# Patient Record
Sex: FEMALE | Race: WHITE | NOT HISPANIC OR LATINO | Employment: OTHER | URBAN - NONMETROPOLITAN AREA
[De-identification: names, ages, dates, MRNs, and addresses within clinical notes are randomized per-mention and may not be internally consistent; named-entity substitution may affect disease eponyms.]

---

## 2017-01-01 ENCOUNTER — LAB (OUTPATIENT)
Dept: LAB | Facility: HOSPITAL | Age: 82
End: 2017-01-01

## 2017-01-01 ENCOUNTER — OFFICE VISIT (OUTPATIENT)
Dept: FAMILY MEDICINE CLINIC | Facility: CLINIC | Age: 82
End: 2017-01-01

## 2017-01-01 ENCOUNTER — TELEPHONE (OUTPATIENT)
Dept: FAMILY MEDICINE CLINIC | Facility: CLINIC | Age: 82
End: 2017-01-01

## 2017-01-01 VITALS
WEIGHT: 106 LBS | DIASTOLIC BLOOD PRESSURE: 65 MMHG | HEIGHT: 60 IN | OXYGEN SATURATION: 97 % | SYSTOLIC BLOOD PRESSURE: 128 MMHG | BODY MASS INDEX: 20.81 KG/M2 | HEART RATE: 70 BPM

## 2017-01-01 VITALS
HEART RATE: 65 BPM | BODY MASS INDEX: 24.23 KG/M2 | WEIGHT: 123.4 LBS | OXYGEN SATURATION: 98 % | SYSTOLIC BLOOD PRESSURE: 125 MMHG | DIASTOLIC BLOOD PRESSURE: 60 MMHG | HEIGHT: 60 IN

## 2017-01-01 DIAGNOSIS — E03.9 ACQUIRED HYPOTHYROIDISM: Primary | ICD-10-CM

## 2017-01-01 DIAGNOSIS — Z23 NEED FOR VACCINATION: Primary | ICD-10-CM

## 2017-01-01 DIAGNOSIS — I10 ESSENTIAL HYPERTENSION: Primary | ICD-10-CM

## 2017-01-01 DIAGNOSIS — E03.9 ACQUIRED HYPOTHYROIDISM: ICD-10-CM

## 2017-01-01 DIAGNOSIS — I63.30 CEREBROVASCULAR ACCIDENT (CVA) DUE TO THROMBOSIS OF CEREBRAL ARTERY (HCC): Chronic | ICD-10-CM

## 2017-01-01 DIAGNOSIS — I10 ESSENTIAL HYPERTENSION: ICD-10-CM

## 2017-01-01 DIAGNOSIS — I63.30 CEREBROVASCULAR ACCIDENT (CVA) DUE TO THROMBOSIS OF CEREBRAL ARTERY (HCC): ICD-10-CM

## 2017-01-01 DIAGNOSIS — I25.10 CORONARY ARTERIOSCLEROSIS: Primary | ICD-10-CM

## 2017-01-01 DIAGNOSIS — I25.10 CORONARY ARTERIOSCLEROSIS: ICD-10-CM

## 2017-01-01 DIAGNOSIS — G47.09 OTHER INSOMNIA: ICD-10-CM

## 2017-01-01 DIAGNOSIS — N39.0 URINARY TRACT INFECTION, SITE UNSPECIFIED: Primary | ICD-10-CM

## 2017-01-01 DIAGNOSIS — E78.2 MIXED HYPERLIPIDEMIA: ICD-10-CM

## 2017-01-01 DIAGNOSIS — D64.9 ANEMIA, UNSPECIFIED TYPE: ICD-10-CM

## 2017-01-01 DIAGNOSIS — I10 ESSENTIAL HYPERTENSION: Chronic | ICD-10-CM

## 2017-01-01 DIAGNOSIS — E03.9 ACQUIRED HYPOTHYROIDISM: Chronic | ICD-10-CM

## 2017-01-01 DIAGNOSIS — F41.1 ANXIETY STATE: ICD-10-CM

## 2017-01-01 LAB
ALBUMIN SERPL-MCNC: 4.2 G/DL (ref 3.4–4.8)
ALBUMIN/GLOB SERPL: 1.9 G/DL (ref 1.1–1.8)
ALP SERPL-CCNC: 93 U/L (ref 38–126)
ALT SERPL W P-5'-P-CCNC: 23 U/L (ref 9–52)
ANION GAP SERPL CALCULATED.3IONS-SCNC: 12 MMOL/L (ref 5–15)
AST SERPL-CCNC: 22 U/L (ref 14–36)
BASOPHILS # BLD AUTO: 0.03 10*3/MM3 (ref 0–0.2)
BASOPHILS # BLD AUTO: 0.07 10*3/MM3 (ref 0–0.2)
BASOPHILS NFR BLD AUTO: 0.4 % (ref 0–2)
BASOPHILS NFR BLD AUTO: 1 % (ref 0–2)
BILIRUB SERPL-MCNC: 0.2 MG/DL (ref 0.2–1.3)
BUN BLD-MCNC: 18 MG/DL (ref 7–21)
BUN/CREAT SERPL: 16.8 (ref 7–25)
CALCIUM SPEC-SCNC: 9.5 MG/DL (ref 8.4–10.2)
CHLORIDE SERPL-SCNC: 101 MMOL/L (ref 95–110)
CO2 SERPL-SCNC: 23 MMOL/L (ref 22–31)
CREAT BLD-MCNC: 1.07 MG/DL (ref 0.5–1)
DEPRECATED RDW RBC AUTO: 45.2 FL (ref 36.4–46.3)
DEPRECATED RDW RBC AUTO: 52 FL (ref 36.4–46.3)
EOSINOPHIL # BLD AUTO: 0.15 10*3/MM3 (ref 0–0.7)
EOSINOPHIL # BLD AUTO: 0.23 10*3/MM3 (ref 0–0.7)
EOSINOPHIL NFR BLD AUTO: 1.9 % (ref 0–7)
EOSINOPHIL NFR BLD AUTO: 3.4 % (ref 0–7)
ERYTHROCYTE [DISTWIDTH] IN BLOOD BY AUTOMATED COUNT: 13.1 % (ref 11.5–14.5)
ERYTHROCYTE [DISTWIDTH] IN BLOOD BY AUTOMATED COUNT: 14.9 % (ref 11.5–14.5)
GFR SERPL CREATININE-BSD FRML MDRD: 48 ML/MIN/1.73 (ref 39–90)
GLOBULIN UR ELPH-MCNC: 2.2 GM/DL (ref 2.3–3.5)
GLUCOSE BLD-MCNC: 90 MG/DL (ref 60–100)
HCT VFR BLD AUTO: 33.7 % (ref 35–45)
HCT VFR BLD AUTO: 36.6 % (ref 35–45)
HGB BLD-MCNC: 11.4 G/DL (ref 12–15.5)
HGB BLD-MCNC: 11.8 G/DL (ref 12–15.5)
IMM GRANULOCYTES # BLD: 0.01 10*3/MM3 (ref 0–0.02)
IMM GRANULOCYTES # BLD: 0.01 10*3/MM3 (ref 0–0.02)
IMM GRANULOCYTES NFR BLD: 0.1 % (ref 0–0.5)
IMM GRANULOCYTES NFR BLD: 0.1 % (ref 0–0.5)
LYMPHOCYTES # BLD AUTO: 2.75 10*3/MM3 (ref 0.6–4.2)
LYMPHOCYTES # BLD AUTO: 3.08 10*3/MM3 (ref 0.6–4.2)
LYMPHOCYTES NFR BLD AUTO: 39.4 % (ref 10–50)
LYMPHOCYTES NFR BLD AUTO: 40.5 % (ref 10–50)
MCH RBC QN AUTO: 30.7 PG (ref 26.5–34)
MCH RBC QN AUTO: 31.7 PG (ref 26.5–34)
MCHC RBC AUTO-ENTMCNC: 32.2 G/DL (ref 31.4–36)
MCHC RBC AUTO-ENTMCNC: 33.8 G/DL (ref 31.4–36)
MCV RBC AUTO: 93.6 FL (ref 80–98)
MCV RBC AUTO: 95.3 FL (ref 80–98)
MONOCYTES # BLD AUTO: 0.46 10*3/MM3 (ref 0–0.9)
MONOCYTES # BLD AUTO: 0.47 10*3/MM3 (ref 0–0.9)
MONOCYTES NFR BLD AUTO: 5.9 % (ref 0–12)
MONOCYTES NFR BLD AUTO: 6.9 % (ref 0–12)
NEUTROPHILS # BLD AUTO: 3.26 10*3/MM3 (ref 2–8.6)
NEUTROPHILS # BLD AUTO: 4.09 10*3/MM3 (ref 2–8.6)
NEUTROPHILS NFR BLD AUTO: 48.1 % (ref 37–80)
NEUTROPHILS NFR BLD AUTO: 52.3 % (ref 37–80)
NRBC BLD MANUAL-RTO: 0 /100 WBC (ref 0–0)
PLATELET # BLD AUTO: 276 10*3/MM3 (ref 150–450)
PLATELET # BLD AUTO: 287 10*3/MM3 (ref 150–450)
PMV BLD AUTO: 10.6 FL (ref 8–12)
PMV BLD AUTO: 9.8 FL (ref 8–12)
POTASSIUM BLD-SCNC: 5.1 MMOL/L (ref 3.5–5.1)
PROT SERPL-MCNC: 6.4 G/DL (ref 6.3–8.6)
RBC # BLD AUTO: 3.6 10*6/MM3 (ref 3.77–5.16)
RBC # BLD AUTO: 3.84 10*6/MM3 (ref 3.77–5.16)
SODIUM BLD-SCNC: 136 MMOL/L (ref 137–145)
T4 FREE SERPL-MCNC: 1.26 NG/DL (ref 0.78–2.19)
T4 FREE SERPL-MCNC: 1.54 NG/DL (ref 0.78–2.19)
TSH SERPL DL<=0.05 MIU/L-ACNC: 0.66 MIU/ML (ref 0.46–4.68)
TSH SERPL DL<=0.05 MIU/L-ACNC: 3.08 MIU/ML (ref 0.46–4.68)
WBC NRBC COR # BLD: 6.79 10*3/MM3 (ref 3.2–9.8)
WBC NRBC COR # BLD: 7.82 10*3/MM3 (ref 3.2–9.8)

## 2017-01-01 PROCEDURE — 84439 ASSAY OF FREE THYROXINE: CPT | Performed by: GENERAL PRACTICE

## 2017-01-01 PROCEDURE — G0008 ADMIN INFLUENZA VIRUS VAC: HCPCS | Performed by: GENERAL PRACTICE

## 2017-01-01 PROCEDURE — 99214 OFFICE O/P EST MOD 30 MIN: CPT | Performed by: GENERAL PRACTICE

## 2017-01-01 PROCEDURE — 90662 IIV NO PRSV INCREASED AG IM: CPT | Performed by: GENERAL PRACTICE

## 2017-01-01 PROCEDURE — 84443 ASSAY THYROID STIM HORMONE: CPT | Performed by: GENERAL PRACTICE

## 2017-01-01 PROCEDURE — 85025 COMPLETE CBC W/AUTO DIFF WBC: CPT | Performed by: GENERAL PRACTICE

## 2017-01-01 PROCEDURE — 36415 COLL VENOUS BLD VENIPUNCTURE: CPT

## 2017-01-01 PROCEDURE — 80053 COMPREHEN METABOLIC PANEL: CPT | Performed by: GENERAL PRACTICE

## 2017-01-01 RX ORDER — FLUTICASONE PROPIONATE 50 MCG
SPRAY, SUSPENSION (ML) NASAL
Qty: 16 G | Refills: 3 | Status: SHIPPED | OUTPATIENT
Start: 2017-01-01 | End: 2017-01-01 | Stop reason: SDUPTHER

## 2017-01-01 RX ORDER — ERGOCALCIFEROL (VITAMIN D2) 10 MCG
1000 TABLET ORAL DAILY
COMMUNITY

## 2017-01-01 RX ORDER — LOSARTAN POTASSIUM 100 MG/1
TABLET ORAL
Qty: 30 TABLET | Refills: 6 | Status: SHIPPED | OUTPATIENT
Start: 2017-01-01

## 2017-01-01 RX ORDER — LOSARTAN POTASSIUM 100 MG/1
TABLET ORAL
Qty: 30 TABLET | Refills: 6 | Status: SHIPPED | OUTPATIENT
Start: 2017-01-01 | End: 2017-01-01 | Stop reason: SDUPTHER

## 2017-01-01 RX ORDER — PANTOPRAZOLE SODIUM 40 MG/1
TABLET, DELAYED RELEASE ORAL
Qty: 30 TABLET | Refills: 6 | Status: SHIPPED | OUTPATIENT
Start: 2017-01-01

## 2017-01-01 RX ORDER — LEVOTHYROXINE SODIUM 0.07 MG/1
TABLET ORAL
Qty: 30 TABLET | Refills: 5 | Status: SHIPPED | OUTPATIENT
Start: 2017-01-01

## 2017-01-01 RX ORDER — CLOPIDOGREL BISULFATE 75 MG/1
75 TABLET ORAL DAILY
Qty: 90 TABLET | Refills: 3 | Status: SHIPPED | OUTPATIENT
Start: 2017-01-01

## 2017-01-01 RX ORDER — ALPRAZOLAM 0.25 MG/1
0.25 TABLET ORAL 3 TIMES DAILY PRN
Qty: 90 TABLET | Refills: 3 | Status: SHIPPED | OUTPATIENT
Start: 2017-01-01 | End: 2017-01-01 | Stop reason: SDUPTHER

## 2017-01-01 RX ORDER — ALPRAZOLAM 0.25 MG/1
0.25 TABLET ORAL 3 TIMES DAILY PRN
Qty: 90 TABLET | Refills: 3 | Status: SHIPPED | OUTPATIENT
Start: 2017-01-01

## 2017-01-01 RX ORDER — HYDRALAZINE HYDROCHLORIDE 25 MG/1
TABLET, FILM COATED ORAL
Qty: 90 TABLET | Refills: 3 | Status: SHIPPED | OUTPATIENT
Start: 2017-01-01 | End: 2017-01-01 | Stop reason: SDUPTHER

## 2017-01-01 RX ORDER — LEVOTHYROXINE SODIUM 0.07 MG/1
TABLET ORAL
Qty: 30 TABLET | Refills: 5 | Status: SHIPPED | OUTPATIENT
Start: 2017-01-01 | End: 2017-01-01 | Stop reason: SDUPTHER

## 2017-01-01 RX ORDER — HYDRALAZINE HYDROCHLORIDE 25 MG/1
TABLET, FILM COATED ORAL
Qty: 90 TABLET | Refills: 3 | Status: SHIPPED | OUTPATIENT
Start: 2017-01-01

## 2017-01-01 RX ORDER — SIMETHICONE 80 MG
80 TABLET,CHEWABLE ORAL NIGHTLY
COMMUNITY

## 2017-01-01 RX ORDER — FLUTICASONE PROPIONATE 50 MCG
SPRAY, SUSPENSION (ML) NASAL
Qty: 16 G | Refills: 3 | Status: SHIPPED | OUTPATIENT
Start: 2017-01-01

## 2017-01-13 RX ORDER — HYDRALAZINE HYDROCHLORIDE 25 MG/1
TABLET, FILM COATED ORAL
Qty: 90 TABLET | Refills: 3 | Status: SHIPPED | OUTPATIENT
Start: 2017-01-13 | End: 2017-01-01 | Stop reason: SDUPTHER

## 2017-01-31 ENCOUNTER — LAB (OUTPATIENT)
Dept: LAB | Facility: HOSPITAL | Age: 82
End: 2017-01-31

## 2017-01-31 DIAGNOSIS — N39.0 UTI (URINARY TRACT INFECTION), UNCOMPLICATED: Primary | ICD-10-CM

## 2017-01-31 DIAGNOSIS — N39.0 UTI (URINARY TRACT INFECTION), UNCOMPLICATED: ICD-10-CM

## 2017-01-31 LAB
BACTERIA UR QL AUTO: ABNORMAL /HPF
BILIRUB UR QL STRIP: NEGATIVE
CLARITY UR: ABNORMAL
COLOR UR: YELLOW
GLUCOSE UR STRIP-MCNC: NEGATIVE MG/DL
HGB UR QL STRIP.AUTO: NEGATIVE
HYALINE CASTS UR QL AUTO: ABNORMAL /LPF
KETONES UR QL STRIP: NEGATIVE
LEUKOCYTE ESTERASE UR QL STRIP.AUTO: ABNORMAL
NITRITE UR QL STRIP: NEGATIVE
PH UR STRIP.AUTO: 5.5 [PH] (ref 5–9)
PROT UR QL STRIP: NEGATIVE
RBC # UR: ABNORMAL /HPF
REF LAB TEST METHOD: ABNORMAL
SP GR UR STRIP: 1.02 (ref 1–1.03)
SQUAMOUS #/AREA URNS HPF: ABNORMAL /HPF
UROBILINOGEN UR QL STRIP: ABNORMAL
WBC UR QL AUTO: ABNORMAL /HPF

## 2017-01-31 PROCEDURE — 87186 SC STD MICRODIL/AGAR DIL: CPT | Performed by: GENERAL PRACTICE

## 2017-01-31 PROCEDURE — 87077 CULTURE AEROBIC IDENTIFY: CPT | Performed by: GENERAL PRACTICE

## 2017-01-31 PROCEDURE — 87086 URINE CULTURE/COLONY COUNT: CPT | Performed by: GENERAL PRACTICE

## 2017-01-31 PROCEDURE — 81001 URINALYSIS AUTO W/SCOPE: CPT | Performed by: GENERAL PRACTICE

## 2017-02-02 LAB
BACTERIA SPEC AEROBE CULT: ABNORMAL
BETA LACTAMASE: ABNORMAL

## 2017-02-02 RX ORDER — SULFAMETHOXAZOLE AND TRIMETHOPRIM 800; 160 MG/1; MG/1
1 TABLET ORAL 2 TIMES DAILY
Qty: 14 TABLET | Refills: 0 | Status: SHIPPED | OUTPATIENT
Start: 2017-02-02 | End: 2017-01-01

## 2017-03-21 NOTE — PROGRESS NOTES
Subjective   Isabel Barry is a 89 y.o. female.     Chief Complaint   Patient presents with   • Follow-up   • Hypertension     For review and evaluation of management of chronic medical problems. No complaints. Tolerating medications. Well looked after at home by family. Anxiety is stable. Labs pending. Not sleeping well, have tried otc meds.   Hypertension   This is a chronic problem. The current episode started more than 1 year ago. The problem is unchanged. The problem is controlled. Pertinent negatives include no chest pain, headaches, neck pain, palpitations or shortness of breath. There are no associated agents to hypertension. Past treatments include direct vasodilators. The current treatment provides significant improvement. There are no compliance problems.    Hypothyroidism   This is a chronic problem. The current episode started more than 1 year ago. The problem occurs constantly. The problem has been unchanged. Associated symptoms include weakness (right side). Pertinent negatives include no abdominal pain, arthralgias, chest pain, chills, congestion, coughing, fatigue, fever, headaches, joint swelling, myalgias, nausea, neck pain, numbness, rash, sore throat or vomiting. Nothing aggravates the symptoms. Treatments tried: levothyroxine. The treatment provided significant relief.      The following portions of the patient's history were reviewed and updated as appropriate: allergies, current medications, past social history and problem list.    Current Outpatient Prescriptions:   •  acetaminophen (TYLENOL) 500 MG tablet, Take 500 mg by mouth Every Night., Disp: , Rfl:   •  ALPRAZolam (XANAX) 0.25 MG tablet, Take 1 tablet by mouth 3 (Three) Times a Day As Needed for Anxiety., Disp: 90 tablet, Rfl: 3  •  clopidogrel (PLAVIX) 75 MG tablet, Take 1 tablet by mouth Daily., Disp: 90 tablet, Rfl: 3  •  fluticasone (FLONASE) 50 MCG/ACT nasal spray, USE 2 SPRAYS INTO EACH NOSTRIL EVERY DAY, Disp: 16 g, Rfl: 3  •   hydrALAZINE (APRESOLINE) 25 MG tablet, TAKE 1 TABLET(S) BY MOUTH 3 TIMES PER DAY, Disp: 90 tablet, Rfl: 3  •  levothyroxine (SYNTHROID, LEVOTHROID) 75 MCG tablet, TAKE 1 TABLET BY MOUTH DAILY. (DOSE DECREASED 10/27/16), Disp: 30 tablet, Rfl: 5  •  losartan (COZAAR) 100 MG tablet, TAKE 1 TABLET(S) BY MOUTH DAILY,FOR BLOOD PRESSURE, Disp: 30 tablet, Rfl: 6  •  mupirocin (BACTROBAN) 2 % ointment, Apply  topically 3 (Three) Times a Day., Disp: 30 g, Rfl: 1  •  pantoprazole (PROTONIX) 40 MG EC tablet, TAKE 1 TABLET BY MOUTH ONCE DAILY AS NEEDED, Disp: 30 tablet, Rfl: 6  •  simethicone (MYLICON) 80 MG chewable tablet, Chew 80 mg Every 6 (Six) Hours As Needed for flatulence., Disp: , Rfl:   •  vitamin B-12 (CYANOCOBALAMIN) 1000 MCG tablet, Take 1,000 mcg by mouth Daily., Disp: , Rfl:   •  Vitamin D, Cholecalciferol, (CHOLECALCIFEROL) 400 UNITS tablet, Take 400 Units by mouth Daily., Disp: , Rfl:   •  potassium chloride ER (K-TAB) 20 MEQ tablet controlled-release ER tablet, Take 20 mEq by mouth Daily., Disp: , Rfl:     Review of Systems   Constitutional: Negative for activity change, appetite change, chills, fatigue, fever and unexpected weight change.   HENT: Negative.  Negative for congestion, ear pain, hearing loss, nosebleeds, rhinorrhea, sinus pressure, sneezing, sore throat, tinnitus and trouble swallowing.    Eyes: Negative.  Negative for pain, discharge, redness, itching and visual disturbance.   Respiratory: Negative.  Negative for apnea, cough, chest tightness, shortness of breath and wheezing.    Cardiovascular: Negative.  Negative for chest pain, palpitations and leg swelling.   Gastrointestinal: Negative.  Negative for abdominal distention, abdominal pain, constipation, diarrhea, nausea and vomiting.   Endocrine: Negative.    Genitourinary: Negative.  Negative for dysuria, frequency and urgency.   Musculoskeletal: Negative.  Negative for arthralgias, back pain, gait problem, joint swelling, myalgias, neck pain  "and neck stiffness.   Skin: Negative.  Negative for color change and rash.   Allergic/Immunologic: Negative.    Neurological: Positive for weakness (right side). Negative for dizziness, light-headedness, numbness and headaches.   Hematological: Negative.  Negative for adenopathy.   Psychiatric/Behavioral: Negative.  Negative for dysphoric mood and sleep disturbance. The patient is not nervous/anxious.      Objective     Visit Vitals   • /60 (BP Location: Left arm, Patient Position: Sitting, Cuff Size: Adult)   • Pulse 65   • Ht 60\" (152.4 cm)   • Wt 123 lb 6.4 oz (56 kg)   • SpO2 98%   • BMI 24.1 kg/m2       Physical Exam   Constitutional: She is oriented to person, place, and time. She appears well-developed and well-nourished. No distress.   HENT:   Head: Normocephalic and atraumatic.   Nose: Nose normal.   Mouth/Throat: Oropharynx is clear and moist.   Eyes: Conjunctivae and EOM are normal. Pupils are equal, round, and reactive to light. Right eye exhibits no discharge. Left eye exhibits no discharge.   Neck: No thyromegaly present.   Cardiovascular: Normal rate, regular rhythm, normal heart sounds and intact distal pulses.    Pulmonary/Chest: Effort normal and breath sounds normal.   Lymphadenopathy:     She has no cervical adenopathy.   Neurological: She is alert and oriented to person, place, and time. A cranial nerve deficit (slurred speech) is present. She exhibits abnormal muscle tone.   RUE 2/5    RLE 4/5   Skin: Skin is warm and dry.   Psychiatric: She has a normal mood and affect.   Nursing note and vitals reviewed.    Assessment/Plan     Problem List Items Addressed This Visit        Cardiovascular and Mediastinum    Cerebrovascular accident    Essential hypertension - Primary       Endocrine    Acquired hypothyroidism      Other Visit Diagnoses     Other insomnia            Will notify regarding results. Ned reviewed and appropriate. Try melatonin for sleep.    New Medications Ordered This " Visit   Medications   • Vitamin D, Cholecalciferol, (CHOLECALCIFEROL) 400 UNITS tablet     Sig: Take 400 Units by mouth Daily.   • simethicone (MYLICON) 80 MG chewable tablet     Sig: Chew 80 mg Every 6 (Six) Hours As Needed for flatulence.   • clopidogrel (PLAVIX) 75 MG tablet     Sig: Take 1 tablet by mouth Daily.     Dispense:  90 tablet     Refill:  3   • ALPRAZolam (XANAX) 0.25 MG tablet     Sig: Take 1 tablet by mouth 3 (Three) Times a Day As Needed for Anxiety.     Dispense:  90 tablet     Refill:  3

## 2017-06-07 NOTE — TELEPHONE ENCOUNTER
STACIA PATTERSON HAS CALLED FOR MOTHER-PHILIPPE ALFREDO....SHE THINKS MOTHER IS GETTING ANOTHER UTI AND IS WANTING TO GET A LAB ORDER PUT IN AND SHE WANTS TO COME BY OFFICE AND  CONTAINERS FOR THE LAB

## 2017-09-19 NOTE — PROGRESS NOTES
Subjective   Isabel Barry is a 89 y.o. female.     Chief Complaint   Patient presents with   • Follow-up   • Hypertension   • Hypothyroidism   • Hyperlipidemia   • Anxiety   • Foot Pain   • Knee Pain   • Hip Pain     For review and evaluation of management of chronic medical problems. No complaints. Tolerating medications. Well looked after at home by family. Anxiety is stable. Labs pending. Not sleeping well, have tried otc meds.   Hypertension   This is a chronic problem. The current episode started more than 1 year ago. The problem is unchanged. The problem is controlled. There are no associated agents to hypertension. Past treatments include direct vasodilators. The current treatment provides significant improvement. There are no compliance problems.    Hypothyroidism   This is a chronic problem. The current episode started more than 1 year ago. The problem occurs constantly. The problem has been unchanged. Nothing aggravates the symptoms. Treatments tried: levothyroxine. The treatment provided significant relief.      The following portions of the patient's history were reviewed and updated as appropriate: allergies, current medications, past social history and problem list.    Current Outpatient Prescriptions:   •  acetaminophen (TYLENOL) 500 MG tablet, Take 500 mg by mouth Every Night., Disp: , Rfl:   •  ALPRAZolam (XANAX) 0.25 MG tablet, Take 1 tablet by mouth 3 (Three) Times a Day As Needed for Anxiety., Disp: 90 tablet, Rfl: 3  •  clopidogrel (PLAVIX) 75 MG tablet, Take 1 tablet by mouth Daily., Disp: 90 tablet, Rfl: 3  •  fluticasone (FLONASE) 50 MCG/ACT nasal spray, USE 2 SPRAYS INTO EACH NOSTRIL EVERY DAY, Disp: 16 g, Rfl: 3  •  levothyroxine (SYNTHROID, LEVOTHROID) 75 MCG tablet, TAKE 1 TABLET BY MOUTH DAILY. (DOSE DECREASED 10/27/16), Disp: 30 tablet, Rfl: 5  •  losartan (COZAAR) 100 MG tablet, TAKE 1 TABLET(S) BY MOUTH DAILY,FOR BLOOD PRESSURE, Disp: 30 tablet, Rfl: 6  •  mupirocin (BACTROBAN) 2 %  "ointment, Apply  topically 3 (Three) Times a Day., Disp: 30 g, Rfl: 1  •  pantoprazole (PROTONIX) 40 MG EC tablet, TAKE 1 TABLET BY MOUTH ONCE DAILY AS NEEDED, Disp: 30 tablet, Rfl: 6  •  potassium chloride ER (K-TAB) 20 MEQ tablet controlled-release ER tablet, Take 20 mEq by mouth Daily., Disp: , Rfl:   •  simethicone (MYLICON) 80 MG chewable tablet, Chew 80 mg Every 6 (Six) Hours As Needed for flatulence., Disp: , Rfl:   •  vitamin B-12 (CYANOCOBALAMIN) 1000 MCG tablet, Take 1,000 mcg by mouth Daily., Disp: , Rfl:   •  Vitamin D, Cholecalciferol, (CHOLECALCIFEROL) 400 UNITS tablet, Take 400 Units by mouth Daily., Disp: , Rfl:   •  hydrALAZINE (APRESOLINE) 25 MG tablet, TAKE 1 TABLET(S) BY MOUTH 3 TIMES PER DAY, Disp: 90 tablet, Rfl: 3    Review of Systems   Constitutional: Negative for activity change, appetite change and unexpected weight change.   HENT: Negative.  Negative for ear pain, hearing loss, nosebleeds, rhinorrhea, sinus pressure, sneezing, tinnitus and trouble swallowing.    Eyes: Negative.  Negative for pain, discharge, redness, itching and visual disturbance.   Respiratory: Negative.  Negative for apnea, chest tightness and wheezing.    Cardiovascular: Negative.  Negative for leg swelling.   Gastrointestinal: Negative.  Negative for abdominal distention, constipation and diarrhea.   Endocrine: Negative.    Genitourinary: Negative.  Negative for dysuria, frequency and urgency.   Musculoskeletal: Negative.  Negative for back pain, gait problem and neck stiffness.   Skin: Negative.  Negative for color change.   Allergic/Immunologic: Negative.    Neurological: Negative for light-headedness.   Hematological: Negative.  Negative for adenopathy.   Psychiatric/Behavioral: Negative.  Negative for dysphoric mood and sleep disturbance.     Objective     Visit Vitals   • /65 (BP Location: Left arm, Patient Position: Sitting, Cuff Size: Adult)   • Pulse 70   • Ht 60\" (152.4 cm)   • Wt 106 lb (48.1 kg)   • " SpO2 97%   • BMI 20.7 kg/m2        Physical Exam   Constitutional: She is oriented to person, place, and time. She appears well-developed and well-nourished. No distress.   HENT:   Head: Normocephalic and atraumatic.   Nose: Nose normal.   Mouth/Throat: Oropharynx is clear and moist.   Eyes: Conjunctivae and EOM are normal. Pupils are equal, round, and reactive to light. Right eye exhibits no discharge. Left eye exhibits no discharge.   Neck: No thyromegaly present.   Cardiovascular: Normal rate, regular rhythm, normal heart sounds and intact distal pulses.    Pulmonary/Chest: Effort normal and breath sounds normal.   Lymphadenopathy:     She has no cervical adenopathy.   Neurological: She is alert and oriented to person, place, and time. A cranial nerve deficit (slurred speech) is present. She exhibits abnormal muscle tone.   RUE 2/5    RLE 4/5   Skin: Skin is warm and dry.   Psychiatric: She has a normal mood and affect.   Nursing note and vitals reviewed.    Assessment/Plan     Problem List Items Addressed This Visit        Cardiovascular and Mediastinum    Cerebrovascular accident (Chronic)    Relevant Orders    Comprehensive Metabolic Panel    CBC & Differential    Essential hypertension (Chronic)    Relevant Orders    Comprehensive Metabolic Panel    Urinalysis With / Culture If Indicated - Urine, Clean Catch    LDL Cholesterol, Direct       Endocrine    Acquired hypothyroidism (Chronic)    Relevant Orders    TSH    T4, Free       Other    Anxiety state      Other Visit Diagnoses     Need for vaccination    -  Primary    Relevant Orders    Flu Vaccine High Dose PF 65YR+ (FLUZONE [id number]) (Completed)        Will notify regarding results. Ned reviewed and appropriate. Try melatonin for sleep. Ned reviewed and appropriate.     New Medications Ordered This Visit   Medications   • mupirocin (BACTROBAN) 2 % ointment     Sig: Apply  topically 3 (Three) Times a Day.     Dispense:  30 g     Refill:  1   •  ALPRAZolam (XANAX) 0.25 MG tablet     Sig: Take 1 tablet by mouth 3 (Three) Times a Day As Needed for Anxiety.     Dispense:  90 tablet     Refill:  3

## 2018-01-01 ENCOUNTER — APPOINTMENT (OUTPATIENT)
Dept: GENERAL RADIOLOGY | Facility: HOSPITAL | Age: 83
End: 2018-01-01

## 2018-01-01 ENCOUNTER — ANESTHESIA (OUTPATIENT)
Dept: PERIOP | Facility: HOSPITAL | Age: 83
End: 2018-01-01

## 2018-01-01 ENCOUNTER — EPISODE CHANGES (OUTPATIENT)
Dept: CASE MANAGEMENT | Facility: OTHER | Age: 83
End: 2018-01-01

## 2018-01-01 ENCOUNTER — APPOINTMENT (OUTPATIENT)
Dept: CARDIOLOGY | Facility: HOSPITAL | Age: 83
End: 2018-01-01

## 2018-01-01 ENCOUNTER — APPOINTMENT (OUTPATIENT)
Dept: INTERVENTIONAL RADIOLOGY/VASCULAR | Facility: HOSPITAL | Age: 83
End: 2018-01-01

## 2018-01-01 ENCOUNTER — HOSPITAL ENCOUNTER (INPATIENT)
Facility: HOSPITAL | Age: 83
LOS: 8 days | End: 2018-02-16
Attending: EMERGENCY MEDICINE | Admitting: HOSPITALIST

## 2018-01-01 ENCOUNTER — APPOINTMENT (OUTPATIENT)
Dept: ULTRASOUND IMAGING | Facility: HOSPITAL | Age: 83
End: 2018-01-01

## 2018-01-01 ENCOUNTER — ANESTHESIA EVENT (OUTPATIENT)
Dept: PERIOP | Facility: HOSPITAL | Age: 83
End: 2018-01-01

## 2018-01-01 VITALS
DIASTOLIC BLOOD PRESSURE: 30 MMHG | BODY MASS INDEX: 28.54 KG/M2 | HEART RATE: 114 BPM | TEMPERATURE: 98.2 F | SYSTOLIC BLOOD PRESSURE: 76 MMHG | WEIGHT: 161.1 LBS | OXYGEN SATURATION: 78 % | RESPIRATION RATE: 17 BRPM | HEIGHT: 63 IN

## 2018-01-01 DIAGNOSIS — S72.011A CLOSED SUBCAPITAL FRACTURE OF RIGHT FEMUR, INITIAL ENCOUNTER (HCC): Primary | ICD-10-CM

## 2018-01-01 DIAGNOSIS — Z78.9 IMPAIRED MOBILITY AND ACTIVITIES OF DAILY LIVING: ICD-10-CM

## 2018-01-01 DIAGNOSIS — R13.12 OROPHARYNGEAL DYSPHAGIA: ICD-10-CM

## 2018-01-01 DIAGNOSIS — Z74.09 IMPAIRED PHYSICAL MOBILITY: ICD-10-CM

## 2018-01-01 DIAGNOSIS — Z74.09 IMPAIRED MOBILITY AND ACTIVITIES OF DAILY LIVING: ICD-10-CM

## 2018-01-01 DIAGNOSIS — S72.001A HIP FRACTURE, RIGHT, CLOSED, INITIAL ENCOUNTER (HCC): ICD-10-CM

## 2018-01-01 LAB
ALBUMIN SERPL-MCNC: 2.2 G/DL (ref 3.4–4.8)
ALBUMIN SERPL-MCNC: 2.8 G/DL (ref 3.4–4.8)
ALBUMIN SERPL-MCNC: 3.4 G/DL (ref 3.4–4.8)
ALBUMIN/GLOB SERPL: 1 G/DL (ref 1.1–1.8)
ALBUMIN/GLOB SERPL: 1.1 G/DL (ref 1.1–1.8)
ALBUMIN/GLOB SERPL: 1.3 G/DL (ref 1.1–1.8)
ALP SERPL-CCNC: 100 U/L (ref 38–126)
ALP SERPL-CCNC: 76 U/L (ref 38–126)
ALP SERPL-CCNC: 95 U/L (ref 38–126)
ALT SERPL W P-5'-P-CCNC: 24 U/L (ref 9–52)
ALT SERPL W P-5'-P-CCNC: 28 U/L (ref 9–52)
ALT SERPL W P-5'-P-CCNC: 36 U/L (ref 9–52)
ANION GAP SERPL CALCULATED.3IONS-SCNC: 13 MMOL/L (ref 5–15)
ANION GAP SERPL CALCULATED.3IONS-SCNC: 5 MMOL/L (ref 5–15)
ANION GAP SERPL CALCULATED.3IONS-SCNC: 7 MMOL/L (ref 5–15)
ANION GAP SERPL CALCULATED.3IONS-SCNC: 7 MMOL/L (ref 5–15)
ANION GAP SERPL CALCULATED.3IONS-SCNC: 8 MMOL/L (ref 5–15)
APTT PPP: 26.1 SECONDS (ref 20–40.3)
ARTERIAL PATENCY WRIST A: ABNORMAL
AST SERPL-CCNC: 23 U/L (ref 14–36)
AST SERPL-CCNC: 27 U/L (ref 14–36)
AST SERPL-CCNC: 35 U/L (ref 14–36)
ATMOSPHERIC PRESS: ABNORMAL MMHG
BACTERIA SPEC AEROBE CULT: ABNORMAL
BACTERIA UR QL AUTO: ABNORMAL /HPF
BASE EXCESS BLDA CALC-SCNC: -6.8 MMOL/L (ref -2.4–2.4)
BASOPHILS # BLD AUTO: 0.01 10*3/MM3 (ref 0–0.2)
BASOPHILS # BLD AUTO: 0.03 10*3/MM3 (ref 0–0.2)
BASOPHILS # BLD AUTO: 0.04 10*3/MM3 (ref 0–0.2)
BASOPHILS # BLD AUTO: 0.04 10*3/MM3 (ref 0–0.2)
BASOPHILS NFR BLD AUTO: 0.1 % (ref 0–2)
BASOPHILS NFR BLD AUTO: 0.2 % (ref 0–2)
BASOPHILS NFR BLD AUTO: 0.5 % (ref 0–2)
BASOPHILS NFR BLD AUTO: 0.5 % (ref 0–2)
BDY SITE: ABNORMAL
BH CV ECHO MEAS - ACS: 0.94 CM
BH CV ECHO MEAS - AI DEC SLOPE: 436.9 CM/SEC^2
BH CV ECHO MEAS - AI MAX PG: 45 MMHG
BH CV ECHO MEAS - AI MAX VEL: 335.3 CM/SEC
BH CV ECHO MEAS - AI P1/2T: 224.8 MSEC
BH CV ECHO MEAS - AO ISTHMUS: 2.7 CM
BH CV ECHO MEAS - AO MAX PG (FULL): 0.91 MMHG
BH CV ECHO MEAS - AO MAX PG: 4.5 MMHG
BH CV ECHO MEAS - AO MEAN PG (FULL): -0.09 MMHG
BH CV ECHO MEAS - AO MEAN PG: 1.6 MMHG
BH CV ECHO MEAS - AO ROOT AREA (BSA CORRECTED): 1.7
BH CV ECHO MEAS - AO ROOT AREA: 6.8 CM^2
BH CV ECHO MEAS - AO ROOT DIAM: 2.9 CM
BH CV ECHO MEAS - AO V2 MAX: 105.9 CM/SEC
BH CV ECHO MEAS - AO V2 MEAN: 54.1 CM/SEC
BH CV ECHO MEAS - AO V2 VTI: 9.7 CM
BH CV ECHO MEAS - ASC AORTA: 3.2 CM
BH CV ECHO MEAS - AVA(I,A): 4 CM^2
BH CV ECHO MEAS - AVA(I,D): 4 CM^2
BH CV ECHO MEAS - AVA(V,A): 3.2 CM^2
BH CV ECHO MEAS - AVA(V,D): 3.2 CM^2
BH CV ECHO MEAS - BSA(HAYCOCK): 1.8 M^2
BH CV ECHO MEAS - BSA: 1.8 M^2
BH CV ECHO MEAS - BZI_BMI: 28.5 KILOGRAMS/M^2
BH CV ECHO MEAS - BZI_METRIC_HEIGHT: 160 CM
BH CV ECHO MEAS - BZI_METRIC_WEIGHT: 73 KG
BH CV ECHO MEAS - CONTRAST EF (2CH): 19.7 ML/M^2
BH CV ECHO MEAS - CONTRAST EF 4CH: 20.3 ML/M^2
BH CV ECHO MEAS - EDV(CUBED): 198.2 ML
BH CV ECHO MEAS - EDV(MOD-SP2): 92.7 ML
BH CV ECHO MEAS - EDV(MOD-SP4): 90.6 ML
BH CV ECHO MEAS - EDV(TEICH): 168.6 ML
BH CV ECHO MEAS - EF(CUBED): 14.3 %
BH CV ECHO MEAS - EF(MOD-SP2): 19.7 %
BH CV ECHO MEAS - EF(TEICH): 11.2 %
BH CV ECHO MEAS - ESV(CUBED): 169.8 ML
BH CV ECHO MEAS - ESV(MOD-SP2): 74.4 ML
BH CV ECHO MEAS - ESV(MOD-SP4): 72.2 ML
BH CV ECHO MEAS - ESV(TEICH): 149.7 ML
BH CV ECHO MEAS - FS: 5 %
BH CV ECHO MEAS - IVS/LVPW: 1
BH CV ECHO MEAS - IVSD: 0.75 CM
BH CV ECHO MEAS - LA DIMENSION: 4 CM
BH CV ECHO MEAS - LA/AO: 1.3
BH CV ECHO MEAS - LV DIASTOLIC VOL/BSA (35-75): 51.4 ML/M^2
BH CV ECHO MEAS - LV MASS(C)D: 162.7 GRAMS
BH CV ECHO MEAS - LV MASS(C)DI: 92.3 GRAMS/M^2
BH CV ECHO MEAS - LV MAX PG: 3.6 MMHG
BH CV ECHO MEAS - LV MEAN PG: 1.7 MMHG
BH CV ECHO MEAS - LV SYSTOLIC VOL/BSA (12-30): 40.9 ML/M^2
BH CV ECHO MEAS - LV V1 MAX: 94.6 CM/SEC
BH CV ECHO MEAS - LV V1 MEAN: 60.3 CM/SEC
BH CV ECHO MEAS - LV V1 VTI: 10.8 CM
BH CV ECHO MEAS - LVIDD: 5.8 CM
BH CV ECHO MEAS - LVIDS: 5.5 CM
BH CV ECHO MEAS - LVLD AP2: 7 CM
BH CV ECHO MEAS - LVLD AP4: 6.9 CM
BH CV ECHO MEAS - LVLS AP2: 6.8 CM
BH CV ECHO MEAS - LVLS AP4: 6 CM
BH CV ECHO MEAS - LVOT AREA (M): 3.5 CM^2
BH CV ECHO MEAS - LVOT AREA: 3.6 CM^2
BH CV ECHO MEAS - LVOT DIAM: 2.1 CM
BH CV ECHO MEAS - LVPWD: 0.74 CM
BH CV ECHO MEAS - MR MAX PG: 83.2 MMHG
BH CV ECHO MEAS - MR MAX VEL: 456.1 CM/SEC
BH CV ECHO MEAS - MV A MAX VEL: 59.2 CM/SEC
BH CV ECHO MEAS - MV DEC SLOPE: 771 CM/SEC^2
BH CV ECHO MEAS - MV E MAX VEL: 90.2 CM/SEC
BH CV ECHO MEAS - MV E/A: 1.5
BH CV ECHO MEAS - MV MAX PG: 4.9 MMHG
BH CV ECHO MEAS - MV MEAN PG: 3 MMHG
BH CV ECHO MEAS - MV P1/2T MAX VEL: 92.1 CM/SEC
BH CV ECHO MEAS - MV P1/2T: 35 MSEC
BH CV ECHO MEAS - MV V2 MAX: 108.6 CM/SEC
BH CV ECHO MEAS - MV V2 MEAN: 75.6 CM/SEC
BH CV ECHO MEAS - MV V2 VTI: 16 CM
BH CV ECHO MEAS - MVA P1/2T LCG: 2.4 CM^2
BH CV ECHO MEAS - MVA(P1/2T): 6.3 CM^2
BH CV ECHO MEAS - MVA(VTI): 2.5 CM^2
BH CV ECHO MEAS - PA MAX PG: 3.6 MMHG
BH CV ECHO MEAS - PA V2 MAX: 95 CM/SEC
BH CV ECHO MEAS - PI END-D VEL: 222.6 CM/SEC
BH CV ECHO MEAS - RVDD: 3.1 CM
BH CV ECHO MEAS - RVSP: 45 MMHG
BH CV ECHO MEAS - SI(AO): 37.2 ML/M^2
BH CV ECHO MEAS - SI(CUBED): 16.1 ML/M^2
BH CV ECHO MEAS - SI(LVOT): 22.2 ML/M^2
BH CV ECHO MEAS - SI(MOD-SP2): 10.4 ML/M^2
BH CV ECHO MEAS - SI(MOD-SP4): 10.4 ML/M^2
BH CV ECHO MEAS - SI(TEICH): 10.7 ML/M^2
BH CV ECHO MEAS - SV(AO): 65.6 ML
BH CV ECHO MEAS - SV(CUBED): 28.4 ML
BH CV ECHO MEAS - SV(LVOT): 39.2 ML
BH CV ECHO MEAS - SV(MOD-SP2): 18.3 ML
BH CV ECHO MEAS - SV(MOD-SP4): 18.4 ML
BH CV ECHO MEAS - SV(TEICH): 18.8 ML
BH CV ECHO MEAS - TAPSE (>1.6): 1.5 CM2
BH CV ECHO MEAS - TR MAX VEL: 300 CM/SEC
BILIRUB SERPL-MCNC: 0.2 MG/DL (ref 0.2–1.3)
BILIRUB SERPL-MCNC: 0.2 MG/DL (ref 0.2–1.3)
BILIRUB SERPL-MCNC: 0.3 MG/DL (ref 0.2–1.3)
BILIRUB UR QL STRIP: NEGATIVE
BUN BLD-MCNC: 12 MG/DL (ref 7–21)
BUN BLD-MCNC: 13 MG/DL (ref 7–21)
BUN BLD-MCNC: 14 MG/DL (ref 7–21)
BUN/CREAT SERPL: 15.1 (ref 7–25)
BUN/CREAT SERPL: 15.8 (ref 7–25)
BUN/CREAT SERPL: 16 (ref 7–25)
BUN/CREAT SERPL: 16.7 (ref 7–25)
BUN/CREAT SERPL: 20.3 (ref 7–25)
CA-I BLD-MCNC: 4.7 MG/DL (ref 4.5–4.9)
CALCIUM SPEC-SCNC: 8.1 MG/DL (ref 8.4–10.2)
CALCIUM SPEC-SCNC: 8.3 MG/DL (ref 8.4–10.2)
CALCIUM SPEC-SCNC: 8.5 MG/DL (ref 8.4–10.2)
CALCIUM SPEC-SCNC: 8.7 MG/DL (ref 8.4–10.2)
CALCIUM SPEC-SCNC: 9.1 MG/DL (ref 8.4–10.2)
CHLORIDE SERPL-SCNC: 103 MMOL/L (ref 95–110)
CHLORIDE SERPL-SCNC: 106 MMOL/L (ref 95–110)
CHLORIDE SERPL-SCNC: 109 MMOL/L (ref 95–110)
CHLORIDE SERPL-SCNC: 109 MMOL/L (ref 95–110)
CHLORIDE SERPL-SCNC: 110 MMOL/L (ref 95–110)
CK MB SERPL-CCNC: 5.27 NG/ML (ref 0–5)
CK SERPL-CCNC: 84 U/L (ref 30–135)
CLARITY UR: CLEAR
CO2 BLDA-SCNC: 18.6 MMOL/L (ref 23–27)
CO2 SERPL-SCNC: 21 MMOL/L (ref 22–31)
CO2 SERPL-SCNC: 23 MMOL/L (ref 22–31)
CO2 SERPL-SCNC: 23 MMOL/L (ref 22–31)
CO2 SERPL-SCNC: 24 MMOL/L (ref 22–31)
CO2 SERPL-SCNC: 27 MMOL/L (ref 22–31)
COLOR UR: YELLOW
CREAT BLD-MCNC: 0.64 MG/DL (ref 0.5–1)
CREAT BLD-MCNC: 0.76 MG/DL (ref 0.5–1)
CREAT BLD-MCNC: 0.81 MG/DL (ref 0.5–1)
CREAT BLD-MCNC: 0.84 MG/DL (ref 0.5–1)
CREAT BLD-MCNC: 0.86 MG/DL (ref 0.5–1)
DEPRECATED RDW RBC AUTO: 44.2 FL (ref 36.4–46.3)
DEPRECATED RDW RBC AUTO: 44.5 FL (ref 36.4–46.3)
DEPRECATED RDW RBC AUTO: 45 FL (ref 36.4–46.3)
DEPRECATED RDW RBC AUTO: 48.4 FL (ref 36.4–46.3)
DEPRECATED RDW RBC AUTO: 49.4 FL (ref 36.4–46.3)
EOSINOPHIL # BLD AUTO: 0 10*3/MM3 (ref 0–0.7)
EOSINOPHIL # BLD AUTO: 0.04 10*3/MM3 (ref 0–0.7)
EOSINOPHIL # BLD AUTO: 0.06 10*3/MM3 (ref 0–0.7)
EOSINOPHIL # BLD AUTO: 0.49 10*3/MM3 (ref 0–0.7)
EOSINOPHIL NFR BLD AUTO: 0 % (ref 0–7)
EOSINOPHIL NFR BLD AUTO: 0.3 % (ref 0–7)
EOSINOPHIL NFR BLD AUTO: 0.7 % (ref 0–7)
EOSINOPHIL NFR BLD AUTO: 6.2 % (ref 0–7)
ERYTHROCYTE [DISTWIDTH] IN BLOOD BY AUTOMATED COUNT: 12.6 % (ref 11.5–14.5)
ERYTHROCYTE [DISTWIDTH] IN BLOOD BY AUTOMATED COUNT: 12.6 % (ref 11.5–14.5)
ERYTHROCYTE [DISTWIDTH] IN BLOOD BY AUTOMATED COUNT: 12.8 % (ref 11.5–14.5)
ERYTHROCYTE [DISTWIDTH] IN BLOOD BY AUTOMATED COUNT: 13.2 % (ref 11.5–14.5)
ERYTHROCYTE [DISTWIDTH] IN BLOOD BY AUTOMATED COUNT: 13.5 % (ref 11.5–14.5)
FERRITIN SERPL-MCNC: 89.4 NG/ML (ref 11.1–264)
FOLATE SERPL-MCNC: >20 NG/ML (ref 2.76–21)
GFR SERPL CREATININE-BSD FRML MDRD: 62 ML/MIN/1.73 (ref 60–90)
GFR SERPL CREATININE-BSD FRML MDRD: 63 ML/MIN/1.73 (ref 39–90)
GFR SERPL CREATININE-BSD FRML MDRD: 66 ML/MIN/1.73 (ref 39–90)
GFR SERPL CREATININE-BSD FRML MDRD: 71 ML/MIN/1.73 (ref 39–90)
GFR SERPL CREATININE-BSD FRML MDRD: 87 ML/MIN/1.73 (ref 39–90)
GLOBULIN UR ELPH-MCNC: 2.3 GM/DL (ref 2.3–3.5)
GLOBULIN UR ELPH-MCNC: 2.6 GM/DL (ref 2.3–3.5)
GLOBULIN UR ELPH-MCNC: 2.6 GM/DL (ref 2.3–3.5)
GLUCOSE BLD-MCNC: 100 MG/DL (ref 60–100)
GLUCOSE BLD-MCNC: 76 MG/DL (ref 60–100)
GLUCOSE BLD-MCNC: 78 MG/DL (ref 60–100)
GLUCOSE BLD-MCNC: 87 MG/DL (ref 60–100)
GLUCOSE BLD-MCNC: 87 MG/DL (ref 60–100)
GLUCOSE BLDA-MCNC: 217 MMOL/L
GLUCOSE BLDC GLUCOMTR-MCNC: 107 MG/DL (ref 70–130)
GLUCOSE BLDC GLUCOMTR-MCNC: 84 MG/DL (ref 70–130)
GLUCOSE BLDC GLUCOMTR-MCNC: 97 MG/DL (ref 70–130)
GLUCOSE UR STRIP-MCNC: NEGATIVE MG/DL
HCO3 BLDA-SCNC: 17.6 MMOL/L (ref 22–26)
HCT VFR BLD AUTO: 25.6 % (ref 35–45)
HCT VFR BLD AUTO: 27.5 % (ref 35–45)
HCT VFR BLD AUTO: 27.8 % (ref 35–45)
HCT VFR BLD AUTO: 28.6 % (ref 35–45)
HCT VFR BLD AUTO: 33.2 % (ref 35–45)
HCT VFR BLD CALC: 34 % (ref 38–47)
HGB BLD-MCNC: 11 G/DL (ref 12–15.5)
HGB BLD-MCNC: 8.4 G/DL (ref 12–15.5)
HGB BLD-MCNC: 8.9 G/DL (ref 12–15.5)
HGB BLD-MCNC: 9.3 G/DL (ref 12–15.5)
HGB BLD-MCNC: 9.3 G/DL (ref 12–15.5)
HGB BLDA-MCNC: 11.5 G/DL (ref 12–16)
HGB UR QL STRIP.AUTO: ABNORMAL
HOLD SPECIMEN: NORMAL
HYALINE CASTS UR QL AUTO: ABNORMAL /LPF
IMM GRANULOCYTES # BLD: 0.02 10*3/MM3 (ref 0–0.02)
IMM GRANULOCYTES # BLD: 0.05 10*3/MM3 (ref 0–0.02)
IMM GRANULOCYTES # BLD: 0.07 10*3/MM3 (ref 0–0.02)
IMM GRANULOCYTES # BLD: 0.08 10*3/MM3 (ref 0–0.02)
IMM GRANULOCYTES NFR BLD: 0.3 % (ref 0–0.5)
IMM GRANULOCYTES NFR BLD: 0.4 % (ref 0–0.5)
IMM GRANULOCYTES NFR BLD: 0.4 % (ref 0–0.5)
IMM GRANULOCYTES NFR BLD: 1 % (ref 0–0.5)
INR PPP: 0.98 (ref 0.8–1.2)
IRON 24H UR-MRATE: 18 MCG/DL (ref 37–170)
IRON SATN MFR SERPL: 10 % (ref 15–50)
KETONES UR QL STRIP: NEGATIVE
LEFT ATRIUM VOLUME INDEX: 41 ML/M2
LEUKOCYTE ESTERASE UR QL STRIP.AUTO: NEGATIVE
LV EF 2D ECHO EST: 15 %
LYMPHOCYTES # BLD AUTO: 1.24 10*3/MM3 (ref 0.6–4.2)
LYMPHOCYTES # BLD AUTO: 1.44 10*3/MM3 (ref 0.6–4.2)
LYMPHOCYTES # BLD AUTO: 2.09 10*3/MM3 (ref 0.6–4.2)
LYMPHOCYTES # BLD AUTO: 2.14 10*3/MM3 (ref 0.6–4.2)
LYMPHOCYTES NFR BLD AUTO: 11.8 % (ref 10–50)
LYMPHOCYTES NFR BLD AUTO: 13.6 % (ref 10–50)
LYMPHOCYTES NFR BLD AUTO: 15.2 % (ref 10–50)
LYMPHOCYTES NFR BLD AUTO: 26.4 % (ref 10–50)
MAXIMAL PREDICTED HEART RATE: 127 BPM
MCH RBC QN AUTO: 31.2 PG (ref 26.5–34)
MCH RBC QN AUTO: 31.8 PG (ref 26.5–34)
MCH RBC QN AUTO: 32.4 PG (ref 26.5–34)
MCH RBC QN AUTO: 32.4 PG (ref 26.5–34)
MCH RBC QN AUTO: 32.9 PG (ref 26.5–34)
MCHC RBC AUTO-ENTMCNC: 32.4 G/DL (ref 31.4–36)
MCHC RBC AUTO-ENTMCNC: 32.5 G/DL (ref 31.4–36)
MCHC RBC AUTO-ENTMCNC: 32.8 G/DL (ref 31.4–36)
MCHC RBC AUTO-ENTMCNC: 33.1 G/DL (ref 31.4–36)
MCHC RBC AUTO-ENTMCNC: 33.5 G/DL (ref 31.4–36)
MCV RBC AUTO: 100 FL (ref 80–98)
MCV RBC AUTO: 96 FL (ref 80–98)
MCV RBC AUTO: 96.9 FL (ref 80–98)
MCV RBC AUTO: 97 FL (ref 80–98)
MCV RBC AUTO: 99.4 FL (ref 80–98)
MODALITY: ABNORMAL
MONOCYTES # BLD AUTO: 0.66 10*3/MM3 (ref 0–0.9)
MONOCYTES # BLD AUTO: 0.72 10*3/MM3 (ref 0–0.9)
MONOCYTES # BLD AUTO: 0.77 10*3/MM3 (ref 0–0.9)
MONOCYTES # BLD AUTO: 0.82 10*3/MM3 (ref 0–0.9)
MONOCYTES NFR BLD AUTO: 10 % (ref 0–12)
MONOCYTES NFR BLD AUTO: 4.9 % (ref 0–12)
MONOCYTES NFR BLD AUTO: 5.9 % (ref 0–12)
MONOCYTES NFR BLD AUTO: 8.3 % (ref 0–12)
NEUTROPHILS # BLD AUTO: 10.01 10*3/MM3 (ref 2–8.6)
NEUTROPHILS # BLD AUTO: 12.67 10*3/MM3 (ref 2–8.6)
NEUTROPHILS # BLD AUTO: 4.63 10*3/MM3 (ref 2–8.6)
NEUTROPHILS # BLD AUTO: 5.92 10*3/MM3 (ref 2–8.6)
NEUTROPHILS NFR BLD AUTO: 58.3 % (ref 37–80)
NEUTROPHILS NFR BLD AUTO: 72.6 % (ref 37–80)
NEUTROPHILS NFR BLD AUTO: 80.6 % (ref 37–80)
NEUTROPHILS NFR BLD AUTO: 81.8 % (ref 37–80)
NITRITE UR QL STRIP: NEGATIVE
NRBC BLD MANUAL-RTO: 0 /100 WBC (ref 0–0)
NRBC BLD MANUAL-RTO: 0 /100 WBC (ref 0–0)
PCO2 BLDA: 31.8 MM HG (ref 35–45)
PH BLDA: 7.36 PH UNITS (ref 7.35–7.45)
PH UR STRIP.AUTO: 5.5 [PH] (ref 5–9)
PLATELET # BLD AUTO: 156 10*3/MM3 (ref 150–450)
PLATELET # BLD AUTO: 174 10*3/MM3 (ref 150–450)
PLATELET # BLD AUTO: 189 10*3/MM3 (ref 150–450)
PLATELET # BLD AUTO: 218 10*3/MM3 (ref 150–450)
PLATELET # BLD AUTO: 236 10*3/MM3 (ref 150–450)
PMV BLD AUTO: 10.1 FL (ref 8–12)
PMV BLD AUTO: 10.6 FL (ref 8–12)
PMV BLD AUTO: 10.7 FL (ref 8–12)
PMV BLD AUTO: 9.9 FL (ref 8–12)
PMV BLD AUTO: 9.9 FL (ref 8–12)
PO2 BLDA: 99.7 MM HG (ref 80–105)
POTASSIUM BLD-SCNC: 3.6 MMOL/L (ref 3.5–5.1)
POTASSIUM BLD-SCNC: 3.7 MMOL/L (ref 3.5–5.1)
POTASSIUM BLD-SCNC: 3.9 MMOL/L (ref 3.5–5.1)
POTASSIUM BLD-SCNC: 4.1 MMOL/L (ref 3.5–5.1)
POTASSIUM BLD-SCNC: 4.4 MMOL/L (ref 3.5–5.1)
POTASSIUM BLDA-SCNC: 4.17 MMOL/L (ref 3.6–4.9)
PROT SERPL-MCNC: 4.5 G/DL (ref 6.3–8.6)
PROT SERPL-MCNC: 5.4 G/DL (ref 6.3–8.6)
PROT SERPL-MCNC: 6 G/DL (ref 6.3–8.6)
PROT UR QL STRIP: NEGATIVE
PROTHROMBIN TIME: 12.9 SECONDS (ref 11.1–15.3)
RBC # BLD AUTO: 2.64 10*6/MM3 (ref 3.77–5.16)
RBC # BLD AUTO: 2.75 10*6/MM3 (ref 3.77–5.16)
RBC # BLD AUTO: 2.87 10*6/MM3 (ref 3.77–5.16)
RBC # BLD AUTO: 2.98 10*6/MM3 (ref 3.77–5.16)
RBC # BLD AUTO: 3.34 10*6/MM3 (ref 3.77–5.16)
RBC # UR: ABNORMAL /HPF
REF LAB TEST METHOD: ABNORMAL
SAO2 % BLDCOA: 97.1 % (ref 94–100)
SODIUM BLD-SCNC: 133 MMOL/L (ref 137–145)
SODIUM BLD-SCNC: 138 MMOL/L (ref 137–145)
SODIUM BLD-SCNC: 140 MMOL/L (ref 137–145)
SODIUM BLD-SCNC: 141 MMOL/L (ref 137–145)
SODIUM BLD-SCNC: 143 MMOL/L (ref 137–145)
SODIUM BLDA-SCNC: 138.8 MMOL/L (ref 138–146)
SP GR UR STRIP: 1.02 (ref 1–1.03)
SQUAMOUS #/AREA URNS HPF: ABNORMAL /HPF
STRESS TARGET HR: 108 BPM
TIBC SERPL-MCNC: 175 MCG/DL (ref 265–497)
TROPONIN I SERPL-MCNC: 1.13 NG/ML
UROBILINOGEN UR QL STRIP: ABNORMAL
VIT B12 BLD-MCNC: >1000 PG/ML (ref 239–931)
WBC NRBC COR # BLD: 12.23 10*3/MM3 (ref 3.2–9.8)
WBC NRBC COR # BLD: 15.72 10*3/MM3 (ref 3.2–9.8)
WBC NRBC COR # BLD: 7.39 10*3/MM3 (ref 3.2–9.8)
WBC NRBC COR # BLD: 7.93 10*3/MM3 (ref 3.2–9.8)
WBC NRBC COR # BLD: 8.16 10*3/MM3 (ref 3.2–9.8)
WBC UR QL AUTO: ABNORMAL /HPF
WHOLE BLOOD HOLD SPECIMEN: NORMAL

## 2018-01-01 PROCEDURE — 99024 POSTOP FOLLOW-UP VISIT: CPT | Performed by: ORTHOPAEDIC SURGERY

## 2018-01-01 PROCEDURE — 92526 ORAL FUNCTION THERAPY: CPT | Performed by: SPEECH-LANGUAGE PATHOLOGIST

## 2018-01-01 PROCEDURE — 96374 THER/PROPH/DIAG INJ IV PUSH: CPT

## 2018-01-01 PROCEDURE — 94799 UNLISTED PULMONARY SVC/PX: CPT

## 2018-01-01 PROCEDURE — 27235 TREAT THIGH FRACTURE: CPT | Performed by: ORTHOPAEDIC SURGERY

## 2018-01-01 PROCEDURE — 97162 PT EVAL MOD COMPLEX 30 MIN: CPT

## 2018-01-01 PROCEDURE — 25010000002 CEFTRIAXONE PER 250 MG: Performed by: PHYSICIAN ASSISTANT

## 2018-01-01 PROCEDURE — 25010000002 PROPOFOL 10 MG/ML EMULSION: Performed by: NURSE ANESTHETIST, CERTIFIED REGISTERED

## 2018-01-01 PROCEDURE — 82550 ASSAY OF CK (CPK): CPT | Performed by: PHYSICIAN ASSISTANT

## 2018-01-01 PROCEDURE — 25010000002 PIPERACILLIN SOD-TAZOBACTAM PER 1 G: Performed by: PHYSICIAN ASSISTANT

## 2018-01-01 PROCEDURE — 83550 IRON BINDING TEST: CPT | Performed by: PHYSICIAN ASSISTANT

## 2018-01-01 PROCEDURE — 97530 THERAPEUTIC ACTIVITIES: CPT

## 2018-01-01 PROCEDURE — 25010000002 AMIODARONE IN DEXTROSE 5% 360-4.14 MG/200ML-% SOLUTION: Performed by: PHYSICIAN ASSISTANT

## 2018-01-01 PROCEDURE — 97110 THERAPEUTIC EXERCISES: CPT

## 2018-01-01 PROCEDURE — 71045 X-RAY EXAM CHEST 1 VIEW: CPT

## 2018-01-01 PROCEDURE — 85025 COMPLETE CBC W/AUTO DIFF WBC: CPT | Performed by: PHYSICIAN ASSISTANT

## 2018-01-01 PROCEDURE — C1713 ANCHOR/SCREW BN/BN,TIS/BN: HCPCS | Performed by: ORTHOPAEDIC SURGERY

## 2018-01-01 PROCEDURE — 25010000002 LORAZEPAM PER 2 MG: Performed by: INTERNAL MEDICINE

## 2018-01-01 PROCEDURE — 82728 ASSAY OF FERRITIN: CPT | Performed by: PHYSICIAN ASSISTANT

## 2018-01-01 PROCEDURE — 85025 COMPLETE CBC W/AUTO DIFF WBC: CPT | Performed by: HOSPITALIST

## 2018-01-01 PROCEDURE — 85610 PROTHROMBIN TIME: CPT | Performed by: HOSPITALIST

## 2018-01-01 PROCEDURE — 76000 FLUOROSCOPY <1 HR PHYS/QHP: CPT

## 2018-01-01 PROCEDURE — 25010000003 MORPHINE PER 10 MG: Performed by: INTERNAL MEDICINE

## 2018-01-01 PROCEDURE — 25010000002 ONDANSETRON PER 1 MG: Performed by: HOSPITALIST

## 2018-01-01 PROCEDURE — 80053 COMPREHEN METABOLIC PANEL: CPT | Performed by: PHYSICIAN ASSISTANT

## 2018-01-01 PROCEDURE — 73502 X-RAY EXAM HIP UNI 2-3 VIEWS: CPT | Performed by: ORTHOPAEDIC SURGERY

## 2018-01-01 PROCEDURE — 99283 EMERGENCY DEPT VISIT LOW MDM: CPT

## 2018-01-01 PROCEDURE — 25010000002 ENOXAPARIN PER 10 MG: Performed by: ORTHOPAEDIC SURGERY

## 2018-01-01 PROCEDURE — 25010000002 ENOXAPARIN PER 10 MG: Performed by: PHYSICIAN ASSISTANT

## 2018-01-01 PROCEDURE — 80053 COMPREHEN METABOLIC PANEL: CPT | Performed by: HOSPITALIST

## 2018-01-01 PROCEDURE — 80048 BASIC METABOLIC PNL TOTAL CA: CPT | Performed by: HOSPITALIST

## 2018-01-01 PROCEDURE — 25010000003 CEFAZOLIN PER 500 MG: Performed by: ORTHOPAEDIC SURGERY

## 2018-01-01 PROCEDURE — 93010 ELECTROCARDIOGRAM REPORT: CPT | Performed by: INTERNAL MEDICINE

## 2018-01-01 PROCEDURE — 92610 EVALUATE SWALLOWING FUNCTION: CPT | Performed by: SPEECH-LANGUAGE PATHOLOGIST

## 2018-01-01 PROCEDURE — 85730 THROMBOPLASTIN TIME PARTIAL: CPT | Performed by: HOSPITALIST

## 2018-01-01 PROCEDURE — 36600 WITHDRAWAL OF ARTERIAL BLOOD: CPT

## 2018-01-01 PROCEDURE — 85027 COMPLETE CBC AUTOMATED: CPT | Performed by: ORTHOPAEDIC SURGERY

## 2018-01-01 PROCEDURE — C1751 CATH, INF, PER/CENT/MIDLINE: HCPCS

## 2018-01-01 PROCEDURE — 80048 BASIC METABOLIC PNL TOTAL CA: CPT | Performed by: ORTHOPAEDIC SURGERY

## 2018-01-01 PROCEDURE — 25010000002 MORPHINE PER 10 MG: Performed by: INTERNAL MEDICINE

## 2018-01-01 PROCEDURE — 73502 X-RAY EXAM HIP UNI 2-3 VIEWS: CPT

## 2018-01-01 PROCEDURE — 93005 ELECTROCARDIOGRAM TRACING: CPT | Performed by: PHYSICIAN ASSISTANT

## 2018-01-01 PROCEDURE — G8997 SWALLOW GOAL STATUS: HCPCS | Performed by: SPEECH-LANGUAGE PATHOLOGIST

## 2018-01-01 PROCEDURE — 82553 CREATINE MB FRACTION: CPT | Performed by: PHYSICIAN ASSISTANT

## 2018-01-01 PROCEDURE — 94760 N-INVAS EAR/PLS OXIMETRY 1: CPT

## 2018-01-01 PROCEDURE — 93306 TTE W/DOPPLER COMPLETE: CPT | Performed by: INTERNAL MEDICINE

## 2018-01-01 PROCEDURE — 73030 X-RAY EXAM OF SHOULDER: CPT

## 2018-01-01 PROCEDURE — 87086 URINE CULTURE/COLONY COUNT: CPT | Performed by: HOSPITALIST

## 2018-01-01 PROCEDURE — 25010000003 CEFAZOLIN PER 500 MG: Performed by: NURSE ANESTHETIST, CERTIFIED REGISTERED

## 2018-01-01 PROCEDURE — 93306 TTE W/DOPPLER COMPLETE: CPT

## 2018-01-01 PROCEDURE — 97166 OT EVAL MOD COMPLEX 45 MIN: CPT

## 2018-01-01 PROCEDURE — 82962 GLUCOSE BLOOD TEST: CPT

## 2018-01-01 PROCEDURE — 25010000002 FUROSEMIDE PER 20 MG: Performed by: PHYSICIAN ASSISTANT

## 2018-01-01 PROCEDURE — 25010000002 MORPHINE PER 10 MG: Performed by: HOSPITALIST

## 2018-01-01 PROCEDURE — 81001 URINALYSIS AUTO W/SCOPE: CPT | Performed by: HOSPITALIST

## 2018-01-01 PROCEDURE — 25010000002 NA FERRIC GLUC CPLX PER 12.5 MG: Performed by: PHYSICIAN ASSISTANT

## 2018-01-01 PROCEDURE — G8982 BODY POS GOAL STATUS: HCPCS

## 2018-01-01 PROCEDURE — 99221 1ST HOSP IP/OBS SF/LOW 40: CPT | Performed by: ORTHOPAEDIC SURGERY

## 2018-01-01 PROCEDURE — 25010000002 AMIODARONE IN DEXTROSE 5% 150-4.21 MG/100ML-% SOLUTION: Performed by: PHYSICIAN ASSISTANT

## 2018-01-01 PROCEDURE — 0QS634Z REPOSITION RIGHT UPPER FEMUR WITH INTERNAL FIXATION DEVICE, PERCUTANEOUS APPROACH: ICD-10-PCS | Performed by: ORTHOPAEDIC SURGERY

## 2018-01-01 PROCEDURE — 84484 ASSAY OF TROPONIN QUANT: CPT | Performed by: PHYSICIAN ASSISTANT

## 2018-01-01 PROCEDURE — 87077 CULTURE AEROBIC IDENTIFY: CPT | Performed by: HOSPITALIST

## 2018-01-01 PROCEDURE — 83540 ASSAY OF IRON: CPT | Performed by: PHYSICIAN ASSISTANT

## 2018-01-01 PROCEDURE — G8988 SELF CARE GOAL STATUS: HCPCS

## 2018-01-01 PROCEDURE — 82607 VITAMIN B-12: CPT | Performed by: PHYSICIAN ASSISTANT

## 2018-01-01 PROCEDURE — 82803 BLOOD GASES ANY COMBINATION: CPT | Performed by: PHYSICIAN ASSISTANT

## 2018-01-01 PROCEDURE — G8987 SELF CARE CURRENT STATUS: HCPCS

## 2018-01-01 PROCEDURE — 25010000002 METHYLPREDNISOLONE PER 125 MG: Performed by: PHYSICIAN ASSISTANT

## 2018-01-01 PROCEDURE — G8996 SWALLOW CURRENT STATUS: HCPCS | Performed by: SPEECH-LANGUAGE PATHOLOGIST

## 2018-01-01 PROCEDURE — 76937 US GUIDE VASCULAR ACCESS: CPT

## 2018-01-01 PROCEDURE — G8998 SWALLOW D/C STATUS: HCPCS | Performed by: SPEECH-LANGUAGE PATHOLOGIST

## 2018-01-01 PROCEDURE — G8981 BODY POS CURRENT STATUS: HCPCS

## 2018-01-01 PROCEDURE — 73501 X-RAY EXAM HIP UNI 1 VIEW: CPT

## 2018-01-01 PROCEDURE — 87186 SC STD MICRODIL/AGAR DIL: CPT | Performed by: HOSPITALIST

## 2018-01-01 PROCEDURE — 87040 BLOOD CULTURE FOR BACTERIA: CPT | Performed by: PHYSICIAN ASSISTANT

## 2018-01-01 PROCEDURE — 82746 ASSAY OF FOLIC ACID SERUM: CPT | Performed by: PHYSICIAN ASSISTANT

## 2018-01-01 DEVICE — IMPLANTABLE DEVICE: Type: IMPLANTABLE DEVICE | Site: HIP | Status: FUNCTIONAL

## 2018-01-01 DEVICE — SCRW CANN THRD 7.3X16X75MM: Type: IMPLANTABLE DEVICE | Site: HIP | Status: FUNCTIONAL

## 2018-01-01 RX ORDER — ASPIRIN 325 MG
325 TABLET ORAL DAILY
Status: DISCONTINUED | OUTPATIENT
Start: 2018-01-01 | End: 2018-01-01

## 2018-01-01 RX ORDER — FUROSEMIDE 10 MG/ML
40 INJECTION INTRAMUSCULAR; INTRAVENOUS ONCE
Status: COMPLETED | OUTPATIENT
Start: 2018-01-01 | End: 2018-01-01

## 2018-01-01 RX ORDER — MORPHINE SULFATE 2 MG/ML
4 INJECTION, SOLUTION INTRAMUSCULAR; INTRAVENOUS ONCE
Status: COMPLETED | OUTPATIENT
Start: 2018-01-01 | End: 2018-01-01

## 2018-01-01 RX ORDER — BUPIVACAINE HYDROCHLORIDE 5 MG/ML
INJECTION, SOLUTION EPIDURAL; INTRACAUDAL AS NEEDED
Status: DISCONTINUED | OUTPATIENT
Start: 2018-01-01 | End: 2018-01-01 | Stop reason: HOSPADM

## 2018-01-01 RX ORDER — MORPHINE SULFATE 2 MG/ML
1 INJECTION, SOLUTION INTRAMUSCULAR; INTRAVENOUS
Status: DISCONTINUED | OUTPATIENT
Start: 2018-01-01 | End: 2018-01-01

## 2018-01-01 RX ORDER — PROPOFOL 10 MG/ML
VIAL (ML) INTRAVENOUS AS NEEDED
Status: DISCONTINUED | OUTPATIENT
Start: 2018-01-01 | End: 2018-01-01 | Stop reason: SURG

## 2018-01-01 RX ORDER — DIPHENHYDRAMINE HYDROCHLORIDE 50 MG/ML
50 INJECTION INTRAMUSCULAR; INTRAVENOUS ONCE
Status: DISCONTINUED | OUTPATIENT
Start: 2018-01-01 | End: 2018-01-01

## 2018-01-01 RX ORDER — ONDANSETRON 2 MG/ML
4 INJECTION INTRAMUSCULAR; INTRAVENOUS EVERY 6 HOURS PRN
Status: DISCONTINUED | OUTPATIENT
Start: 2018-01-01 | End: 2018-01-01 | Stop reason: HOSPADM

## 2018-01-01 RX ORDER — LIDOCAINE HYDROCHLORIDE 10 MG/ML
INJECTION, SOLUTION EPIDURAL; INFILTRATION; INTRACAUDAL; PERINEURAL AS NEEDED
Status: DISCONTINUED | OUTPATIENT
Start: 2018-01-01 | End: 2018-01-01 | Stop reason: HOSPADM

## 2018-01-01 RX ORDER — SODIUM CHLORIDE 0.9 % (FLUSH) 0.9 %
1-10 SYRINGE (ML) INJECTION AS NEEDED
Status: DISCONTINUED | OUTPATIENT
Start: 2018-01-01 | End: 2018-01-01 | Stop reason: HOSPADM

## 2018-01-01 RX ORDER — LOSARTAN POTASSIUM 50 MG/1
100 TABLET ORAL
Status: DISCONTINUED | OUTPATIENT
Start: 2018-01-01 | End: 2018-01-01

## 2018-01-01 RX ORDER — LANOLIN ALCOHOL/MO/W.PET/CERES
3 CREAM (GRAM) TOPICAL NIGHTLY
COMMUNITY

## 2018-01-01 RX ORDER — HYDROMORPHONE HCL 110MG/55ML
0.5 PATIENT CONTROLLED ANALGESIA SYRINGE INTRAVENOUS
Status: DISCONTINUED | OUTPATIENT
Start: 2018-01-01 | End: 2018-01-01 | Stop reason: HOSPADM

## 2018-01-01 RX ORDER — ALBUTEROL SULFATE 2.5 MG/3ML
2.5 SOLUTION RESPIRATORY (INHALATION) EVERY 6 HOURS PRN
Status: DISCONTINUED | OUTPATIENT
Start: 2018-01-01 | End: 2018-01-01

## 2018-01-01 RX ORDER — LORAZEPAM 2 MG/ML
0.5 INJECTION INTRAMUSCULAR EVERY 4 HOURS
Status: DISCONTINUED | OUTPATIENT
Start: 2018-01-01 | End: 2018-01-01 | Stop reason: HOSPADM

## 2018-01-01 RX ORDER — ONDANSETRON 2 MG/ML
4 INJECTION INTRAMUSCULAR; INTRAVENOUS ONCE AS NEEDED
Status: DISCONTINUED | OUTPATIENT
Start: 2018-01-01 | End: 2018-01-01 | Stop reason: HOSPADM

## 2018-01-01 RX ORDER — SODIUM CHLORIDE, SODIUM GLUCONATE, SODIUM ACETATE, POTASSIUM CHLORIDE, AND MAGNESIUM CHLORIDE 526; 502; 368; 37; 30 MG/100ML; MG/100ML; MG/100ML; MG/100ML; MG/100ML
75 INJECTION, SOLUTION INTRAVENOUS CONTINUOUS
Status: DISCONTINUED | OUTPATIENT
Start: 2018-01-01 | End: 2018-01-01

## 2018-01-01 RX ORDER — KETAMINE HYDROCHLORIDE 100 MG/ML
INJECTION INTRAMUSCULAR; INTRAVENOUS AS NEEDED
Status: DISCONTINUED | OUTPATIENT
Start: 2018-01-01 | End: 2018-01-01 | Stop reason: SURG

## 2018-01-01 RX ORDER — TROLAMINE SALICYLATE 10 G/100G
CREAM TOPICAL AS NEEDED
COMMUNITY

## 2018-01-01 RX ORDER — MORPHINE SULFATE 1 MG/ML
INJECTION INTRAVENOUS CONTINUOUS
Status: DISCONTINUED | OUTPATIENT
Start: 2018-01-01 | End: 2018-01-01 | Stop reason: HOSPADM

## 2018-01-01 RX ORDER — HYDRALAZINE HYDROCHLORIDE 25 MG/1
25 TABLET, FILM COATED ORAL EVERY 12 HOURS SCHEDULED
Status: DISCONTINUED | OUTPATIENT
Start: 2018-01-01 | End: 2018-01-01

## 2018-01-01 RX ORDER — PANTOPRAZOLE SODIUM 40 MG/1
40 TABLET, DELAYED RELEASE ORAL
Status: DISCONTINUED | OUTPATIENT
Start: 2018-01-01 | End: 2018-01-01

## 2018-01-01 RX ORDER — FLUTICASONE PROPIONATE 50 MCG
2 SPRAY, SUSPENSION (ML) NASAL DAILY
Status: DISCONTINUED | OUTPATIENT
Start: 2018-01-01 | End: 2018-01-01

## 2018-01-01 RX ORDER — DIPHENHYDRAMINE HYDROCHLORIDE 50 MG/ML
12.5 INJECTION INTRAMUSCULAR; INTRAVENOUS
Status: DISCONTINUED | OUTPATIENT
Start: 2018-01-01 | End: 2018-01-01 | Stop reason: HOSPADM

## 2018-01-01 RX ORDER — ACETAMINOPHEN 325 MG/1
650 TABLET ORAL ONCE AS NEEDED
Status: DISCONTINUED | OUTPATIENT
Start: 2018-01-01 | End: 2018-01-01 | Stop reason: HOSPADM

## 2018-01-01 RX ORDER — ALPRAZOLAM 0.25 MG/1
0.25 TABLET ORAL NIGHTLY PRN
Status: DISCONTINUED | OUTPATIENT
Start: 2018-01-01 | End: 2018-01-01

## 2018-01-01 RX ORDER — CLOPIDOGREL BISULFATE 75 MG/1
75 TABLET ORAL DAILY
Status: DISCONTINUED | OUTPATIENT
Start: 2018-01-01 | End: 2018-01-01

## 2018-01-01 RX ORDER — LEVOTHYROXINE SODIUM 0.07 MG/1
75 TABLET ORAL
Status: DISCONTINUED | OUTPATIENT
Start: 2018-01-01 | End: 2018-01-01

## 2018-01-01 RX ORDER — ACETAMINOPHEN 650 MG/1
650 SUPPOSITORY RECTAL ONCE AS NEEDED
Status: DISCONTINUED | OUTPATIENT
Start: 2018-01-01 | End: 2018-01-01 | Stop reason: HOSPADM

## 2018-01-01 RX ORDER — NALOXONE HCL 0.4 MG/ML
0.1 VIAL (ML) INJECTION
Status: DISCONTINUED | OUTPATIENT
Start: 2018-01-01 | End: 2018-01-01

## 2018-01-01 RX ORDER — FLUMAZENIL 0.1 MG/ML
0.2 INJECTION INTRAVENOUS AS NEEDED
Status: DISCONTINUED | OUTPATIENT
Start: 2018-01-01 | End: 2018-01-01 | Stop reason: HOSPADM

## 2018-01-01 RX ORDER — NITROGLYCERIN 20 MG/100ML
5-200 INJECTION INTRAVENOUS
Status: DISCONTINUED | OUTPATIENT
Start: 2018-01-01 | End: 2018-01-01

## 2018-01-01 RX ORDER — SCOLOPAMINE TRANSDERMAL SYSTEM 1 MG/1
1 PATCH, EXTENDED RELEASE TRANSDERMAL
Status: DISCONTINUED | OUTPATIENT
Start: 2018-01-01 | End: 2018-01-01 | Stop reason: HOSPADM

## 2018-01-01 RX ORDER — BACITRACIN 50000 [IU]/1
INJECTION, POWDER, FOR SOLUTION INTRAMUSCULAR AS NEEDED
Status: DISCONTINUED | OUTPATIENT
Start: 2018-01-01 | End: 2018-01-01 | Stop reason: HOSPADM

## 2018-01-01 RX ORDER — CEFAZOLIN SODIUM 1 G/3ML
INJECTION, POWDER, FOR SOLUTION INTRAMUSCULAR; INTRAVENOUS AS NEEDED
Status: DISCONTINUED | OUTPATIENT
Start: 2018-01-01 | End: 2018-01-01 | Stop reason: SURG

## 2018-01-01 RX ORDER — FAMOTIDINE 10 MG/ML
20 INJECTION, SOLUTION INTRAVENOUS DAILY
Status: DISCONTINUED | OUTPATIENT
Start: 2018-01-01 | End: 2018-01-01

## 2018-01-01 RX ORDER — BISACODYL 10 MG
10 SUPPOSITORY, RECTAL RECTAL DAILY PRN
Status: DISCONTINUED | OUTPATIENT
Start: 2018-01-01 | End: 2018-01-01 | Stop reason: HOSPADM

## 2018-01-01 RX ORDER — SODIUM CHLORIDE 9 MG/ML
75 INJECTION, SOLUTION INTRAVENOUS CONTINUOUS
Status: DISCONTINUED | OUTPATIENT
Start: 2018-01-01 | End: 2018-01-01

## 2018-01-01 RX ORDER — TROLAMINE SALICYLATE 10 G/100G
CREAM TOPICAL 3 TIMES DAILY PRN
Status: DISCONTINUED | OUTPATIENT
Start: 2018-01-01 | End: 2018-01-01 | Stop reason: HOSPADM

## 2018-01-01 RX ORDER — HYDROCODONE BITARTRATE AND ACETAMINOPHEN 5; 325 MG/1; MG/1
1 TABLET ORAL ONCE
Status: DISCONTINUED | OUTPATIENT
Start: 2018-01-01 | End: 2018-01-01

## 2018-01-01 RX ORDER — HYDRALAZINE HYDROCHLORIDE 20 MG/ML
10 INJECTION INTRAMUSCULAR; INTRAVENOUS EVERY 6 HOURS PRN
Status: DISCONTINUED | OUTPATIENT
Start: 2018-01-01 | End: 2018-01-01

## 2018-01-01 RX ORDER — NALOXONE HCL 0.4 MG/ML
0.2 VIAL (ML) INJECTION AS NEEDED
Status: DISCONTINUED | OUTPATIENT
Start: 2018-01-01 | End: 2018-01-01 | Stop reason: HOSPADM

## 2018-01-01 RX ORDER — METOPROLOL TARTRATE 5 MG/5ML
5 INJECTION INTRAVENOUS ONCE
Status: DISCONTINUED | OUTPATIENT
Start: 2018-01-01 | End: 2018-01-01

## 2018-01-01 RX ORDER — ACETAMINOPHEN 650 MG/1
650 SUPPOSITORY RECTAL EVERY 4 HOURS PRN
Status: DISCONTINUED | OUTPATIENT
Start: 2018-01-01 | End: 2018-01-01 | Stop reason: HOSPADM

## 2018-01-01 RX ORDER — LORAZEPAM 2 MG/ML
1 INJECTION INTRAMUSCULAR
Status: DISCONTINUED | OUTPATIENT
Start: 2018-01-01 | End: 2018-01-01 | Stop reason: HOSPADM

## 2018-01-01 RX ORDER — ACETAMINOPHEN 325 MG/1
650 TABLET ORAL EVERY 4 HOURS PRN
Status: DISCONTINUED | OUTPATIENT
Start: 2018-01-01 | End: 2018-01-01

## 2018-01-01 RX ORDER — MORPHINE SULFATE 2 MG/ML
2 INJECTION, SOLUTION INTRAMUSCULAR; INTRAVENOUS
Status: DISCONTINUED | OUTPATIENT
Start: 2018-01-01 | End: 2018-01-01 | Stop reason: HOSPADM

## 2018-01-01 RX ORDER — CHOLECALCIFEROL (VITAMIN D3) 125 MCG
5 CAPSULE ORAL NIGHTLY
COMMUNITY

## 2018-01-01 RX ORDER — DOCUSATE SODIUM 50 MG/5ML
100 LIQUID ORAL 2 TIMES DAILY PRN
Status: DISCONTINUED | OUTPATIENT
Start: 2018-01-01 | End: 2018-01-01

## 2018-01-01 RX ORDER — EPHEDRINE SULFATE 50 MG/ML
5 INJECTION, SOLUTION INTRAVENOUS ONCE AS NEEDED
Status: DISCONTINUED | OUTPATIENT
Start: 2018-01-01 | End: 2018-01-01 | Stop reason: HOSPADM

## 2018-01-01 RX ORDER — DOCUSATE SODIUM 100 MG/1
100 CAPSULE, LIQUID FILLED ORAL 2 TIMES DAILY PRN
Status: DISCONTINUED | OUTPATIENT
Start: 2018-01-01 | End: 2018-01-01 | Stop reason: CLARIF

## 2018-01-01 RX ORDER — HALOPERIDOL 5 MG/ML
1 INJECTION INTRAMUSCULAR EVERY 6 HOURS PRN
Status: DISCONTINUED | OUTPATIENT
Start: 2018-01-01 | End: 2018-01-01 | Stop reason: HOSPADM

## 2018-01-01 RX ORDER — BACTERIOSTATIC SODIUM CHLORIDE 0.9 %
VIAL (ML) INJECTION AS NEEDED
Status: DISCONTINUED | OUTPATIENT
Start: 2018-01-01 | End: 2018-01-01 | Stop reason: HOSPADM

## 2018-01-01 RX ORDER — METHYLPREDNISOLONE SODIUM SUCCINATE 125 MG/2ML
125 INJECTION, POWDER, LYOPHILIZED, FOR SOLUTION INTRAMUSCULAR; INTRAVENOUS ONCE
Status: COMPLETED | OUTPATIENT
Start: 2018-01-01 | End: 2018-01-01

## 2018-01-01 RX ORDER — LORAZEPAM 2 MG/ML
2 INJECTION INTRAMUSCULAR ONCE
Status: COMPLETED | OUTPATIENT
Start: 2018-01-01 | End: 2018-01-01

## 2018-01-01 RX ORDER — LABETALOL HYDROCHLORIDE 5 MG/ML
5 INJECTION, SOLUTION INTRAVENOUS
Status: DISCONTINUED | OUTPATIENT
Start: 2018-01-01 | End: 2018-01-01 | Stop reason: HOSPADM

## 2018-01-01 RX ADMIN — LEVOTHYROXINE SODIUM 75 MCG: 75 TABLET ORAL at 06:33

## 2018-01-01 RX ADMIN — FAMOTIDINE 20 MG: 10 INJECTION, SOLUTION INTRAVENOUS at 09:13

## 2018-01-01 RX ADMIN — KETAMINE HYDROCHLORIDE 10 MG: 100 INJECTION INTRAMUSCULAR; INTRAVENOUS at 15:45

## 2018-01-01 RX ADMIN — PROPOFOL 10 MG: 10 INJECTION, EMULSION INTRAVENOUS at 15:20

## 2018-01-01 RX ADMIN — PANTOPRAZOLE SODIUM 40 MG: 40 TABLET, DELAYED RELEASE ORAL at 06:33

## 2018-01-01 RX ADMIN — AMIODARONE HYDROCHLORIDE 1 MG/MIN: 1.8 INJECTION, SOLUTION INTRAVENOUS at 15:58

## 2018-01-01 RX ADMIN — FAMOTIDINE 20 MG: 10 INJECTION, SOLUTION INTRAVENOUS at 10:27

## 2018-01-01 RX ADMIN — ASPIRIN 325 MG: 325 TABLET, COATED ORAL at 08:34

## 2018-01-01 RX ADMIN — MORPHINE SULFATE 4 MG: 2 INJECTION, SOLUTION INTRAMUSCULAR; INTRAVENOUS at 17:52

## 2018-01-01 RX ADMIN — DOXYCYCLINE 100 MG: 100 INJECTION, POWDER, LYOPHILIZED, FOR SOLUTION INTRAVENOUS at 05:59

## 2018-01-01 RX ADMIN — ASPIRIN 325 MG: 325 TABLET, COATED ORAL at 12:31

## 2018-01-01 RX ADMIN — PANTOPRAZOLE SODIUM 40 MG: 40 TABLET, DELAYED RELEASE ORAL at 06:20

## 2018-01-01 RX ADMIN — SCOPALAMINE 1 PATCH: 1 PATCH, EXTENDED RELEASE TRANSDERMAL at 11:12

## 2018-01-01 RX ADMIN — ACETAMINOPHEN 650 MG: 325 TABLET ORAL at 14:30

## 2018-01-01 RX ADMIN — FAMOTIDINE 20 MG: 10 INJECTION, SOLUTION INTRAVENOUS at 08:04

## 2018-01-01 RX ADMIN — TROLAMINE SALICYLATE: 10 CREAM TOPICAL at 04:10

## 2018-01-01 RX ADMIN — ACETAMINOPHEN 650 MG: 325 TABLET ORAL at 02:58

## 2018-01-01 RX ADMIN — Medication: at 18:20

## 2018-01-01 RX ADMIN — ENOXAPARIN SODIUM 30 MG: 30 INJECTION SUBCUTANEOUS at 14:17

## 2018-01-01 RX ADMIN — MAGNESIUM HYDROXIDE 10 ML: 2400 SUSPENSION ORAL at 18:07

## 2018-01-01 RX ADMIN — SODIUM CHLORIDE 75 ML/HR: 9 INJECTION, SOLUTION INTRAVENOUS at 21:32

## 2018-01-01 RX ADMIN — DOXYCYCLINE 100 MG: 100 INJECTION, POWDER, LYOPHILIZED, FOR SOLUTION INTRAVENOUS at 19:19

## 2018-01-01 RX ADMIN — FUROSEMIDE 40 MG: 10 INJECTION, SOLUTION INTRAMUSCULAR; INTRAVENOUS at 13:12

## 2018-01-01 RX ADMIN — ACETAMINOPHEN 650 MG: 325 TABLET ORAL at 13:27

## 2018-01-01 RX ADMIN — Medication: at 19:52

## 2018-01-01 RX ADMIN — LORAZEPAM 0.5 MG: 2 INJECTION INTRAMUSCULAR; INTRAVENOUS at 22:11

## 2018-01-01 RX ADMIN — LORAZEPAM 0.5 MG: 2 INJECTION INTRAMUSCULAR; INTRAVENOUS at 06:28

## 2018-01-01 RX ADMIN — CLOPIDOGREL BISULFATE 75 MG: 75 TABLET ORAL at 08:34

## 2018-01-01 RX ADMIN — SODIUM CHLORIDE 2 G: 9 INJECTION INTRAMUSCULAR; INTRAVENOUS; SUBCUTANEOUS at 01:00

## 2018-01-01 RX ADMIN — MORPHINE SULFATE 1 MG: 2 INJECTION, SOLUTION INTRAMUSCULAR; INTRAVENOUS at 06:35

## 2018-01-01 RX ADMIN — LEVOTHYROXINE SODIUM 75 MCG: 75 TABLET ORAL at 05:59

## 2018-01-01 RX ADMIN — ACETAMINOPHEN 650 MG: 325 TABLET ORAL at 10:27

## 2018-01-01 RX ADMIN — MORPHINE SULFATE 1 MG: 2 INJECTION, SOLUTION INTRAMUSCULAR; INTRAVENOUS at 03:54

## 2018-01-01 RX ADMIN — LEVOTHYROXINE SODIUM 75 MCG: 75 TABLET ORAL at 06:36

## 2018-01-01 RX ADMIN — FAMOTIDINE 20 MG: 10 INJECTION, SOLUTION INTRAVENOUS at 09:17

## 2018-01-01 RX ADMIN — LOSARTAN POTASSIUM 100 MG: 50 TABLET, FILM COATED ORAL at 09:10

## 2018-01-01 RX ADMIN — ASPIRIN 325 MG: 325 TABLET, COATED ORAL at 09:17

## 2018-01-01 RX ADMIN — DOXYCYCLINE 100 MG: 100 INJECTION, POWDER, LYOPHILIZED, FOR SOLUTION INTRAVENOUS at 06:20

## 2018-01-01 RX ADMIN — PANTOPRAZOLE SODIUM 40 MG: 40 TABLET, DELAYED RELEASE ORAL at 12:31

## 2018-01-01 RX ADMIN — FAMOTIDINE 20 MG: 10 INJECTION, SOLUTION INTRAVENOUS at 08:43

## 2018-01-01 RX ADMIN — LOSARTAN POTASSIUM 100 MG: 50 TABLET, FILM COATED ORAL at 08:22

## 2018-01-01 RX ADMIN — MAGNESIUM HYDROXIDE 10 ML: 2400 SUSPENSION ORAL at 17:12

## 2018-01-01 RX ADMIN — KETAMINE HYDROCHLORIDE 10 MG: 100 INJECTION INTRAMUSCULAR; INTRAVENOUS at 15:20

## 2018-01-01 RX ADMIN — ALBUTEROL SULFATE 2.5 MG: 2.5 SOLUTION RESPIRATORY (INHALATION) at 14:16

## 2018-01-01 RX ADMIN — CLOPIDOGREL BISULFATE 75 MG: 75 TABLET ORAL at 09:10

## 2018-01-01 RX ADMIN — Medication: at 09:29

## 2018-01-01 RX ADMIN — CEFTRIAXONE SODIUM 1 G: 1 INJECTION, POWDER, FOR SOLUTION INTRAMUSCULAR; INTRAVENOUS at 19:21

## 2018-01-01 RX ADMIN — PROPOFOL 10 MG: 10 INJECTION, EMULSION INTRAVENOUS at 15:45

## 2018-01-01 RX ADMIN — Medication: at 23:42

## 2018-01-01 RX ADMIN — DOXYCYCLINE 100 MG: 100 INJECTION, POWDER, LYOPHILIZED, FOR SOLUTION INTRAVENOUS at 21:10

## 2018-01-01 RX ADMIN — ACETAMINOPHEN 650 MG: 325 TABLET ORAL at 18:48

## 2018-01-01 RX ADMIN — LORAZEPAM 2 MG: 2 INJECTION INTRAMUSCULAR; INTRAVENOUS at 17:52

## 2018-01-01 RX ADMIN — LOSARTAN POTASSIUM 100 MG: 50 TABLET, FILM COATED ORAL at 09:16

## 2018-01-01 RX ADMIN — CLOPIDOGREL BISULFATE 75 MG: 75 TABLET ORAL at 11:52

## 2018-01-01 RX ADMIN — MORPHINE SULFATE 1 MG: 2 INJECTION, SOLUTION INTRAMUSCULAR; INTRAVENOUS at 19:18

## 2018-01-01 RX ADMIN — LORAZEPAM 0.5 MG: 2 INJECTION INTRAMUSCULAR; INTRAVENOUS at 02:05

## 2018-01-01 RX ADMIN — CEFAZOLIN SODIUM 2 G: 1 INJECTION, POWDER, FOR SOLUTION INTRAMUSCULAR; INTRAVENOUS at 15:44

## 2018-01-01 RX ADMIN — CEFTRIAXONE SODIUM 1 G: 1 INJECTION, POWDER, FOR SOLUTION INTRAMUSCULAR; INTRAVENOUS at 18:07

## 2018-01-01 RX ADMIN — LEVOTHYROXINE SODIUM 75 MCG: 75 TABLET ORAL at 05:53

## 2018-01-01 RX ADMIN — TAZOBACTAM SODIUM AND PIPERACILLIN SODIUM 3.38 G: 375; 3 INJECTION, SOLUTION INTRAVENOUS at 13:07

## 2018-01-01 RX ADMIN — LORAZEPAM 0.5 MG: 2 INJECTION INTRAMUSCULAR; INTRAVENOUS at 06:06

## 2018-01-01 RX ADMIN — FLUTICASONE PROPIONATE 2 SPRAY: 50 SPRAY, METERED NASAL at 09:20

## 2018-01-01 RX ADMIN — LORAZEPAM 1 MG: 2 INJECTION INTRAMUSCULAR; INTRAVENOUS at 11:18

## 2018-01-01 RX ADMIN — DOXYCYCLINE 100 MG: 100 INJECTION, POWDER, LYOPHILIZED, FOR SOLUTION INTRAVENOUS at 06:33

## 2018-01-01 RX ADMIN — ALBUTEROL SULFATE 2.5 MG: 2.5 SOLUTION RESPIRATORY (INHALATION) at 16:24

## 2018-01-01 RX ADMIN — ACETAMINOPHEN 650 MG: 325 TABLET ORAL at 19:09

## 2018-01-01 RX ADMIN — SODIUM CHLORIDE 250 MG: 9 INJECTION, SOLUTION INTRAVENOUS at 11:02

## 2018-01-01 RX ADMIN — ENOXAPARIN SODIUM 70 MG: 80 INJECTION SUBCUTANEOUS at 13:12

## 2018-01-01 RX ADMIN — SODIUM CHLORIDE, SODIUM GLUCONATE, SODIUM ACETATE, POTASSIUM CHLORIDE, AND MAGNESIUM CHLORIDE 75 ML/HR: 526; 502; 368; 37; 30 INJECTION, SOLUTION INTRAVENOUS at 14:39

## 2018-01-01 RX ADMIN — MORPHINE SULFATE 1 MG: 2 INJECTION, SOLUTION INTRAMUSCULAR; INTRAVENOUS at 22:30

## 2018-01-01 RX ADMIN — SODIUM CHLORIDE 75 ML/HR: 9 INJECTION, SOLUTION INTRAVENOUS at 10:27

## 2018-01-01 RX ADMIN — FAMOTIDINE 20 MG: 10 INJECTION, SOLUTION INTRAVENOUS at 08:23

## 2018-01-01 RX ADMIN — SODIUM CHLORIDE 75 ML/HR: 9 INJECTION, SOLUTION INTRAVENOUS at 01:00

## 2018-01-01 RX ADMIN — ACETAMINOPHEN 650 MG: 325 TABLET ORAL at 21:10

## 2018-01-01 RX ADMIN — CLOPIDOGREL BISULFATE 75 MG: 75 TABLET ORAL at 09:16

## 2018-01-01 RX ADMIN — ALBUTEROL SULFATE 2.5 MG: 2.5 SOLUTION RESPIRATORY (INHALATION) at 10:25

## 2018-01-01 RX ADMIN — KETAMINE HYDROCHLORIDE 10 MG: 100 INJECTION INTRAMUSCULAR; INTRAVENOUS at 15:30

## 2018-01-01 RX ADMIN — CEFTRIAXONE SODIUM 1 G: 1 INJECTION, POWDER, FOR SOLUTION INTRAMUSCULAR; INTRAVENOUS at 19:08

## 2018-01-01 RX ADMIN — LOSARTAN POTASSIUM 100 MG: 50 TABLET, FILM COATED ORAL at 08:04

## 2018-01-01 RX ADMIN — AMIODARONE HYDROCHLORIDE 150 MG: 1.5 INJECTION, SOLUTION INTRAVENOUS at 15:50

## 2018-01-01 RX ADMIN — METHYLPREDNISOLONE SODIUM SUCCINATE 125 MG: 125 INJECTION, POWDER, FOR SOLUTION INTRAMUSCULAR; INTRAVENOUS at 13:12

## 2018-01-01 RX ADMIN — SODIUM CHLORIDE 2 G: 9 INJECTION INTRAMUSCULAR; INTRAVENOUS; SUBCUTANEOUS at 08:23

## 2018-01-01 RX ADMIN — DOCUSATE SODIUM 100 MG: 50 LIQUID ORAL at 09:13

## 2018-01-01 RX ADMIN — MORPHINE SULFATE 1 MG: 2 INJECTION, SOLUTION INTRAMUSCULAR; INTRAVENOUS at 10:30

## 2018-01-01 RX ADMIN — LEVOTHYROXINE SODIUM 75 MCG: 75 TABLET ORAL at 06:20

## 2018-01-01 RX ADMIN — LOSARTAN POTASSIUM 100 MG: 50 TABLET, FILM COATED ORAL at 08:34

## 2018-01-01 RX ADMIN — ACETAMINOPHEN 650 MG: 325 TABLET ORAL at 18:12

## 2018-01-01 RX ADMIN — ONDANSETRON 4 MG: 2 INJECTION INTRAMUSCULAR; INTRAVENOUS at 22:30

## 2018-01-01 RX ADMIN — ACETAMINOPHEN 650 MG: 325 TABLET ORAL at 02:36

## 2018-01-01 RX ADMIN — ACETAMINOPHEN 650 MG: 325 TABLET ORAL at 09:10

## 2018-01-01 RX ADMIN — SODIUM CHLORIDE 75 ML/HR: 9 INJECTION, SOLUTION INTRAVENOUS at 15:09

## 2018-01-01 RX ADMIN — FLUTICASONE PROPIONATE 2 SPRAY: 50 SPRAY, METERED NASAL at 08:35

## 2018-01-01 RX ADMIN — ENOXAPARIN SODIUM 30 MG: 30 INJECTION SUBCUTANEOUS at 14:31

## 2018-02-08 PROBLEM — S72.001A HIP FRACTURE, RIGHT, CLOSED, INITIAL ENCOUNTER (HCC): Status: ACTIVE | Noted: 2018-01-01
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
## 2018-02-09 NOTE — PLAN OF CARE
Problem: Patient Care Overview (Adult)  Goal: Plan of Care Review  Outcome: Ongoing (interventions implemented as appropriate)   02/09/18 0409   Coping/Psychosocial Response Interventions   Plan Of Care Reviewed With patient;caregiver   Patient Care Overview   Progress no change   Outcome Evaluation   Outcome Summary/Follow up Plan new admit, vss, pain is controlled with pain medication, low O2 saturation and had to put on 1L nasal cannula; will continue to monitor     Goal: Adult Individualization and Mutuality  Outcome: Ongoing (interventions implemented as appropriate)    Goal: Discharge Needs Assessment  Outcome: Ongoing (interventions implemented as appropriate)      Problem: Fall Risk (Adult)  Goal: Identify Related Risk Factors and Signs and Symptoms  Outcome: Ongoing (interventions implemented as appropriate)    Goal: Absence of Falls  Outcome: Ongoing (interventions implemented as appropriate)      Problem: Orthopaedic Fracture (Adult)  Goal: Signs and Symptoms of Listed Potential Problems Will be Absent or Manageable (Orthopaedic Fracture)  Outcome: Ongoing (interventions implemented as appropriate)

## 2018-02-09 NOTE — PROGRESS NOTES
[facility] Medicine Services  INPATIENT PROGRESS NOTE    Length of Stay: 1  Date of Admission: 2/8/2018  Primary Care Physician: Josseline Lentz MD    Subjective   Chief Complaint/HPI:  This 89-year-old  female experienced a mechanical fall at home resulting in right hip fracture.  Patient is to undergo repair with Dr. Salmon of orthopedics today.  She currently has no complaints of pain and is resting comfortably.    Review of Systems   Constitutional: Negative for chills and fever.   Respiratory: Negative for shortness of breath.    Cardiovascular: Negative for chest pain.   Gastrointestinal: Negative for abdominal pain, nausea and vomiting.   Neurological: Negative for headaches.   Psychiatric/Behavioral: Negative for confusion.      All pertinent negatives and positives are as above. All other systems have been reviewed and are negative unless otherwise stated.     Objective    Temp:  [96.5 °F (35.8 °C)-98.9 °F (37.2 °C)] 98.9 °F (37.2 °C)  Heart Rate:  [] 87  Resp:  [18] 18  BP: (140-166)/(71-78) 140/72    Physical Exam   Constitutional: She is oriented to person, place, and time. No distress.   HENT:   Head: Normocephalic and atraumatic.   Cardiovascular: Normal rate and regular rhythm.    Pulmonary/Chest: Effort normal. No respiratory distress.   Abdominal: Soft. She exhibits no distension. There is no tenderness.   Neurological: She is alert and oriented to person, place, and time.   Skin: Skin is warm and dry. She is not diaphoretic.   Psychiatric: She has a normal mood and affect. Her behavior is normal.       Distal pulses intact, capillary refill within 2 seconds.     Results Review:  I have reviewed the labs, radiology results, and diagnostic studies.    Laboratory Data:     Results from last 7 days  Lab Units 02/09/18  0536 02/08/18 2020   SODIUM mmol/L 138 133*   POTASSIUM mmol/L 4.4 4.1   CHLORIDE mmol/L 106 103   CO2 mmol/L 27.0 23.0    BUN mg/dL 13 14   CREATININE mg/dL 0.86 0.84   GLUCOSE mg/dL 100 87   CALCIUM mg/dL 8.7 9.1   BILIRUBIN mg/dL  --  0.3   ALK PHOS U/L  --  100   ALT (SGPT) U/L  --  36   AST (SGOT) U/L  --  27   ANION GAP mmol/L 5.0 7.0     Estimated Creatinine Clearance: 37.7 mL/min (by C-G formula based on Cr of 0.86).            Results from last 7 days  Lab Units 02/09/18  0536 02/08/18 2020   WBC 10*3/mm3 12.23* 15.72*   HEMOGLOBIN g/dL 9.3* 11.0*   HEMATOCRIT % 27.8* 33.2*   PLATELETS 10*3/mm3 189 236       Results from last 7 days  Lab Units 02/08/18  2126   INR  0.98       Culture Data:   No results found for: BLOODCX  No results found for: URINECX  No results found for: RESPCX  No results found for: WOUNDCX  No results found for: STOOLCX  No components found for: BODYFLD    Radiology Data:   Imaging Results (last 24 hours)     Procedure Component Value Units Date/Time    XR Shoulder 2+ View Right [[id number]] Collected:  02/08/18 1725     Updated:  02/08/18 1752    Narrative:         EXAM:  Radiograph(s), Osseous         REGION:   Shoulder    SIDE:     Right     VIEWS:   3             INDICATION:    fall, complaining of right hip and shoulder pain      COMPARISON:    none              FINDINGS:           No evidence of a fracture or bony malalignment.     No evidence of osteolytic/osteoblastic disease.     Diffuse osteopenia.  .        Impression:       CONCLUSION:           1. No gross evidence of acute osseous pathology.              Note:  if pain or symptoms persist beyond reasonable expectations  and follow-up imaging is anticipated,  cross sectional imaging  (CT and/or MRI) is suggested, as is deemed clinically  appropriate.                      Electronically signed by:  JORGE Napier MD  2/8/2018 5:50 PM  CST Workstation: [id number]    XR Hip With or Without Pelvis 2 - 3 View Right [[id number]] Collected:  02/08/18 1731     Updated:  02/08/18 1754    Narrative:         EXAM:  Radiograph(s), Osseous          REGION:   Pelvis and right hip      VIEWS:   3             INDICATION:    fall, complaining of right hip and shoulder pain      COMPARISON:    1/26/16              FINDINGS:           Diffuse osteopenia.  Suspect the presence of an impacted subcapital fracture without  displacement.  Evaluation the remainder the bony pelvis is unremarkable.  Limited left hip assessment is unremarkable.  .        Impression:       CONCLUSION:           1. Suspected subcapital impacted fracture.  Confirmation with CT suggested.                             Electronically signed by:  JORGE Napier MD  2/8/2018 5:52 PM  CST Workstation: [id number]          I have reviewed the patient current medications.     Assessment/Plan     Hospital Problem List     Hip fracture, right, closed, initial encounter          Plan:  Surgical repair with orthopedics today.  Continue with pain control and proceed as hospital course dictates.                This document has been electronically signed by NEAL Beauchamp on February 9, 2018 10:26 AM

## 2018-02-09 NOTE — PLAN OF CARE
Problem: Patient Care Overview (Adult)  Goal: Plan of Care Review  Outcome: Ongoing (interventions implemented as appropriate)   02/09/18 5472   Coping/Psychosocial Response Interventions   Plan Of Care Reviewed With patient   Patient Care Overview   Progress no change   Outcome Evaluation   Outcome Summary/Follow up Plan vss meets pacu dc criteria        Problem: Perioperative Period (Adult)  Goal: Signs and Symptoms of Listed Potential Problems Will be Absent or Manageable (Perioperative Period)  Outcome: Ongoing (interventions implemented as appropriate)

## 2018-02-09 NOTE — CONSULTS
ORTHOPAEDIC CONSULT     Patient Name:  Isabel Barry  Admit Date:  2/8/2018  Consult Date:  2/9/2018      Referring Provider: Shamar DE JESUS  Reason for Consultation: Right hip fracture    Patient Care Team:  Josseline Lentz MD as PCP - General  Josseline Lentz MD as PCP - Claims Attributed    Chief complaint: Right hip fracture    Subjective .     History of present illness: 89-year-old female with a history of CVA, coronary artery disease with a right hemiplegia who fell on her right hip during a transfer yesterday.  She has several caregivers and is complaining of severe right hip pain that is made worse with any movement or activity that started immediately after her fall.  Review of Systems:  Review of Systems positive for right hip pain, pain with motion, weakness on her right side secondary to stroke, heart attack in the past, GI complaints, the remainder is reviewed and otherwise negative except as listed.  Otherwise complete ROS is negative except as mentioned above.    History:  Allergies   Allergen Reactions   • Benicar [Olmesartan]    • Betadine [Povidone Iodine]    • Erythromycin Ethylsuccinate    • Iodine    • Procardia [Nifedipine]    • Tenex [Guanfacine]        Prior to Admission medications    Medication Sig Start Date End Date Taking? Authorizing Provider   acetaminophen (TYLENOL) 500 MG tablet Take 1,000 mg by mouth 2 (Two) Times a Day.   Yes Historical Provider, MD   ALPRAZolam (XANAX) 0.25 MG tablet Take 1 tablet by mouth 3 (Three) Times a Day As Needed for Anxiety.  Patient taking differently: Take 0.25 mg by mouth At Night As Needed for Anxiety. 9/19/17  Yes Josseline Lentz MD   clopidogrel (PLAVIX) 75 MG tablet Take 1 tablet by mouth Daily. 3/21/17  Yes Josseline Lentz MD   fluticasone (FLONASE) 50 MCG/ACT nasal spray USE 2 SPRAYS INTO EACH NOSTRIL EVERY DAY 9/15/17  Yes Josseline Lentz MD   hydrALAZINE (APRESOLINE) 25 MG tablet TAKE 1 TABLET(S) BY MOUTH 3 TIMES PER DAY  Patient  taking differently: TAKE 1 TABLET(S) BY MOUTH every evening 9/15/17  Yes Josseline Lentz MD   levothyroxine (SYNTHROID, LEVOTHROID) 75 MCG tablet TAKE 1 TABLET BY MOUTH DAILY. 10/13/17  Yes Josseline Lentz MD   losartan (COZAAR) 100 MG tablet TAKE ONE TABLET BY MOUTH DAILY FOR BLOOD PRESSURE 10/13/17  Yes Josseline Lentz MD   melatonin 3 MG tablet Take 3 mg by mouth Every Night. Pt takes 1 (3 mg) + 1 (5 mg) = 8 mg per dose nightly.   Yes Historical Provider, MD   melatonin 5 MG tablet tablet Take 5 mg by mouth Every Night. Pt takes 1 (3 mg) + 1 (5 mg) = 8 mg per dose nightly.   Yes Historical Provider, MD   pantoprazole (PROTONIX) 40 MG EC tablet TAKE 1 TABLET BY MOUTH ONCE DAILY AS NEEDED  Patient taking differently: TAKE 1 TABLET BY MOUTH ONCE DAILY every morning 7/17/17  Yes Josseline Lentz MD   simethicone (MYLICON) 80 MG chewable tablet Chew 80 mg Every Night.   Yes Historical Provider, MD   vitamin B-12 (CYANOCOBALAMIN) 1000 MCG tablet Take 1,000 mcg by mouth Daily.   Yes Historical Provider, MD   Vitamin D, Cholecalciferol, (CHOLECALCIFEROL) 400 UNITS tablet Take 1,000 Units by mouth Daily.   Yes Historical Provider, MD   mupirocin (BACTROBAN) 2 % ointment Apply  topically 3 (Three) Times a Day. 9/19/17   Josseline Lentz MD       Past Medical History:   Diagnosis Date   • Acquired coagulation factor inhibitor disorder    • Acquired hypothyroidism    • Acute bronchitis    • Anxiety state    • Cerebrovascular accident     with mild right weakness   • Coronary arteriosclerosis    • Dizziness and giddiness    • Edema     med related   • Encounter for immunization    • Essential hypertension    • Foot pain    • Gastroesophageal reflux disease    • Hematoma     right foot   • Hemiplegia and hemiparesis following cerebral infarction affecting right dominant side    • Hemoptysis    • Hyperlipidemia    • Hyponatremia    • Impacted cerumen    • Inflamed seborrheic keratosis    • Need for immunization against  influenza    • Need for prophylactic vaccination and inoculation against influenza    • Nonexudative age-related macular degeneration    • Nuclear senile cataract     L>R   • Osteoarthritis    • Osteoporosis    • Pain in right hip    • Plantar fasciitis    • Upper respiratory infection    • Urinary tract infectious disease        Past Surgical History:   Procedure Laterality Date   • ABDOMINAL HERNIA REPAIR     • APPENDECTOMY     • CHOLECYSTECTOMY     • CORONARY ARTERY BYPASS GRAFT      CABG, arterial, two (1)      • OTHER SURGICAL HISTORY  09/27/2013    Inj(s) Tend-Sheath, Ligament, Single 71726 (1)          Social History     Social History   • Marital status:      Spouse name: N/A   • Number of children: N/A   • Years of education: N/A     Occupational History   • Not on file.     Social History Main Topics   • Smoking status: Never Smoker   • Smokeless tobacco: Never Used   • Alcohol use No   • Drug use: Not on file   • Sexual activity: Not on file     Other Topics Concern   • Not on file     Social History Narrative       Family History   Problem Relation Age of Onset   • Cancer Other      breast   • Coronary artery disease Other    • Diabetes Other    • Other Other      thyroid disorder       Objective     Vital Signs   Temp:  [96.5 °F (35.8 °C)-97.8 °F (36.6 °C)] 96.5 °F (35.8 °C)  Heart Rate:  [] 80  Resp:  [18] 18  BP: (144-166)/(71-78) 147/72    Physical Exam:  Physical Exam   HENT:   Head: Normocephalic and atraumatic.   Pulmonary/Chest: Effort normal.   Abdominal: Soft.   Musculoskeletal: She exhibits tenderness.   Neurological: She is alert.   Skin: Skin is warm and dry. No erythema.   Psychiatric: She has a normal mood and affect.   Vitals reviewed.    Musculoskeletal: She is noted have some weakness in her right leg range of motion is not tested because of pain.  Sensory exam is intact feet are warm with good refill.  She can move her toes bilaterally.  No tenderness about the back knee  is not painful  Results Review:    Imaging Results (last 24 hours)     Procedure Component Value Units Date/Time    XR Shoulder 2+ View Right [[id number]] Collected:  02/08/18 1725     Updated:  02/08/18 1752    Narrative:         EXAM:  Radiograph(s), Osseous         REGION:   Shoulder    SIDE:     Right     VIEWS:   3             INDICATION:    fall, complaining of right hip and shoulder pain      COMPARISON:    none              FINDINGS:           No evidence of a fracture or bony malalignment.     No evidence of osteolytic/osteoblastic disease.     Diffuse osteopenia.  .        Impression:       CONCLUSION:           1. No gross evidence of acute osseous pathology.              Note:  if pain or symptoms persist beyond reasonable expectations  and follow-up imaging is anticipated,  cross sectional imaging  (CT and/or MRI) is suggested, as is deemed clinically  appropriate.                      Electronically signed by:  JORGE Napier MD  2/8/2018 5:50 PM  CST Workstation: [id number]    XR Hip With or Without Pelvis 2 - 3 View Right [[id number]] Collected:  02/08/18 1731     Updated:  02/08/18 1754    Narrative:         EXAM:  Radiograph(s), Osseous         REGION:   Pelvis and right hip      VIEWS:   3             INDICATION:    fall, complaining of right hip and shoulder pain      COMPARISON:    1/26/16              FINDINGS:           Diffuse osteopenia.  Suspect the presence of an impacted subcapital fracture without  displacement.  Evaluation the remainder the bony pelvis is unremarkable.  Limited left hip assessment is unremarkable.  .        Impression:       CONCLUSION:           1. Suspected subcapital impacted fracture.  Confirmation with CT suggested.                             Electronically signed by:  JORGE Napier MD  2/8/2018 5:52 PM  CST Workstation: [id number]          Lab Results (last 24 hours)     Procedure Component Value Units Date/Time    Comprehensive Metabolic Panel [[id number]]   (Abnormal) Collected:  02/08/18 2020    Specimen:  Blood Updated:  02/08/18 2041     Glucose 87 mg/dL      BUN 14 mg/dL      Creatinine 0.84 mg/dL      Sodium 133 (L) mmol/L      Potassium 4.1 mmol/L      Chloride 103 mmol/L      CO2 23.0 mmol/L      Calcium 9.1 mg/dL      Total Protein 6.0 (L) g/dL      Albumin 3.40 g/dL      ALT (SGPT) 36 U/L      AST (SGOT) 27 U/L      Alkaline Phosphatase 100 U/L      Total Bilirubin 0.3 mg/dL      eGFR Non African Amer 63 mL/min/1.73      Globulin 2.6 gm/dL      A/G Ratio 1.3 g/dL      BUN/Creatinine Ratio 16.7     Anion Gap 7.0 mmol/L     Narrative:       The MDRD GFR formula is only valid for adults with stable renal function between ages 18 and 70.    CBC Auto Differential [[id number]]  (Abnormal) Collected:  02/08/18 2020    Specimen:  Blood Updated:  02/08/18 2041     WBC 15.72 (H) 10*3/mm3      RBC 3.34 (L) 10*6/mm3      Hemoglobin 11.0 (L) g/dL      Hematocrit 33.2 (L) %      MCV 99.4 (H) fL      MCH 32.9 pg      MCHC 33.1 g/dL      RDW 13.2 %      RDW-SD 48.4 (H) fl      MPV 9.9 fL      Platelets 236 10*3/mm3      Neutrophil % 80.6 (H) %      Lymphocyte % 13.6 %      Monocyte % 4.9 %      Eosinophil % 0.3 %      Basophil % 0.2 %      Immature Grans % 0.4 %      Neutrophils, Absolute 12.67 (H) 10*3/mm3      Lymphocytes, Absolute 2.14 10*3/mm3      Monocytes, Absolute 0.77 10*3/mm3      Eosinophils, Absolute 0.04 10*3/mm3      Basophils, Absolute 0.03 10*3/mm3      Immature Grans, Absolute 0.07 (H) 10*3/mm3      nRBC 0.0 /100 WBC     CBC & Differential [[id number]] Collected:  02/08/18 2020    Specimen:  Blood Updated:  02/08/18 2041    Narrative:       The following orders were created for panel order CBC & Differential.  Procedure                               Abnormality         Status                     ---------                               -----------         ------                     CBC Auto Differential[[id number]]        Abnormal            Final result                  Please view results for these tests on the individual orders.    Protime-INR [[id number]]  (Normal) Collected:  02/08/18 2126    Specimen:  Blood Updated:  02/08/18 2206     Protime 12.9 Seconds      INR 0.98    Narrative:       Therapeutic range for most indications is 2.0-3.0 INR,  or 2.5-3.5 for mechanical heart valves.    aPTT [[id number]]  (Normal) Collected:  02/08/18 2126    Specimen:  Blood Updated:  02/08/18 2206     PTT 26.1 seconds     Narrative:       The recommended Heparin therapeutic range is 68-97 seconds.    Extra Tubes [[id number]] Collected:  02/08/18 2126    Specimen:  Blood from Blood, Venous Line Updated:  02/08/18 2231    Narrative:       The following orders were created for panel order Extra Tubes.  Procedure                               Abnormality         Status                     ---------                               -----------         ------                     Lavender Top[[id number]]                                     Final result               Gold Top - SST[[id number]]                                   Final result                 Please view results for these tests on the individual orders.    Lavender Top [[id number]] Collected:  02/08/18 2126    Specimen:  Blood Updated:  02/08/18 2231     Extra Tube hold for add-on      Auto resulted       Gold Top - SST [[id number]] Collected:  02/08/18 2126    Specimen:  Blood Updated:  02/08/18 2231     Extra Tube Hold for add-ons.      Auto resulted.       Urinalysis With / Culture If Indicated - Urine, Catheter [[id number]]  (Abnormal) Collected:  02/09/18 0018    Specimen:  Urine from Urine, Catheter Updated:  02/09/18 0103     Color, UA Yellow     Appearance, UA Clear     pH, UA 5.5     Specific Gravity, UA 1.020     Glucose, UA Negative     Ketones, UA Negative     Bilirubin, UA Negative     Blood, UA Moderate (2+) (A)     Protein, UA Negative     Leuk Esterase, UA Negative     Nitrite, UA Negative     Urobilinogen, UA 0.2 E.U./dL     Urinalysis, Microscopic Only - Urine, Clean Catch [[id number]]  (Abnormal) Collected:  02/09/18 0018    Specimen:  Urine from Urine, Catheter Updated:  02/09/18 0136     RBC, UA 6-12 (A) /HPF      WBC, UA 0-2 /HPF      Bacteria, UA Trace (A) /HPF      Squamous Epithelial Cells, UA None Seen /HPF      Hyaline Casts, UA 3-6 /LPF      Methodology Manual Light Microscopy    CBC Auto Differential [[id number]]  (Abnormal) Collected:  02/09/18 0536    Specimen:  Blood Updated:  02/09/18 0607     WBC 12.23 (H) 10*3/mm3      RBC 2.87 (L) 10*6/mm3      Hemoglobin 9.3 (L) g/dL      Hematocrit 27.8 (L) %      MCV 96.9 fL      MCH 32.4 pg      MCHC 33.5 g/dL      RDW 12.6 %      RDW-SD 44.2 fl      MPV 10.1 fL      Platelets 189 10*3/mm3      Neutrophil % 81.8 (H) %      Lymphocyte % 11.8 %      Monocyte % 5.9 %      Eosinophil % 0.0 %      Basophil % 0.1 %      Immature Grans % 0.4 %      Neutrophils, Absolute 10.01 (H) 10*3/mm3      Lymphocytes, Absolute 1.44 10*3/mm3      Monocytes, Absolute 0.72 10*3/mm3      Eosinophils, Absolute 0.00 10*3/mm3      Basophils, Absolute 0.01 10*3/mm3      Immature Grans, Absolute 0.05 (H) 10*3/mm3     Basic Metabolic Panel [[id number]]  (Normal) Collected:  02/09/18 0536    Specimen:  Blood Updated:  02/09/18 0611     Glucose 100 mg/dL      BUN 13 mg/dL      Creatinine 0.86 mg/dL      Sodium 138 mmol/L      Potassium 4.4 mmol/L      Chloride 106 mmol/L      CO2 27.0 mmol/L      Calcium 8.7 mg/dL      eGFR Non African Amer 62 mL/min/1.73      BUN/Creatinine Ratio 15.1     Anion Gap 5.0 mmol/L     Narrative:       The MDRD GFR formula is only valid for adults with stable renal function between ages 18 and 70.        X-rays of right hip: I reviewed the patient's x-rays and compared her hip x-ray to a film from January 2016.  Certainly the she has a valgus impacted fracture in the AP and the lateral view present is nondiagnostic for any posterior displacement.         Assessment/Plan     Active  Problems:    Hip fracture, right, closed, initial encounter      Impression: right femoral neck fracture, valgus impacted    Plan: I discussed the patients findings and my recommendations with: Her granddaughter and caregiver.  Her granddaughter is the head nurse on the orthopedic floor.  I went over over the x-rays with her granddaughter as well.    The issue is one of nursing she is she will have trouble mobilizing the bed and has some pain.  Certainly she is at risk with how her medical issues and this can be done under some sedation, ketamine with local anesthetic.  Even if she has some posterior angulation we can likely correct this and still penetrated as I would not put her through a larger operation such as a endoprosthesis and I think the prognosis for her and getting again is likely fairly small.    I have spoken with the family this morning including the goode of  and a conference call.  I've gone over extensive risks and benefits including her medical potential complications loss of motion stroke heart attack and death failure of the procedure need for further surgery.  I spent 10 minutes talking with him on the phone discussing this hemoglobin elected to go ahead and try to pin this which I think will need to do under local with some sedation possibly ketamine and I will talk to anesthesia preoperatively.  We will wait to get appropriate medical clearance prior to this.    Truong Salmon M.D.

## 2018-02-09 NOTE — NURSING NOTE
Paged Dr. Davis regarding patient's PO medications.  Patient has an order for speech evaluation tomorrow.  Due to that patient is NPO until evaluation is completed.  Order received to change protonix and hydralazine to IV.

## 2018-02-09 NOTE — OP NOTE
Procedure(s):  HIP PERCUTANEOUS PINNING  Procedure Note    Isabel Barry  2/8/2018 - 2/9/2018    Pre-op Diagnosis:   Hip fracture, right, closed, initial encounter [S72.001A]    Post-op Diagnosis:     Post-Op Diagnosis Codes:     * Hip fracture, right, closed, initial encounter [S72.001A]    Procedure/CPT® Codes:      Procedure(s):  HIP PERCUTANEOUS PINNING    Surgeon(s):  Truong Salmon MD    Anesthesia: Monitor Anesthesia Care with Regional    Staff:   Circulator: Haleigh Macario RN  Scrub Person: Delmy Rivas  Assistant: Latha Benitez MA    Estimated Blood Loss: minimal     Specimens:                None none      Drains:   Urethral Catheter 02/08/18 2030 16 10 (Active)   Daily Indications < 24 hr post op 2/9/2018  4:29 PM   Securement secured to upper leg with adhesive device 2/9/2018  4:29 PM   Catheter care done Yes 2/8/2018 11:00 PM   Tolerance no signs/symptoms of discomfort 2/9/2018  8:55 AM   Urine Output (mL) 600 2/9/2018  5:00 AM           Findings: Femoral neck fracture valgus impacted.  Significant osteopenia    Complications: None    Dictation: Indications:    89-year-old who has history of stroke coronary coronary artery disease really minimal ambulator unfortunately failed with transfer has an impacted femoral neck fracture throughout discussing with her family the options of treating this with percutaneous pinning release to stabilize this I think the prognosis for his very poor as before.  She has a right hemiplegia cleared from medical standpoint.  Certainly the risks and benefits of any surgery going to do this under a ketamine with local anesthetic to minimize medication complications.  Certainly there is risk of hardware failure or screw cut out nonunion malunion AVN.  This is been discussed in detail with the family as well as her granddaughter had nursing orthopedic floor.    Procedure:    After adequate ketamine was given the patient's was placed operating table and the  Telos table is brought in at the foot of the bed.  The patient had marked hip flexion contractures knee flexion contractures making positioning difficult.  The fractured right leg was placed in the leg moya and really minimal traction with a difficult time positioning the left leg is actually held by an assistant to manipulate it so we could get the C-arm control and for adequate x-rays in 2 planes.    We confirmed there was minimal posterior angulation if any at all and this was truly a valgus impacted fracture.  The hip was prepped and draped in usual sterile fashion and under C-arm control 3 guidepins were placed across the lateral edge of the trochanter up the femoral neck in a parallel fashion into the femoral head.  This bone was very soft and this was actually driven by hand we try to get these within 10 mm of the subchondral bone.  This was confirmed on 2 planes the AP and lateral plane which we used intraoperatively with the C-arm.  The screws were measured and the appropriate length screw was then placed over the cannula.  We did this under careful C-arm control and noted that the screws were advanced slowly had to continually backed out the screw at the same time advancing the cannulated pin.  The screws were parallel on both the AP and lateral view reasonable bites were obtained and the very osteopenic bone.  The percutaneous holes were irrigated) saline solution after confirming the position of hardware and fracture.  Holes were closed with interrupted staples.  Sterile dressings were applied she was taken off the table and to the recovery room in stable condition.    I should note that prior to the procedure I injected the area with 1% plain Xylocaine and afterwards injected the infiltrated the area with half percent Marcaine without epinephrine.  She tolerated procedure well and there were no complications intraoperatively.  Counts were correct end of dictation thank you  Truong Salmon,  MD  02/09/18  4:46 PM

## 2018-02-09 NOTE — PLAN OF CARE
Problem: Patient Care Overview (Adult)  Goal: Plan of Care Review   02/09/18 1009   Coping/Psychosocial Response Interventions   Plan Of Care Reviewed With patient   Patient Care Overview   Progress progress toward functional goals as expected   Outcome Evaluation   Outcome Summary/Follow up Plan Swallow evaluation this date with family/caregivers present. pt requires puree diet and honey thick liquids after surgery.        Problem: Inpatient SLP  Goal: Dysphagia- Patient will safely consume diet as per recommendation with no signs/symptoms of aspiration  Outcome: Ongoing (interventions implemented as appropriate)   02/09/18 1009   Safely Consume Diet   Safely Consume Diet- SLP, Date Established 02/09/18   Safely Consume Diet- SLP, Time to Achieve by discharge   Safely Consume Diet- SLP, Activity Level Patient will improve timing of pharyngeal response for safer, more efficient swallow   Safely Consume Diet- SLP, Date Goal Reviewed 02/09/18

## 2018-02-09 NOTE — ANESTHESIA POSTPROCEDURE EVALUATION
Patient: Isabel Barry    Procedure Summary     Date Anesthesia Start Anesthesia Stop Room / Location    02/09/18 1517 1634  [facility] OR [facility] / [facility]       Procedure Diagnosis Surgeon Provider    HIP PERCUTANEOUS PINNING (Right Hip) Hip fracture, right, closed, initial encounter  (Hip fracture, right, closed, initial encounter [S72.001A]) MD Marvin Rae MD          Anesthesia Type: general  Last vitals  BP   140/55 (02/09/18 1429)   Temp   98.7 °F (37.1 °C) (02/09/18 1429)   Pulse   85 (02/09/18 1429)   Resp   16 (02/09/18 1429)     SpO2   92 % (02/09/18 1429)     Post Anesthesia Care and Evaluation    Patient location during evaluation: PACU  Patient participation: complete - patient participated  Level of consciousness: awake  Pain score: 0  Pain management: adequate  Airway patency: patent  Anesthetic complications: No anesthetic complications  PONV Status: none  Cardiovascular status: acceptable  Respiratory status: acceptable  Hydration status: acceptable

## 2018-02-09 NOTE — H&P
[facility] Medicine Admission      Date of Admission: 2/8/2018      Primary Care Physician: Josseline Lentz MD      Chief Complaint:  Right hip pain    HPI:  This 89-year-old  female was brought to the emergency department by her family after experiencing a mechanical fall that resulted in right hip pain.  Patient denies any other symptoms including chest pain, shortness of air, syncope.  Denies loss of consciousness, denies neck pain, denies hitting her head, denies any headaches or worse headache of life.  Denies thunderclap headache.  Patient does have some residual right-sided weakness from a previous CVA.  At this time she is resting comfortably and denies nausea, vomiting.  No other recent illnesses, denies fever, chills.    Concurrent Medical History:  has a past medical history of Acquired coagulation factor inhibitor disorder; Acquired hypothyroidism; Acute bronchitis; Anxiety state; Cerebrovascular accident; Coronary arteriosclerosis; Dizziness and giddiness; Edema; Encounter for immunization; Essential hypertension; Foot pain; Gastroesophageal reflux disease; Hematoma; Hemiplegia and hemiparesis following cerebral infarction affecting right dominant side; Hemoptysis; Hyperlipidemia; Hyponatremia; Impacted cerumen; Inflamed seborrheic keratosis; Need for immunization against influenza; Need for prophylactic vaccination and inoculation against influenza; Nonexudative age-related macular degeneration; Nuclear senile cataract; Osteoarthritis; Osteoporosis; Pain in right hip; Plantar fasciitis; Upper respiratory infection; and Urinary tract infectious disease.    Past Surgical History:  has a past surgical history that includes Appendectomy; Coronary artery bypass graft; Cholecystectomy; Other surgical history (09/27/2013); and Abdominal hernia repair.    Family History: family history includes Cancer in her other; Coronary artery disease in her other;  Diabetes in her other; Other in her other.     Social History:  reports that she has never smoked. She has never used smokeless tobacco. She reports that she does not drink alcohol.    Allergies:   Allergies   Allergen Reactions   • Benicar [Olmesartan]    • Betadine [Povidone Iodine]    • Erythromycin Ethylsuccinate    • Iodine    • Procardia [Nifedipine]    • Tenex [Guanfacine]        Medications:     Prior to Admission medications    Medication Sig Start Date End Date Taking? Authorizing Provider   acetaminophen (TYLENOL) 500 MG tablet Take 500 mg by mouth Every Night.   Yes Historical Provider, MD   ALPRAZolam (XANAX) 0.25 MG tablet Take 1 tablet by mouth 3 (Three) Times a Day As Needed for Anxiety. 9/19/17  Yes Josseline Lentz MD   clopidogrel (PLAVIX) 75 MG tablet Take 1 tablet by mouth Daily. 3/21/17  Yes Josseline Lentz MD   fluticasone (FLONASE) 50 MCG/ACT nasal spray USE 2 SPRAYS INTO EACH NOSTRIL EVERY DAY 9/15/17  Yes Josseline Lentz MD   hydrALAZINE (APRESOLINE) 25 MG tablet TAKE 1 TABLET(S) BY MOUTH 3 TIMES PER DAY 9/15/17  Yes Josseline Lentz MD   levothyroxine (SYNTHROID, LEVOTHROID) 75 MCG tablet TAKE 1 TABLET BY MOUTH DAILY. 10/13/17  Yes Josseline Lentz MD   losartan (COZAAR) 100 MG tablet TAKE ONE TABLET BY MOUTH DAILY FOR BLOOD PRESSURE 10/13/17  Yes Josseline Lentz MD   pantoprazole (PROTONIX) 40 MG EC tablet TAKE 1 TABLET BY MOUTH ONCE DAILY AS NEEDED 7/17/17  Yes Josseline Lentz MD   simethicone (MYLICON) 80 MG chewable tablet Chew 80 mg Every 6 (Six) Hours As Needed for flatulence.   Yes Historical Provider, MD   vitamin B-12 (CYANOCOBALAMIN) 1000 MCG tablet Take 1,000 mcg by mouth Daily.   Yes Historical Provider, MD   Vitamin D, Cholecalciferol, (CHOLECALCIFEROL) 400 UNITS tablet Take 400 Units by mouth Daily.   Yes Historical Provider, MD   mupirocin (BACTROBAN) 2 % ointment Apply  topically 3 (Three) Times a Day. 9/19/17   Josseline Lentz MD   potassium chloride ER  (K-TAB) 20 MEQ tablet controlled-release ER tablet Take 20 mEq by mouth Daily.    Historical Provider, MD         Review of Systems:  Review of Systems   Constitutional: Negative for chills and fever.   Respiratory: Negative for shortness of breath.    Cardiovascular: Negative for chest pain.   Gastrointestinal: Negative for abdominal pain, nausea and vomiting.   Musculoskeletal: Positive for arthralgias.   Neurological: Negative for dizziness, light-headedness and headaches.   Psychiatric/Behavioral: Negative for confusion.      Otherwise complete ROS is negative except as mentioned above.    Physical Exam:   Temp:  [97.8 °F (36.6 °C)] 97.8 °F (36.6 °C)  Heart Rate:  [86] 86  Resp:  [18] 18  BP: (166)/(71) 166/71  Physical Exam   Constitutional: She is oriented to person, place, and time. No distress.   HENT:   Head: Atraumatic.   Eyes: Conjunctivae are normal. Pupils are equal, round, and reactive to light.   Cardiovascular: Normal rate and regular rhythm.    Pulmonary/Chest: Effort normal. No respiratory distress. She has no wheezes.   Abdominal: Soft. Bowel sounds are normal. She exhibits no distension. There is no tenderness.   Neurological: She is alert and oriented to person, place, and time.   Skin: Skin is warm and dry. She is not diaphoretic.   Psychiatric: She has a normal mood and affect. Her behavior is normal.       Distal pulses intact, capillary refill less than 2 seconds.    Results Reviewed:  I have personally reviewed current lab, radiology, and data and agree with results.  Lab Results (last 24 hours)     ** No results found for the last 24 hours. **        Imaging Results (last 24 hours)     Procedure Component Value Units Date/Time    XR Shoulder 2+ View Right [[id number]] Collected:  02/08/18 1725     Updated:  02/08/18 7232    Narrative:         EXAM:  Radiograph(s), Osseous         REGION:   Shoulder    SIDE:     Right     VIEWS:   3             INDICATION:    fall, complaining of right hip  and shoulder pain      COMPARISON:    none              FINDINGS:           No evidence of a fracture or bony malalignment.     No evidence of osteolytic/osteoblastic disease.     Diffuse osteopenia.  .        Impression:       CONCLUSION:           1. No gross evidence of acute osseous pathology.              Note:  if pain or symptoms persist beyond reasonable expectations  and follow-up imaging is anticipated,  cross sectional imaging  (CT and/or MRI) is suggested, as is deemed clinically  appropriate.                      Electronically signed by:  JORGE Napier MD  2/8/2018 5:50 PM  CST Workstation: [id number]    XR Hip With or Without Pelvis 2 - 3 View Right [[id number]] Collected:  02/08/18 1731     Updated:  02/08/18 2631    Narrative:         EXAM:  Radiograph(s), Osseous         REGION:   Pelvis and right hip      VIEWS:   3             INDICATION:    fall, complaining of right hip and shoulder pain      COMPARISON:    1/26/16              FINDINGS:           Diffuse osteopenia.  Suspect the presence of an impacted subcapital fracture without  displacement.  Evaluation the remainder the bony pelvis is unremarkable.  Limited left hip assessment is unremarkable.  .        Impression:       CONCLUSION:           1. Suspected subcapital impacted fracture.  Confirmation with CT suggested.                             Electronically signed by:  JORGE Napier MD  2/8/2018 5:52 PM  CST Workstation: [id number]            Assessment:    Hospital Problem List     Hip fracture, right, closed, initial encounter              Plan:  Patient has been evaluated by orthopedics, Dr. Salmon.  Patient to undergo surgical repair.  Speech evaluation for specialized diet secondary to known dysphasia.  Pain medication as needed, continue to treat as hospital course dictates.          This document has been electronically signed by NEAL Beauchamp on February 8, 2018 7:17 PM

## 2018-02-09 NOTE — ANESTHESIA PREPROCEDURE EVALUATION
Anesthesia Evaluation     NPO Solid Status: > 8 hours  NPO Liquid Status: > 8 hours           Airway   Mallampati: I  Neck ROM: full  no difficulty expected  Dental    (+) edentulous    Pulmonary     breath sounds clear to auscultation  (+) recent URI,   Cardiovascular     ECG reviewed  PT is on anticoagulation therapy  Rhythm: regular  Rate: normal    (+) hypertension, CAD, CABG, hyperlipidemia      Neuro/Psych  (+) CVA residual symptoms, dizziness/light headedness, psychiatric history Depression,     GI/Hepatic/Renal/Endo    (+)  GERD well controlled, hypothyroidism,     Musculoskeletal     Abdominal     Abdomen: soft.   Substance History      OB/GYN          Other   (+) arthritis                     Anesthesia Plan    ASA 3     general   (Would not perform regional given plavix yesterday night; would prefer general anesthetic with LMA; Probably to sleep on gurney then transfer to OR table.)  intravenous induction   Anesthetic plan and risks discussed with patient.

## 2018-02-09 NOTE — THERAPY EVALUATION
Acute Care - Speech Language Pathology   Swallow Initial Evaluation [facility]     Patient Name: Isabel Barry  : 1924  MRN: [id number]  Today's Date: 2018               Admit Date: 2018  1. Pt to swallow in a timely way with po trials.  2. Pt to tolerate recommended diet for safe and adequate nutrition/hydration: pt is npo for possible surgery this date.  She will require puree diet with honey thick liquids when able.  ST to follow for diet recommendations as needed   Magaly Omer, CCC-SLP 2018 10:15 AM    Visit Dx:     ICD-10-CM ICD-9-CM   1. Closed subcapital fracture of right femur, initial encounter S72.011A 820.09   2. Hip fracture, right, closed, initial encounter S72.001A 820.8   3. Oropharyngeal dysphagia R13.12 787.22     Patient Active Problem List   Diagnosis   • Acquired hypothyroidism   • Cerebrovascular accident   • Coronary arteriosclerosis   • Essential hypertension   • Gastroesophageal reflux disease   • Hyperlipidemia   • Anxiety state   • Hip fracture, right, closed, initial encounter     Past Medical History:   Diagnosis Date   • Acquired coagulation factor inhibitor disorder    • Acquired hypothyroidism    • Acute bronchitis    • Anxiety state    • Cerebrovascular accident     with mild right weakness   • Coronary arteriosclerosis    • Dizziness and giddiness    • Edema     med related   • Encounter for immunization    • Essential hypertension    • Foot pain    • Gastroesophageal reflux disease    • Hematoma     right foot   • Hemiplegia and hemiparesis following cerebral infarction affecting right dominant side    • Hemoptysis    • Hyperlipidemia    • Hyponatremia    • Impacted cerumen    • Inflamed seborrheic keratosis    • Need for immunization against influenza    • Need for prophylactic vaccination and inoculation against influenza    • Nonexudative age-related macular degeneration    • Nuclear senile cataract     L>R   • Osteoarthritis    • Osteoporosis     • Pain in right hip    • Plantar fasciitis    • Upper respiratory infection    • Urinary tract infectious disease      Past Surgical History:   Procedure Laterality Date   • ABDOMINAL HERNIA REPAIR     • APPENDECTOMY     • CHOLECYSTECTOMY     • CORONARY ARTERY BYPASS GRAFT      CABG, arterial, two (1)      • OTHER SURGICAL HISTORY  09/27/2013    Inj(s) Tend-Sheath, Ligament, Single 26579 (1)             SWALLOW EVALUATION (last 72 hours)      Swallow Evaluation       02/09/18 0830                Rehab Evaluation    Document Type evaluation  -EC        Subjective Information complains of;pain  -EC        General Information    Patient Profile Review yes  -EC        Onset of Illness/Injury 02/08/18  -EC        Pertinent History Of Current Problem pt may have surgery.  will need altered diet after surgery  -EC        Current Diet Limitations NPO  -EC        Barriers to Rehab --   prior dysphagia  -EC        Clinical Impression    Patient's Goals For Discharge patient did not state  -EC        Family Goals For Discharge family did not state  -EC        SLP Swallowing Diagnosis moderate dysphagia;severe dysphagia  -EC        Rehab Potential/Prognosis, Swallowing fair, will monitor progress closely  -EC        Criteria for Skilled Therapeutic Interventions Met skilled criteria for dysphagia intervention met  -EC        Therapy Frequency 3-5 times/wk  -EC        Predicted Duration Therapy Interv (days) until discharge  -EC        SLP Diet Recommendation II - pureed;honey-thick liquids  -EC        Monitor For Signs Of Aspiration --   yes  -EC        Pain Assessment    Pain Assessment No/denies pain  -EC        Cognitive Assessment/Intervention    Current Cognitive/Communication Assessment did not assess  -EC        Oral Motor Structure and Function    Oral Motor Anatomy and Physiology patient demonstrates anatomy and physiology that is WNL  -EC        Dentition Assessment missing teeth  -EC        Mucosal Quality  dry;cracked  -EC        Oral Motor Assessment Comment pt on puree diet and Honey thick liquid since CVA at home prior  -EC        General Feeding/Swallowing Observations    Current Feeding Method NPO  -EC        Clinical Swallow Exam    Summary of Clinical Exam pt not able to take po prior to surgery.    -EC        Dysphagia Treatment Objectives and Progress    Dysphagia Treatment Objectives Improve timing of pharyngeal response;Other 1  -EC        Improve timing of pharyngeal response    To improve timing of pharyngeal response, patient will: Swallow in timely way after sensory input;90%  -EC        Status: Improve timing of pharyngeal response New  -EC        Timing of Pharyngeal Response Progress with consistent cues  -EC        Dysphagia Other 1    Dysphagia Other 1 Objective pt will tolerate recommended diet with no s/s of aspiration  -EC        Status: Dysphagia Other 1 New  -EC        Dysphagia Other 1 Progress 90%;with consistent cues  -EC        Comments: Dysphagia Other 1 pt NPO for possible surgery today, but will need altered diet when able.    -EC        Positioning and Restraints    Pre-Treatment Position in bed  -EC        Post Treatment Position bed  -EC        In Bed side lying right;call light within reach;with family/caregiver  -EC          User Key  (r) = Recorded By, (t) = Taken By, (c) = Cosigned By    Initials Name Effective Dates    EC Magaly Omer CCC-SLP 12/30/16 -         EDUCATION  The patient has been educated in the following areas:   Dysphagia (Swallowing Impairment).    SLP Recommendation and Plan  SLP Swallowing Diagnosis: moderate dysphagia, severe dysphagia  SLP Diet Recommendation: II - pureed, honey-thick liquids        Monitor For Signs Of Aspiration:  (yes)     Criteria for Skilled Therapeutic Interventions Met: skilled criteria for dysphagia intervention met     Rehab Potential/Prognosis, Swallowing: fair, will monitor progress closely  Therapy Frequency: 3-5 times/wk              Plan of Care Review  Plan Of Care Reviewed With: patient  Progress: progress toward functional goals as expected  Outcome Summary/Follow up Plan: Swallow evaluation this date with family/caregivers present.  pt requires puree diet and honey thick liquids after surgery.            IP SLP Goals       02/09/18 1009          Safely Consume Diet    Safely Consume Diet- SLP, Date Established 02/09/18  -EC      Safely Consume Diet- SLP, Time to Achieve by discharge  -EC      Safely Consume Diet- SLP, Activity Level Patient will improve timing of pharyngeal response for safer, more efficient swallow  -EC      Safely Consume Diet- SLP, Date Goal Reviewed 02/09/18  -EC        User Key  (r) = Recorded By, (t) = Taken By, (c) = Cosigned By    Initials Name Provider Type    EC MOHSEN Ramirez Speech and Language Pathologist               Time Calculation:         Time Calculation- SLP       02/09/18 1012          Time Calculation- SLP    SLP Start Time 0830  -EC      SLP Stop Time 0900  -EC      SLP Time Calculation (min) 30 min  -EC      Total Timed Code Minutes- SLP 30 minute(s)  -EC      SLP Received On 02/09/18  -EC      SLP Goal Re-Cert Due Date 02/23/18  -EC        User Key  (r) = Recorded By, (t) = Taken By, (c) = Cosigned By    Initials Name Provider Type    EC MARIN RamirezSLP Speech and Language Pathologist          Therapy Charges for Today     Code Description Service Date Service Provider Modifiers Qty    75588278675 HC ST SWALLOWING CURRENT STATUS 2/9/2018 MOHSEN Ramirez GN, CL 1    15840375532 HC ST SWALLOWING PROJECTED 2/9/2018 MOHSEN Ramirez GN, CL 1    [id number] HC ST SWALLOWING DISCHARGE 2/9/2018 MOHSEN Ramirez GN, CK 1    [id number] HC ST EVAL ORAL PHARYNG SWALLOW 2 2/9/2018 MOHSEN Ramirez GN 1          SLP G-Codes  Functional Limitations: Swallowing  Swallow Current Status (): At least 60 percent but less than 80  percent impaired, limited or restricted  Swallow Goal Status (): At least 60 percent but less than 80 percent impaired, limited or restricted  Swallow Discharge Status (): At least 40 percent but less than 60 percent impaired, limited or restricted    Magaly Omer, MC-SLP  2/9/2018

## 2018-02-09 NOTE — PLAN OF CARE
Problem: Patient Care Overview (Adult)  Goal: Plan of Care Review  Outcome: Ongoing (interventions implemented as appropriate)   02/09/18 2698   Coping/Psychosocial Response Interventions   Plan Of Care Reviewed With patient;family   Patient Care Overview   Progress no change   Outcome Evaluation   Outcome Summary/Follow up Plan pt evaluated by SLP, pt complains of pain, monitoring, pt transported to surgery     Goal: Adult Individualization and Mutuality  Outcome: Ongoing (interventions implemented as appropriate)    Goal: Discharge Needs Assessment  Outcome: Ongoing (interventions implemented as appropriate)      Problem: Fall Risk (Adult)  Goal: Identify Related Risk Factors and Signs and Symptoms  Outcome: Ongoing (interventions implemented as appropriate)    Goal: Absence of Falls  Outcome: Ongoing (interventions implemented as appropriate)      Problem: Orthopaedic Fracture (Adult)  Goal: Signs and Symptoms of Listed Potential Problems Will be Absent or Manageable (Orthopaedic Fracture)  Outcome: Ongoing (interventions implemented as appropriate)

## 2018-02-10 NOTE — PROGRESS NOTES
[facility] Medicine Services  INPATIENT PROGRESS NOTE    Length of Stay: 2  Date of Admission: 2/8/2018  Primary Care Physician: Josseline Lentz MD    Subjective   Please note that all previous progress notes, lab findings, radiographical findings, medication changes, and physical exam findings have been noted and updated as appropriate.    2/10/2018: Patient currently resting comfortably.  No complaints.  Low-grade fever overnight, postsurgical.  Has completely resolved.  No nausea or vomiting, no chest pain or shortness of air.  We'll defer to orthopedics for mobility and PT/OT.  Encourage incentive spirometry.    Chief Complaint/HPI:  This 89-year-old  female experienced a mechanical fall at home resulting in right hip fracture.  Patient is to undergo repair with Dr. Salmon of orthopedics today.  She currently has no complaints of pain and is resting comfortably.    Review of Systems   Constitutional: Negative for chills and fever.   Respiratory: Negative for shortness of breath.    Cardiovascular: Negative for chest pain.   Gastrointestinal: Negative for abdominal pain, nausea and vomiting.   Neurological: Negative for headaches.   Psychiatric/Behavioral: Negative for confusion.      All pertinent negatives and positives are as above. All other systems have been reviewed and are negative unless otherwise stated.     Objective    Temp:  [96.2 °F (35.7 °C)-100.7 °F (38.2 °C)] 97.7 °F (36.5 °C)  Heart Rate:  [70-85] 83  Resp:  [14-18] 18  BP: (122-160)/(55-73) 131/63    Physical Exam   Constitutional: She is oriented to person, place, and time. No distress.   HENT:   Head: Normocephalic and atraumatic.   Cardiovascular: Normal rate and regular rhythm.    Pulmonary/Chest: Effort normal. No respiratory distress.   Abdominal: Soft. She exhibits no distension. There is no tenderness.   Neurological: She is alert and oriented to person, place, and time.   Skin:  Skin is warm and dry. She is not diaphoretic.   Psychiatric: She has a normal mood and affect. Her behavior is normal.       Distal pulses intact, capillary refill within 2 seconds.     Results Review:  I have reviewed the labs, radiology results, and diagnostic studies.    Laboratory Data:     Results from last 7 days  Lab Units 02/09/18 0536 02/08/18 2020   SODIUM mmol/L 138 133*   POTASSIUM mmol/L 4.4 4.1   CHLORIDE mmol/L 106 103   CO2 mmol/L 27.0 23.0   BUN mg/dL 13 14   CREATININE mg/dL 0.86 0.84   GLUCOSE mg/dL 100 87   CALCIUM mg/dL 8.7 9.1   BILIRUBIN mg/dL  --  0.3   ALK PHOS U/L  --  100   ALT (SGPT) U/L  --  36   AST (SGOT) U/L  --  27   ANION GAP mmol/L 5.0 7.0     Estimated Creatinine Clearance: 37.7 mL/min (by C-G formula based on Cr of 0.86).            Results from last 7 days  Lab Units 02/09/18 0536 02/08/18 2020   WBC 10*3/mm3 12.23* 15.72*   HEMOGLOBIN g/dL 9.3* 11.0*   HEMATOCRIT % 27.8* 33.2*   PLATELETS 10*3/mm3 189 236       Results from last 7 days  Lab Units 02/08/18  2126   INR  0.98       Culture Data:   No results found for: BLOODCX  No results found for: URINECX  No results found for: RESPCX  No results found for: WOUNDCX  No results found for: STOOLCX  No components found for: BODYFLD    Radiology Data:   Imaging Results (last 24 hours)     Procedure Component Value Units Date/Time    FL C Arm During Surgery [[id number]] Updated:  02/09/18 1633    XR Hip With or Without Pelvis 1 View Right [[id number]] Collected:  02/09/18 1910     Updated:  02/09/18 1954    Narrative:         EXAM:  Radiograph(s), Osseous         REGION:   Hip    SIDE:     Right     VIEWS:   1             INDICATION:    post op, S72.011A Unspecified intracapsular  fracture of right femur, initial encounter for closed fracture  S72.001A Fracture of unspecified part of neck of right femur,  initial encounter for closed fracture R13.12 Dysphagia,  oropharyngeal phase     COMPARISON:    2/8/18 (preop)               FINDINGS:           Status post repair of an impacted subcapital fracture with 3  stabilizing screws in standard position.  .        Impression:       CONCLUSION:           1. Stable postoperative examination.                             Electronically signed by:  JORGE Napier MD  2/9/2018 7:53 PM  CST Workstation: [id number]          I have reviewed the patient current medications.     Assessment/Plan     Hospital Problem List     Hip fracture, right, closed, initial encounter          Plan:  Incentive spirometry, pain control, PT/OT per orthopedics.  Continue as hospital course dictates.              This document has been electronically signed by NEAL Beauchamp on February 10, 2018 10:40 AM

## 2018-02-10 NOTE — THERAPY TREATMENT NOTE
Acute Care - Speech Language Pathology   Swallow Treatment Note [facility]     Patient Name: Isabel Barry  : 1924  MRN: [id number]  Today's Date: 2/10/2018               Admit Date: 2018    Visit Dx:      ICD-10-CM ICD-9-CM   1. Closed subcapital fracture of right femur, initial encounter S72.011A 820.09   2. Hip fracture, right, closed, initial encounter S72.001A 820.8   3. Oropharyngeal dysphagia R13.12 787.22     Patient Active Problem List   Diagnosis   • Acquired hypothyroidism   • Cerebrovascular accident   • Coronary arteriosclerosis   • Essential hypertension   • Gastroesophageal reflux disease   • Hyperlipidemia   • Anxiety state   • Hip fracture, right, closed, initial encounter             Adult Rehabilitation Note       02/10/18 1135 02/10/18 1035       Rehab Assessment/Intervention    Discipline speech language pathologist  -EK      Document Type therapy note (daily note)  -EK --  -EK     Subjective Information no complaints;agree to therapy  -EK --  -EK     Patient Effort, Rehab Treatment good  -EK      Recorded by [EK] MOHSEN Morataya [EK] MOHSEN Morataya     Pain Assessment    Pain Assessment No/denies pain  -EK      Recorded by [EK] MOHSEN Morataya      Cognitive Assessment/Intervention    Current Cognitive/Communication Assessment impaired  -EK      Orientation Status oriented to;person;place  -EK      Follows Commands/Answers Questions 75% of the time  -EK      Recorded by [EK] MOHSEN Morataya      Dysphagia Treatment Objectives and Progress    Dysphagia Treatment Objectives Improve timing of pharyngeal response;Other 1  -EK      Recorded by [EK] MOHSEN Morataya      Improve timing of pharyngeal response    To improve timing of pharyngeal response, patient will: Swallow in timely way after sensory input;90%  -EK      Status: Improve timing of pharyngeal response Progressing  as expected  -EK      Timing of Pharyngeal Response Progress 70%  -EK      Recorded by [EK] MOHSEN Morataya      Dysphagia Other 1    Dysphagia Other 1 Objective pt will tolerate recommended diet with no s/s of aspiration  -EK      Status: Dysphagia Other 1 Progressing as expected  -EK      Dysphagia Other 1 Progress 80%  -EK      Comments: Dysphagia Other 1 Family reports puree diet and HTL however pt is given some breads by family members. SLP discussed would need to trial soft foods before breads would be given here at the hospital.   -EK      Recorded by [EK] MOHSEN Morataya      Positioning and Restraints    Pre-Treatment Position in bed  -EK      Post Treatment Position bed  -EK      In Bed call light within reach;encouraged to call for assist;exit alarm on;with family/caregiver  -EK      Recorded by [EK] MOHSEN Morataya        User Key  (r) = Recorded By, (t) = Taken By, (c) = Cosigned By    Initials Name Effective Dates    EK MARIN MoratayaSLP 04/06/17 -                   IP SLP Goals       02/10/18 1354 02/09/18 1009       Safely Consume Diet    Safely Consume Diet- SLP, Date Established  02/09/18  -EC     Safely Consume Diet- SLP, Time to Achieve  by discharge  -EC     Safely Consume Diet- SLP, Activity Level  Patient will improve timing of pharyngeal response for safer, more efficient swallow  -EC     Safely Consume Diet- SLP, Date Goal Reviewed 02/10/18  -EK 02/09/18  -EC     Safely Consume Diet- SLP, Outcome goal not met  -EK        User Key  (r) = Recorded By, (t) = Taken By, (c) = Cosigned By    Initials Name Provider Type    MARIN PooleSLP Speech and Language Pathologist    MOHSEN Chavis Speech and Language Pathologist          EDUCATION  The patient has been educated in the following areas:   Dysphagia (Swallowing Impairment).    SLP Recommendation and Plan                 Plan  of Care Review  Plan Of Care Reviewed With: patient  Progress: improving  Outcome Summary/Follow up Plan: Pt tolerating puree and HTL at this time. SLP to trial soft solids in a.m. for possible diet upgrade.            Time Calculation:         Time Calculation- SLP       02/10/18 1355          Time Calculation- SLP    SLP Start Time 1135  -EK      SLP Stop Time 1204  -EK      SLP Time Calculation (min) 29 min  -EK      SLP Received On 02/10/18  -EK        User Key  (r) = Recorded By, (t) = Taken By, (c) = Cosigned By    Initials Name Provider Type    MOHSEN Chavis Speech and Language Pathologist          Therapy Charges for Today     Code Description Service Date Service Provider Modifiers Qty    44556082551  ST TREATMENT SWALLOW 2 2/10/2018 MOHSEN Morataya GN 1          SLP G-Codes  Functional Limitations: Swallowing  Swallow Current Status (): At least 60 percent but less than 80 percent impaired, limited or restricted  Swallow Goal Status (): At least 60 percent but less than 80 percent impaired, limited or restricted  Swallow Discharge Status (): At least 40 percent but less than 60 percent impaired, limited or restricted      MOHSEN Morataya  2/10/2018

## 2018-02-10 NOTE — PROGRESS NOTES
ORTHOPEDIC PROGRESS NOTE:    Name:  Isabel Barry  Date:    2/10/2018  Date of admission:  2/8/2018    Post op day:  1 Day Post-Op  Procedure:    Procedure(s) (LRB):  HIP PERCUTANEOUS PINNING (Right)    Subjective: Doing well pain managed primarily with Tylenol, Aler    Vitals:    Vitals:    02/10/18 1300   BP:    Pulse: 72   Resp:    Temp:    SpO2: 94%       Exam: Alert, NV unchanged      ASSESSMENT:  Hospital Problem List     Hip fracture, right, closed, initial encounter        XR: post op look good    PLAN: Stable post op, will order recheck labs in am, need dvt prophylaxis  DC planning      Truong Salmon MD  02/10/18  1:46 PM

## 2018-02-10 NOTE — PLAN OF CARE
Problem: Patient Care Overview (Adult)  Goal: Plan of Care Review  Outcome: Ongoing (interventions implemented as appropriate)   02/10/18 1339   Coping/Psychosocial Response Interventions   Plan Of Care Reviewed With patient;family   Patient Care Overview   Progress no change   Outcome Evaluation   Outcome Summary/Follow up Plan VSS, pt complains of pain, cont. to monitor     Goal: Adult Individualization and Mutuality  Outcome: Ongoing (interventions implemented as appropriate)    Goal: Discharge Needs Assessment  Outcome: Ongoing (interventions implemented as appropriate)      Problem: Fall Risk (Adult)  Goal: Identify Related Risk Factors and Signs and Symptoms  Outcome: Ongoing (interventions implemented as appropriate)    Goal: Absence of Falls  Outcome: Ongoing (interventions implemented as appropriate)      Problem: Orthopaedic Fracture (Adult)  Goal: Signs and Symptoms of Listed Potential Problems Will be Absent or Manageable (Orthopaedic Fracture)  Outcome: Ongoing (interventions implemented as appropriate)

## 2018-02-10 NOTE — PLAN OF CARE
Problem: Patient Care Overview (Adult)  Goal: Plan of Care Review  Outcome: Ongoing (interventions implemented as appropriate)   02/10/18 0436   Coping/Psychosocial Response Interventions   Plan Of Care Reviewed With patient   Patient Care Overview   Progress no change   Outcome Evaluation   Outcome Summary/Follow up Plan Pt rested well between care. VS stable. Pain controlled with tylenol.

## 2018-02-10 NOTE — PLAN OF CARE
Problem: Patient Care Overview (Adult)  Goal: Plan of Care Review  Outcome: Ongoing (interventions implemented as appropriate)   02/10/18 1354   Coping/Psychosocial Response Interventions   Plan Of Care Reviewed With patient   Patient Care Overview   Progress improving   Outcome Evaluation   Outcome Summary/Follow up Plan Pt tolerating puree and HTL at this time. SLP to trial soft solids in a.m. for possible diet upgrade.        Problem: Inpatient SLP  Goal: Dysphagia- Patient will safely consume diet as per recommendation with no signs/symptoms of aspiration  Outcome: Ongoing (interventions implemented as appropriate)   02/09/18 1009 02/10/18 1354   Safely Consume Diet   Safely Consume Diet- SLP, Date Established 02/09/18 --    Safely Consume Diet- SLP, Time to Achieve by discharge --    Safely Consume Diet- SLP, Activity Level Patient will improve timing of pharyngeal response for safer, more efficient swallow --    Safely Consume Diet- SLP, Date Goal Reviewed --  02/10/18   Safely Consume Diet- SLP, Outcome --  goal not met

## 2018-02-11 NOTE — PLAN OF CARE
Problem: Patient Care Overview (Adult)  Goal: Plan of Care Review  Outcome: Ongoing (interventions implemented as appropriate)   02/11/18 1237   Coping/Psychosocial Response Interventions   Plan Of Care Reviewed With patient   Patient Care Overview   Progress improving   Outcome Evaluation   Outcome Summary/Follow up Plan Pt making good progress at this time. Pt tolerating puree and HTL at this time.        Problem: Inpatient SLP  Goal: Dysphagia- Patient will safely consume diet as per recommendation with no signs/symptoms of aspiration  Outcome: Ongoing (interventions implemented as appropriate)   02/09/18 1009 02/10/18 1354 02/11/18 1237   Safely Consume Diet   Safely Consume Diet- SLP, Date Established 02/09/18 --  --    Safely Consume Diet- SLP, Time to Achieve by discharge --  --    Safely Consume Diet- SLP, Activity Level Patient will improve timing of pharyngeal response for safer, more efficient swallow --  --    Safely Consume Diet- SLP, Date Goal Reviewed --  --  02/11/18   Safely Consume Diet- SLP, Outcome --  goal not met --

## 2018-02-11 NOTE — PROGRESS NOTES
ORTHOPEDIC PROGRESS NOTE:    Name:  Isabel Barry  Date:    2/11/2018  Date of admission:  2/8/2018    Post op day:  2 Days Post-Op  Procedure:    Procedure(s) (LRB):  HIP PERCUTANEOUS PINNING (Right)    Subjective: R hip fx, she is feeling better than yesterday only on Tylenol for pain.    Vitals:    Vitals:    02/11/18 1121   BP: 149/65   Pulse: 69   Resp: 18   Temp: 98.2 °F (36.8 °C)   SpO2: 93%       Exam: She is alert and oriented today breathing comfortably.  Cast are negative the wounds look good on the right hip    Postoperative x-rays looked good      ASSESSMENT:  Hospital Problem List     Hip fracture, right, closed, initial encounter            PLAN: Dry dressing daily, I ordered low-dose Lovenox for DVT prophylaxis once a day.  The patient wants to go home.  Discussed with the family to make sure they have enough support to care for her at home.  She can probably go the next day or 2.  Physical therapy for transfers to chair pivoting on her left leg      Truong Salmon MD  02/11/18  2:08 PM

## 2018-02-11 NOTE — THERAPY TREATMENT NOTE
Acute Care - Speech Language Pathology   Swallow Treatment Note [facility]     Patient Name: Isabel Barry  : 1924  MRN: [id number]  Today's Date: 2018               Admit Date: 2018  Rasheeda and SOFIA.   Visit Dx:      ICD-10-CM ICD-9-CM   1. Closed subcapital fracture of right femur, initial encounter S72.011A 820.09   2. Hip fracture, right, closed, initial encounter S72.001A 820.8   3. Oropharyngeal dysphagia R13.12 787.22     Patient Active Problem List   Diagnosis   • Acquired hypothyroidism   • Cerebrovascular accident   • Coronary arteriosclerosis   • Essential hypertension   • Gastroesophageal reflux disease   • Hyperlipidemia   • Anxiety state   • Hip fracture, right, closed, initial encounter             Adult Rehabilitation Note       18 0808 02/10/18 1135 02/10/18 1035    Rehab Assessment/Intervention    Discipline  speech language pathologist  -EK     Document Type  therapy note (daily note)  -EK --  -EK    Subjective Information  no complaints;agree to therapy  -EK --  -EK    Patient Effort, Rehab Treatment  good  -EK     Recorded by  [EK] MOHSEN Moraatya [EK] MOHSEN Morataya    Pain Assessment    Pain Assessment  No/denies pain  -EK     Recorded by  [EK] MOHSEN Morataya     Cognitive Assessment/Intervention    Current Cognitive/Communication Assessment  impaired  -EK     Orientation Status  oriented to;person;place  -EK     Follows Commands/Answers Questions  75% of the time  -EK     Recorded by  [EK] MOHSEN Morataya     Dysphagia Treatment Objectives and Progress    Dysphagia Treatment Objectives Improve timing of pharyngeal response;Other 1  -EK Improve timing of pharyngeal response;Other 1  -EK     Recorded by [EK] MOHSEN Morataya [EK] MOHSEN Morataya     Improve timing of pharyngeal response    To improve timing of pharyngeal response, patient  will: Swallow in timely way after sensory input;90%  -EK Swallow in timely way after sensory input;90%  -EK     Status: Improve timing of pharyngeal response Progressing as expected  -EK Progressing as expected  -EK     Timing of Pharyngeal Response Progress 80%  -EK 70%  -EK     Recorded by [EK] MOHSEN Morataya [EK] MOHSEN Morataya     Dysphagia Other 1    Dysphagia Other 1 Objective pt will tolerate recommended diet with no s/s of aspiration  -EK pt will tolerate recommended diet with no s/s of aspiration  -EK     Status: Dysphagia Other 1 Progressing as expected  -EK Progressing as expected  -EK     Dysphagia Other 1 Progress 80%  -EK 80%  -EK     Comments: Dysphagia Other 1 SLP trialed bread based on pt/family request. Pt will allow bread to soften and then swallow. Pt displays difficulty with soft items and will expectorate from oral cavity.   -EK Family reports puree diet and HTL however pt is given some breads by family members. SLP discussed would need to trial soft foods before breads would be given here at the hospital.   -EK     Recorded by [EK] MOHSEN Morataya [EK] MOHSEN Morataya     Positioning and Restraints    Pre-Treatment Position in bed  -EK in bed  -EK     Post Treatment Position bed  -EK bed  -EK     In Bed call light within reach;encouraged to call for assist;exit alarm on;patient within staff view;with family/caregiver  -EK call light within reach;encouraged to call for assist;exit alarm on;with family/caregiver  -EK     Recorded by [EK] MOHSEN Morataya [EK] MOHSEN Morataya       User Key  (r) = Recorded By, (t) = Taken By, (c) = Cosigned By    Initials Name Effective Dates    EK MOHSEN Morataya 04/06/17 -                   IP SLP Goals       02/11/18 1237 02/10/18 1354 02/09/18 1009    Safely Consume Diet    Safely Consume Diet- SLP, Date  Established   02/09/18  -EC    Safely Consume Diet- SLP, Time to Achieve   by discharge  -EC    Safely Consume Diet- SLP, Activity Level   Patient will improve timing of pharyngeal response for safer, more efficient swallow  -EC    Safely Consume Diet- SLP, Date Goal Reviewed 02/11/18  -EK 02/10/18  -EK 02/09/18  -EC    Safely Consume Diet- SLP, Outcome  goal not met  -EK       User Key  (r) = Recorded By, (t) = Taken By, (c) = Cosigned By    Initials Name Provider Type    MOHSEN Poole Speech and Language Pathologist    MOHSEN Chavis Speech and Language Pathologist          EDUCATION  The patient has been educated in the following areas:   Dysphagia (Swallowing Impairment).    SLP Recommendation and Plan           Plan of Care Review  Plan Of Care Reviewed With: patient  Progress: improving  Outcome Summary/Follow up Plan: Pt making good progress at this time. Pt tolerating puree and HTL at this time.            Time Calculation:         Time Calculation- SLP       02/11/18 1238          Time Calculation- SLP    SLP Start Time 0808  -EK      SLP Stop Time 0846  -EK      SLP Time Calculation (min) 38 min  -EK      SLP Received On 02/11/18  -EK        User Key  (r) = Recorded By, (t) = Taken By, (c) = Cosigned By    Initials Name Provider Type    MOHSEN Chavis Speech and Language Pathologist          Therapy Charges for Today     Code Description Service Date Service Provider Modifiers Qty    56464903382 HC ST TREATMENT SWALLOW 2 2/10/2018 MOHSEN Morataya GN 1    [id number] HC ST TREATMENT SWALLOW 3 2/11/2018 MOHSEN Morataya GN 1          SLP G-Codes  Functional Limitations: Swallowing  Swallow Current Status (): At least 60 percent but less than 80 percent impaired, limited or restricted  Swallow Goal Status (): At least 60 percent but less than 80 percent impaired, limited or restricted  Swallow  Discharge Status (): At least 40 percent but less than 60 percent impaired, limited or restricted      Magaly Deleon CCC-SLP  2/11/2018

## 2018-02-11 NOTE — PLAN OF CARE
Problem: Fall Risk (Adult)  Goal: Identify Related Risk Factors and Signs and Symptoms  Outcome: Ongoing (interventions implemented as appropriate)    Goal: Absence of Falls  Outcome: Ongoing (interventions implemented as appropriate)      Problem: Orthopaedic Fracture (Adult)  Goal: Signs and Symptoms of Listed Potential Problems Will be Absent or Manageable (Orthopaedic Fracture)  Outcome: Ongoing (interventions implemented as appropriate)      Problem: Perioperative Period (Adult)  Goal: Signs and Symptoms of Listed Potential Problems Will be Absent or Manageable (Perioperative Period)  Outcome: Ongoing (interventions implemented as appropriate)

## 2018-02-11 NOTE — PLAN OF CARE
Problem: Patient Care Overview (Adult)  Goal: Plan of Care Review  Outcome: Ongoing (interventions implemented as appropriate)   02/11/18 1456   Coping/Psychosocial Response Interventions   Plan Of Care Reviewed With patient   Patient Care Overview   Progress no change   Outcome Evaluation   Outcome Summary/Follow up Plan Pt TCDB; enc IS; chest x ray; drsg changed; PT ordered; D/C fluids     Goal: Adult Individualization and Mutuality  Outcome: Ongoing (interventions implemented as appropriate)    Goal: Discharge Needs Assessment  Outcome: Ongoing (interventions implemented as appropriate)      Problem: Fall Risk (Adult)  Goal: Identify Related Risk Factors and Signs and Symptoms  Outcome: Ongoing (interventions implemented as appropriate)    Goal: Absence of Falls  Outcome: Ongoing (interventions implemented as appropriate)      Problem: Orthopaedic Fracture (Adult)  Goal: Signs and Symptoms of Listed Potential Problems Will be Absent or Manageable (Orthopaedic Fracture)  Outcome: Ongoing (interventions implemented as appropriate)      Problem: Perioperative Period (Adult)  Goal: Signs and Symptoms of Listed Potential Problems Will be Absent or Manageable (Perioperative Period)  Outcome: Ongoing (interventions implemented as appropriate)

## 2018-02-11 NOTE — PROGRESS NOTES
[facility] Medicine Services  INPATIENT PROGRESS NOTE    Length of Stay: 3  Date of Admission: 2/8/2018  Primary Care Physician: Josseline Lentz MD    Subjective   Please note that all previous progress notes, lab findings, radiographical findings, medication changes, and physical exam findings have been noted and updated as appropriate.    2/11/2018:  Patient has no complaints, good PO intake.  Remains mildly hypoxic on nasal cannula.  Unable to perform correct spirometry due to residual weakness/facial droop from stroke.  No fever, chills, or chest pain.  Daughter wants some type of rehab placement.    2/10/2018: Patient currently resting comfortably.  No complaints.  Low-grade fever overnight, postsurgical.  Has completely resolved.  No nausea or vomiting, no chest pain or shortness of air.  We'll defer to orthopedics for mobility and PT/OT.  Encourage incentive spirometry.    Chief Complaint/HPI:  This 89-year-old  female experienced a mechanical fall at home resulting in right hip fracture.  Patient is to undergo repair with Dr. Salmon of orthopedics today.  She currently has no complaints of pain and is resting comfortably.    Review of Systems   Constitutional: Negative for chills and fever.   Respiratory: Negative for shortness of breath.    Cardiovascular: Negative for chest pain.   Gastrointestinal: Negative for abdominal pain, nausea and vomiting.   Neurological: Negative for headaches.   Psychiatric/Behavioral: Negative for confusion.      All pertinent negatives and positives are as above. All other systems have been reviewed and are negative unless otherwise stated.     Objective    Temp:  [97.3 °F (36.3 °C)-98.1 °F (36.7 °C)] 97.6 °F (36.4 °C)  Heart Rate:  [68-76] 75  Resp:  [18-20] 18  BP: (128-158)/(60-74) 158/74    Physical Exam   Constitutional: She is oriented to person, place, and time. No distress.   HENT:   Head: Normocephalic and  atraumatic.   Cardiovascular: Normal rate and regular rhythm.    Pulmonary/Chest: Effort normal. No respiratory distress. She has rales.   Abdominal: Soft. She exhibits no distension. There is no tenderness.   Neurological: She is alert and oriented to person, place, and time.   Skin: Skin is warm and dry. She is not diaphoretic.   Psychiatric: She has a normal mood and affect. Her behavior is normal.       Distal pulses intact, capillary refill within 2 seconds.     Results Review:  I have reviewed the labs, radiology results, and diagnostic studies.    Laboratory Data:     Results from last 7 days  Lab Units 02/11/18 0538 02/09/18 0536 02/08/18 2020   SODIUM mmol/L 140 138 133*   POTASSIUM mmol/L 3.7 4.4 4.1   CHLORIDE mmol/L 109 106 103   CO2 mmol/L 23.0 27.0 23.0   BUN mg/dL 13 13 14   CREATININE mg/dL 0.81 0.86 0.84   GLUCOSE mg/dL 76 100 87   CALCIUM mg/dL 8.3* 8.7 9.1   BILIRUBIN mg/dL  --   --  0.3   ALK PHOS U/L  --   --  100   ALT (SGPT) U/L  --   --  36   AST (SGOT) U/L  --   --  27   ANION GAP mmol/L 8.0 5.0 7.0     Estimated Creatinine Clearance: 40.1 mL/min (by C-G formula based on Cr of 0.81).            Results from last 7 days  Lab Units 02/11/18 0538 02/09/18 0536 02/08/18 2020   WBC 10*3/mm3 7.39 12.23* 15.72*   HEMOGLOBIN g/dL 8.9* 9.3* 11.0*   HEMATOCRIT % 27.5* 27.8* 33.2*   PLATELETS 10*3/mm3 156 189 236       Results from last 7 days  Lab Units 02/08/18  2126   INR  0.98       Culture Data:   No results found for: BLOODCX  No results found for: URINECX  No results found for: RESPCX  No results found for: WOUNDCX  No results found for: STOOLCX  No components found for: BODYFLD    Radiology Data:   Imaging Results (last 24 hours)     ** No results found for the last 24 hours. **          I have reviewed the patient current medications.     Assessment/Plan     Hospital Problem List     Hip fracture, right, closed, initial encounter          Plan:  Chest xray to r/o development of pneumonia  or fluid overload.  PO intake adequate currently, d/c fluids at this time.  PT/OT, rehab placement, continue as hospital course dictates and per orhto.  Monitor h/h.  Patient currently on lovenox for DVT prophylaxis, will currently defer long-term anticoagulation to ortho.  Coumadin may be most appropriate given age and comorbidities.  Patient on plavix secondary to stroke.              This document has been electronically signed by NEAL Beauchamp on February 11, 2018 10:13 AM

## 2018-02-12 NOTE — NURSING NOTE
Pt has a deep tissue injury noted lefty later side of foot measures 4.0cmx0.5cmx0.2cm area around tissue injury blanches. No open area noted. Continue to offload, and protection. Open to air. NPOA

## 2018-02-12 NOTE — THERAPY TREATMENT NOTE
Acute Care - Physical Therapy Treatment Note  [facility]     Patient Name: Isabel Barry  : 1924  MRN: [id number]  Today's Date: 2018  Onset of Illness/Injury or Date of Surgery Date: 18  Date of Referral to PT: 18  Referring Physician: MIKHAIL Salmon MD.    Admit Date: 2018    Visit Dx:    ICD-10-CM ICD-9-CM   1. Closed subcapital fracture of right femur, initial encounter S72.011A 820.09   2. Hip fracture, right, closed, initial encounter S72.001A 820.8   3. Oropharyngeal dysphagia R13.12 787.22   4. Impaired physical mobility Z74.09 781.99     Patient Active Problem List   Diagnosis   • Acquired hypothyroidism   • Cerebrovascular accident   • Coronary arteriosclerosis   • Essential hypertension   • Gastroesophageal reflux disease   • Hyperlipidemia   • Anxiety state   • Hip fracture, right, closed, initial encounter               Adult Rehabilitation Note       18 1520 18 0808 18 0808    Rehab Assessment/Intervention    Discipline physical therapist  -CZ speech language pathologist  -EK     Document Type therapy note (daily note)  -CZ therapy note (daily note)  -EK     Subjective Information agree to therapy;complains of;fatigue  -CZ no complaints;agree to therapy  -EK     Patient Effort, Rehab Treatment adequate  -CZ good  -EK     Symptoms Noted During/After Treatment fatigue  -CZ fatigue  -EK     Precautions/Limitations  fall precautions  -EK     Equipment Issued to Patient gait belt  -CZ      Recorded by [CZ] Seth Rice, PT [EK] Magaly Deleon CCC-SLP     Vital Signs    Intra SpO2 (%) 90  -CZ      O2 Delivery Intra Treatment supplemental O2  -CZ      Intra Patient Position Sitting  -CZ      Recorded by [CZ] Seth Rice, PT      Pain Assessment    Pain Assessment No/denies pain  -CZ      Recorded by [CZ] Seth Rice, PETERSON      Cognitive Assessment/Intervention    Current Cognitive/Communication Assessment impaired  -CZ impaired  -EK      Orientation Status  oriented to;person  -EK     Follows Commands/Answers Questions  75% of the time  -EK     Personal Safety  moderate impairment  -EK     Recorded by [CZ] Seth Rice, PT [EK] MOHSEN Morataya     Dysphagia Treatment Objectives and Progress    Dysphagia Treatment Objectives  Improve timing of pharyngeal response;Other 1  -EK Improve timing of pharyngeal response;Other 1  -EK    Recorded by  [EK] MOHSEN Morataya [EK] MOHSEN Morataya    Improve timing of pharyngeal response    To improve timing of pharyngeal response, patient will:  Swallow in timely way after sensory input;90%  -EK Swallow in timely way after sensory input;90%  -EK    Status: Improve timing of pharyngeal response  Progressing as expected  -EK Progressing as expected  -EK    Timing of Pharyngeal Response Progress  80%  -EK 80%  -EK    Comments: Improve timing of pharyngeal response  Pt with delayed swallow; pt required cues to double swallow to clear oral residue.   -EK     Recorded by  [EK] MOHSEN Morataya [EK] MOHSEN Morataya    Dysphagia Other 1    Dysphagia Other 1 Objective  pt will tolerate recommended diet with no s/s of aspiration  -EK pt will tolerate recommended diet with no s/s of aspiration  -EK    Status: Dysphagia Other 1  Progressing as expected  -EK Progressing as expected  -EK    Dysphagia Other 1 Progress  80%  -EK 80%  -EK    Comments: Dysphagia Other 1  Pt with mild pocketing of puree and trials of bread. Trials of bread by SLP only due to pocketing.   -EK SLP trialed bread based on pt/family request. Pt will allow bread to soften and then swallow. Pt displays difficulty with soft items and will expectorate from oral cavity.   -EK    Recorded by  [EK] MOHSEN Morataya [EK] MOHSEN Morataya    Bed Mobility, Assessment/Treatment    Bed Mobility, Assistive Device bed  rails;head of bed elevated  -CZ      Bed Mobility, Roll Left, [place] maximum assist (25% patient effort)  -CZ      Bed Mob, Supine to Sit, [place] maximum assist (25% patient effort)  -CZ      Bed Mobility, Comment caregiver and staff performed transfer with SPV from PT.   -CZ      Recorded by [CZ] Seth Rice, PT      Transfer Assessment/Treatment    Transfers, Bed-Chair [place] maximum assist (25% patient effort)  -CZ      Transfer, Comment Transferred toward R to allow patient support on bed rail with stronger LUE.   -CZ      Recorded by [CZ] Seth Rice PT      Positioning and Restraints    Pre-Treatment Position in bed  -CZ in bed  -EK in bed  -EK    Post Treatment Position chair  -CZ bed  -EK bed  -EK    In Bed supine;with family/caregiver  -CZ call light within reach;exit alarm on;with family/caregiver  -EK call light within reach;encouraged to call for assist;exit alarm on;patient within staff view;with family/caregiver  -EK    Recorded by [CZ] Seth Rice, PT [EK] MOHSEN Morataya [EK] MOHSEN Morataya      02/10/18 1135 02/10/18 1035       Rehab Assessment/Intervention    Discipline speech language pathologist  -EK      Document Type therapy note (daily note)  -EK --  -EK     Subjective Information no complaints;agree to therapy  -EK --  -EK     Patient Effort, Rehab Treatment good  -EK      Recorded by [EK] MOHSEN Morataya [EK] MOHSNE Morataya     Pain Assessment    Pain Assessment No/denies pain  -EK      Recorded by [EK] MOHSEN Morataya      Cognitive Assessment/Intervention    Current Cognitive/Communication Assessment impaired  -EK      Orientation Status oriented to;person;place  -EK      Follows Commands/Answers Questions 75% of the time  -EK      Recorded by [EK] MOHSEN Morataya      Dysphagia Treatment Objectives and Progress    Dysphagia  Treatment Objectives Improve timing of pharyngeal response;Other 1  -EK      Recorded by [EK] Magaly Deleon CCC-SLP      Improve timing of pharyngeal response    To improve timing of pharyngeal response, patient will: Swallow in timely way after sensory input;90%  -EK      Status: Improve timing of pharyngeal response Progressing as expected  -EK      Timing of Pharyngeal Response Progress 70%  -EK      Recorded by [EK] Magaly Deleon CCC-SLP      Dysphagia Other 1    Dysphagia Other 1 Objective pt will tolerate recommended diet with no s/s of aspiration  -EK      Status: Dysphagia Other 1 Progressing as expected  -EK      Dysphagia Other 1 Progress 80%  -EK      Comments: Dysphagia Other 1 Family reports puree diet and HTL however pt is given some breads by family members. SLP discussed would need to trial soft foods before breads would be given here at the hospital.   -EK      Recorded by [EK] MOHSEN Morataya      Positioning and Restraints    Pre-Treatment Position in bed  -EK      Post Treatment Position bed  -EK      In Bed call light within reach;encouraged to call for assist;exit alarm on;with family/caregiver  -EK      Recorded by [EK] Magaly Deleon CCC-SLP        User Key  (r) = Recorded By, (t) = Taken By, (c) = Cosigned By    Initials Name Effective Dates    EK MARIN MoratayaSLP 04/06/17 -     CZ Seth Rice, PT 02/17/17 -                 IP PT Goals       02/12/18 1520 02/12/18 0902       Bed Mobility PT STG    Bed Mobility PT STG, Date Established  02/12/18  -CZ     Bed Mobility PT STG, Time to Achieve  2 - 3 days  -CZ     Bed Mobility PT STG, Activity Type  all bed mobility  -CZ     Bed Mobility PT STG, [place] Level  minimum assist (75% patient effort)  -     Transfer Training Goal, Assist Device  bed rails  -     Bed Mobility PT STG, Date Goal Reviewed 02/12/18  -CZ 02/12/18  -CZ     Bed Mobility PT STG,  Outcome goal ongoing  -CZ goal ongoing  -CZ     Transfer Training PT STG    Transfer Training PT STG, Date Established  02/12/18  -CZ     Transfer Training PT STG, Time to Achieve  2 days  -CZ     Transfer Training PT STG, Activity Type  sit to stand/stand to sit  -CZ     Transfer Training PT STG, [place] Level  minimum assist (75% patient effort)  -CZ     Transfer Training PT STG, Additional Goal  maintaing TDWB on R, support from bed rail.   -CZ     Transfer Training PT STG, Date Goal Reviewed 02/12/18  -CZ 02/12/18  -CZ     Transfer Training PT STG, Outcome goal ongoing  -CZ goal ongoing  -CZ     Transfer Training PT LTG    Transfer Training PT LTG, Date Established  02/12/18  -CZ     Transfer Training PT LTG, Activity Type  bed to chair /chair to bed  -CZ     Transfer Training PT LTG, [place] Level  minimum assist (75% patient effort)  -CZ     Transfer Training PT LTG, Additional Goal  Patient's caregivers and family will demonstrate proper technique to assist patient.   -CZ     Transfer Training PT  LTG, Date Goal Reviewed 02/12/18  -CZ 02/12/18  -CZ     Transfer Training PT LTG, Outcome goal partially met  -CZ goal ongoing  -CZ       User Key  (r) = Recorded By, (t) = Taken By, (c) = Cosigned By    Initials Name Provider Type    ALLIE Rice PT Physical Therapist          Physical Therapy Education     Title: PT OT SLP Therapies (Active)     Topic: Physical Therapy (Active)     Point: Mobility training (Active)    Learning Progress Summary    Learner Readiness Method Response Comment Documented by Status   Patient Acceptance E NR Educated patient and caregiver on proper transfer technique and use of gait belt.  Family and caregiver open to training session; they are to alert nurse when they will all be present. CZ 02/12/18 1150 Active   Caregiver Acceptance E NR Educated patient and caregiver on proper transfer technique and use of gait belt.  Family and caregiver open to training session;  they are to alert nurse when they will all be present.  02/12/18 1150 Active               Point: Body mechanics (Done)    Learning Progress Summary    Learner Readiness Method Response Comment Documented by Status   Patient Acceptance E VU Educated caregiver and family on proper transfer technique: Rolling L, supine to sit, sitting EOB, squat pivot to R to recliner; use of gait belt, proper body mechanics and maintaining TDWB on RLE.  02/12/18 1613 Done   Family Acceptance E VU Educated caregiver and family on proper transfer technique: Rolling L, supine to sit, sitting EOB, squat pivot to R to recliner; use of gait belt, proper body mechanics and maintaining TDWB on RLE.  02/12/18 1613 Done   Caregiver Acceptance E VU Educated caregiver and family on proper transfer technique: Rolling L, supine to sit, sitting EOB, squat pivot to R to recliner; use of gait belt, proper body mechanics and maintaining TDWB on RLE.  02/12/18 1613 Done               Point: Precautions (Active)    Learning Progress Summary    Learner Readiness Method Response Comment Documented by Status   Patient Acceptance E NR Educated on TDWB of R LE during transfers.  02/12/18 1151 Active                      User Key     Initials Effective Dates Name Provider Type Discipline     02/17/17 -  Seth Rice, PT Physical Therapist PT                    PT Recommendation and Plan  Anticipated Discharge Disposition: home with 24/7 care, home with home health  PT Frequency: other (see comments) (5-14x/week)  Plan of Care Review  Plan Of Care Reviewed With: patient, caregiver, family  Outcome Summary/Follow up Plan: Afternoon PT session.  Patient's family and caregiver present for family training. Educated them on proper transfer technique, proper use of gait belt, proper body mechanics and maintaining TDWB on RLE.  All questions answered, case managament notified.  Contiue skilled PT.           Outcome Measures       02/12/18 0902           How much help from another person do you currently need...    Turning from your back to your side while in flat bed without using bedrails? 1  -CZ      Moving from lying on back to sitting on the side of a flat bed without bedrails? 1  -CZ      Moving to and from a bed to a chair (including a wheelchair)? 1  -CZ      Standing up from a chair using your arms (e.g., wheelchair, bedside chair)? 1  -CZ      Climbing 3-5 steps with a railing? 1  -CZ      To walk in hospital room? 1  -CZ      AM-PAC 6 Clicks Score 6  -CZ      Functional Assessment    Outcome Measure Options AM-PAC 6 Clicks Basic Mobility (PT)  -CZ        User Key  (r) = Recorded By, (t) = Taken By, (c) = Cosigned By    Initials Name Provider Type    CZ Seth Rice PT Physical Therapist           Time Calculation:         PT Charges       02/12/18 1620 02/12/18 1258       Time Calculation    Start Time 1520  -CZ 0902  -CZ     Stop Time 1550  -CZ 1003  -CZ     Time Calculation (min) 30 min  -CZ 61 min  -CZ     PT Received On 02/12/18  -CZ 02/12/18  -CZ     PT Goal Re-Cert Due Date  02/25/18  -CZ     Time Calculation- PT    Total Timed Code Minutes- PT 30 minute(s)  -CZ 46 minute(s)  -CZ       User Key  (r) = Recorded By, (t) = Taken By, (c) = Cosigned By    Initials Name Provider Type    CZ Seth Rice PT Physical Therapist          Therapy Charges for Today     Code Description Service Date Service Provider Modifiers Qty    35468514258 HC PT CHNG MAIN POS CURRENT 2/12/2018 Seth Rice PT GP, CN 1    05874120244 HC PT CHNG MAIN POS PROJECTED 2/12/2018 Seth Rice PT GP, CM 1    63431209495 HC PT THERAPEUTIC ACT EA 15 MIN 2/12/2018 Seth Rice PT GP 3    64939222828 HC PT EVAL MOD COMPLEXITY 1 2/12/2018 Seth Rice PT GP 1    85312058120 HC PT THERAPEUTIC ACT EA 15 MIN 2/12/2018 Seth Rice PT GP 2          PT G-Codes  PT Professional Judgement Used?: Yes  Outcome Measure Options: AM-PAC 6 Clicks Basic Mobility (PT)  Score:  6  Functional Limitation: Changing and maintaining body position  Changing and Maintaining Body Position Current Status (): 100 percent impaired, limited or restricted  Changing and Maintaining Body Position Goal Status (): At least 80 percent but less than 100 percent impaired, limited or restricted    Seth Rice, PT  2/12/2018

## 2018-02-12 NOTE — THERAPY EVALUATION
Acute Care - Physical Therapy Initial Evaluation  [facility]     Patient Name: Isabel Barry  : 1924  MRN: [id number]  Today's Date: 2018   Onset of Illness/Injury or Date of Surgery Date: 18  Date of Referral to PT: 18  Referring Physician: MIKHAIL Salmon MD.      Admit Date: 2018     Visit Dx:    ICD-10-CM ICD-9-CM   1. Closed subcapital fracture of right femur, initial encounter S72.011A 820.09   2. Hip fracture, right, closed, initial encounter S72.001A 820.8   3. Oropharyngeal dysphagia R13.12 787.22   4. Impaired physical mobility Z74.09 781.99     Patient Active Problem List   Diagnosis   • Acquired hypothyroidism   • Cerebrovascular accident   • Coronary arteriosclerosis   • Essential hypertension   • Gastroesophageal reflux disease   • Hyperlipidemia   • Anxiety state   • Hip fracture, right, closed, initial encounter     Past Medical History:   Diagnosis Date   • Acquired coagulation factor inhibitor disorder    • Acquired hypothyroidism    • Acute bronchitis    • Anxiety state    • Cerebrovascular accident     with mild right weakness   • Coronary arteriosclerosis    • Dizziness and giddiness    • Edema     med related   • Encounter for immunization    • Essential hypertension    • Foot pain    • Gastroesophageal reflux disease    • Hematoma     right foot   • Hemiplegia and hemiparesis following cerebral infarction affecting right dominant side    • Hemoptysis    • Hyperlipidemia    • Hyponatremia    • Impacted cerumen    • Inflamed seborrheic keratosis    • Need for immunization against influenza    • Need for prophylactic vaccination and inoculation against influenza    • Nonexudative age-related macular degeneration    • Nuclear senile cataract     L>R   • Osteoarthritis    • Osteoporosis    • Pain in right hip    • Plantar fasciitis    • Upper respiratory infection    • Urinary tract infectious disease      Past Surgical History:   Procedure Laterality Date   •  ABDOMINAL HERNIA REPAIR     • APPENDECTOMY     • CHOLECYSTECTOMY     • CORONARY ARTERY BYPASS GRAFT      CABG, arterial, two (1)      • HIP PERCUTANEOUS PINNING Right 2/9/2018    Procedure: HIP PERCUTANEOUS PINNING;  Surgeon: Truong Salmon MD;  Location: [facility];  Service:    • OTHER SURGICAL HISTORY  09/27/2013    Inj(s) Tend-Sheath, Ligament, Single 54921 (1)             PT ASSESSMENT (last 72 hours)      PT Evaluation       02/12/18 0902 02/11/18 0808    Rehab Evaluation    Document Type evaluation  -CZ     Subjective Information agree to therapy  -CZ     Patient Effort, Rehab Treatment good  -CZ     Symptoms Noted During/After Treatment fatigue  -CZ     Symptoms Noted Comment Caregiver and daughter present.  -CZ     General Information    Patient Profile Review yes  -CZ     Onset of Illness/Injury or Date of Surgery Date 02/08/18  -CZ     Referring Physician MIKHAIL Salmon MD.  -CZ     General Observations Supine in bed, receiving meds from nurse, alert, cooperative, O2, mckee catheter; MARCELINO hose applied.  -CZ     Pertinent History Of Current Problem To ED after fall at home, R hip fracture: repaired with ORIF, TDWB.(R hemiparesis from previous CVA.)  -CZ     Precautions/Limitations fall precautions;oxygen therapy device and L/min  -CZ     Prior Level of Function independent:;w/c or scooter;min assist:;transfer;bed mobility  -CZ     Equipment Currently Used at Home hospital bed;commode;wheelchair, motorized;wheelchair  -CZ     Plans/Goals Discussed With patient and family  -CZ     Benefits Reviewed patient and family:;improve function;increase independence;increase strength;increase balance  -CZ     Barriers to Rehab previous functional deficit;language barrier  -CZ     Living Environment    Lives With other (see comments)   24/7 caregivers  -CZ     Living Arrangements house  -CZ     Home Accessibility no concerns  -CZ     Living Environment Comment uses scooter during day.  -CZ     Clinical Impression     Date of Referral to PT 02/11/18  -CZ     PT Diagnosis impaired physical mobility  -CZ     Prognosis fair  -CZ     Functional Level At Time Of Evaluation max Ax1-2 all mobility tasks.  -CZ     Criteria for Skilled Therapeutic Interventions Met yes;treatment indicated  -CZ     Pathology/Pathophysiology Noted (Describe Specifically for Each System) musculoskeletal;neuromuscular;pulmonary  -CZ     Impairments Found (describe specific impairments) aerobic capacity/endurance;ergonomics and body mechanics;gait, locomotion, and balance;motor function;muscle performance;ventilation and respiration/gas exchange  -CZ     Rehab Potential fair, will monitor progress closely  -CZ     Predicted Duration of Therapy Intervention (days/wks) 2-4 days  -CZ     Vital Signs    Pre Systolic BP Rehab 158  -CZ     Pre Treatment Diastolic BP 70  -CZ     Pretreatment Heart Rate (beats/min) 70  -CZ     Posttreatment Heart Rate (beats/min) 67  -CZ     Pre SpO2 (%) 81  -CZ     O2 Delivery Pre Treatment room air  -CZ     Intra SpO2 (%) 94  -CZ     O2 Delivery Intra Treatment supplemental O2   3 LPM  -CZ     Post SpO2 (%) 98  -CZ     O2 Delivery Post Treatment supplemental O2  -CZ     Pre Patient Position Supine  -CZ     Post Patient Position Supine  -CZ     Pain Assessment    Pain Assessment 0-10  -CZ     Pain Score 1   indicates discomfort, unable to quantify; not limiting.  -CZ     Post Pain Score 0  -CZ     Pain Type Surgical pain  -CZ     Pain Location Hip  -CZ     Pain Orientation Right  -CZ     Pain Intervention(s) Medication (See MAR);Repositioned;Distraction  -CZ     Vision Assessment/Intervention    Visual Impairment WFL  -CZ     Cognitive Assessment/Intervention    Current Cognitive/Communication Assessment impaired  -CZ     Orientation Status oriented to;person  -CZ     Follows Commands/Answers Questions 75% of the time  -CZ     Personal Safety moderate impairment  -CZ     Personal Safety Interventions fall prevention program  maintained;muscle strengthening facilitated;nonskid shoes/slippers when out of bed  -CZ     ROM (Range of Motion)    General ROM lower extremity range of motion deficits identified  -CZ     General ROM Detail BLEs: hip/knee flexion contractures.  -CZ     MMT (Manual Muscle Testing)    General MMT Assessment lower extremity strength deficits identified  -CZ     General MMT Assessment Detail RLE: 2+/5, LLE: 3-/5  -CZ     Bed Mobility, Assessment/Treatment    Bed Mobility, Assistive Device bed rails;head of bed elevated  -CZ     Bed Mobility, Roll Left, [place] maximum assist (25% patient effort);2 person assist required  -CZ     Bed Mobility, Comment Poor seated balance.   -CZ     Transfer Assessment/Treatment    Transfers, Bed-Chair [place] maximum assist (25% patient effort)  -CZ     Transfers, Chair-Bed [place] maximum assist (25% patient effort)  -CZ     Transfers, Bed-Chair-Bed, Assist Device bed rails  -CZ     Gait Assessment/Treatment    Gait, [place] Level unable to perform  -CZ     Positioning and Restraints    Pre-Treatment Position in bed  -CZ in bed  -EK    Post Treatment Position chair  -CZ bed  -EK    In Bed  call light within reach;encouraged to call for assist;exit alarm on;patient within staff view;with family/caregiver  -EK    In Chair reclined;with family/caregiver;legs elevated  -CZ       02/10/18 1135 02/10/18 1035    Rehab Evaluation    Document Type therapy note (daily note)  -EK --  -EK    Subjective Information no complaints;agree to therapy  -EK --  -EK    Patient Effort, Rehab Treatment good  -EK     Pain Assessment    Pain Assessment No/denies pain  -EK     Cognitive Assessment/Intervention    Current Cognitive/Communication Assessment impaired  -EK     Orientation Status oriented to;person;place  -EK     Follows Commands/Answers Questions 75% of the time  -EK     Positioning and Restraints    Pre-Treatment Position in bed  -EK     Post Treatment Position bed  -EK     In  Bed call light within reach;encouraged to call for assist;exit alarm on;with family/caregiver  -EK       User Key  (r) = Recorded By, (t) = Taken By, (c) = Cosigned By    Initials Name Provider Type    MIKALA Deleon, CCC-SLP Speech and Language Pathologist     Seth Rice, PETERSON Physical Therapist          Physical Therapy Education     Title: PT OT SLP Therapies (Active)     Topic: Physical Therapy (Active)     Point: Mobility training (Active)    Learning Progress Summary    Learner Readiness Method Response Comment Documented by Status   Patient Acceptance E NR Educated patient and caregiver on proper transfer technique and use of gait belt.  Family and caregiver open to training session; they are to alert nurse when they will all be present.  02/12/18 1150 Active   Caregiver Acceptance E NR Educated patient and caregiver on proper transfer technique and use of gait belt.  Family and caregiver open to training session; they are to alert nurse when they will all be present.  02/12/18 1150 Active               Point: Precautions (Active)    Learning Progress Summary    Learner Readiness Method Response Comment Documented by Status   Patient Acceptance E NR Educated on TDWB of R LE during transfers.  02/12/18 1151 Active                      User Key     Initials Effective Dates Name Provider Type Discipline     02/17/17 -  Seth Rice, PETERSON Physical Therapist PT                PT Recommendation and Plan  Anticipated Discharge Disposition: home with 24/7 care, home with home health  PT Frequency: other (see comments) (5-14x/week)  Plan of Care Review  Plan Of Care Reviewed With: patient, caregiver, daughter  Outcome Summary/Follow up Plan: Initial PT evaluation complete.  Patient is alert, cooperative but anxious about transferring.  Patient requires max Ax1-2 for bed mobility, max Ax1 with transfers; maintains TDWB on RLE. Caregiver and  family member present, encouraged them to alert  nurse when they are all present so that PT can eduated them in proper transfer technique. Continue skilled PT. Recommend HHPT upon discharge to further reinforce proper transfer technique.           IP PT Goals       02/12/18 0902          Bed Mobility PT STG    Bed Mobility PT STG, Date Established 02/12/18  -CZ      Bed Mobility PT STG, Time to Achieve 2 - 3 days  -CZ      Bed Mobility PT STG, Activity Type all bed mobility  -CZ      Bed Mobility PT STG, [place] minimum assist (75% patient effort)  -CZ      Transfer Training Goal, Assist Device bed rails  -CZ      Bed Mobility PT STG, Date Goal Reviewed 02/12/18  -CZ      Bed Mobility PT STG, Outcome goal ongoing  -CZ      Transfer Training PT STG    Transfer Training PT STG, Date Established 02/12/18  -CZ      Transfer Training PT STG, Time to Achieve 2 days  -CZ      Transfer Training PT STG, Activity Type sit to stand/stand to sit  -CZ      Transfer Training PT STG, [place] minimum assist (75% patient effort)  -CZ      Transfer Training PT STG, Additional Goal maintaing TDWB on R, support from bed rail.   -CZ      Transfer Training PT STG, Date Goal Reviewed 02/12/18  -CZ      Transfer Training PT STG, Outcome goal ongoing  -CZ      Transfer Training PT LTG    Transfer Training PT LTG, Date Established 02/12/18  -CZ      Transfer Training PT LTG, Activity Type bed to chair /chair to bed  -CZ      Transfer Training PT LTG, [place] minimum assist (75% patient effort)  -CZ      Transfer Training PT LTG, Additional Goal Patient's caregivers and family will demonstrate proper technique to assist patient.   -CZ      Transfer Training PT  LTG, Date Goal Reviewed 02/12/18  -CZ      Transfer Training PT LTG, Outcome goal ongoing  -CZ        User Key  (r) = Recorded By, (t) = Taken By, (c) = Cosigned By    Initials Name Provider Type    ALLIE Rice PT Physical Therapist                Outcome Measures       02/12/18 0902           How much help from another person do you currently need...    Turning from your back to your side while in flat bed without using bedrails? 1  -CZ      Moving from lying on back to sitting on the side of a flat bed without bedrails? 1  -CZ      Moving to and from a bed to a chair (including a wheelchair)? 1  -CZ      Standing up from a chair using your arms (e.g., wheelchair, bedside chair)? 1  -CZ      Climbing 3-5 steps with a railing? 1  -CZ      To walk in hospital room? 1  -CZ      AM-PAC 6 Clicks Score 6  -CZ      Functional Assessment    Outcome Measure Options AM-PAC 6 Clicks Basic Mobility (PT)  -CZ        User Key  (r) = Recorded By, (t) = Taken By, (c) = Cosigned By    Initials Name Provider Type     Seth Rice PT Physical Therapist           Time Calculation:         PT Charges       02/12/18 1258          Time Calculation    Start Time 0902  -CZ      Stop Time 1003  -CZ      Time Calculation (min) 61 min  -CZ      PT Received On 02/12/18  -CZ      PT Goal Re-Cert Due Date 02/25/18  -CZ      Time Calculation- PT    Total Timed Code Minutes- PT 46 minute(s)  -CZ        User Key  (r) = Recorded By, (t) = Taken By, (c) = Cosigned By    Initials Name Provider Type     Seth Rice PT Physical Therapist          Therapy Charges for Today     Code Description Service Date Service Provider Modifiers Qty    82278061318  PT [facility] MAIN POS CURRENT 2/12/2018 Seth Rice PT GP, CN 1    [id number]  PT [facility] MAIN POS PROJECTED 2/12/2018 Seth Rice PT GP, CM 1    [id number]  PT THERAPEUTIC ACT EA 15 MIN 2/12/2018 Seth Rice PT GP 3    [id number]  PT EVAL MOD COMPLEXITY 1 2/12/2018 Seth Rice PT GP 1          PT G-Codes  PT Professional Judgement Used?: Yes  Outcome Measure Options: AM-PAC 6 Clicks Basic Mobility (PT)  Score: 6  Functional Limitation: Changing and maintaining body position  Changing and Maintaining Body Position Current Status (): 100 percent impaired,  limited or restricted  Changing and Maintaining Body Position Goal Status (): At least 80 percent but less than 100 percent impaired, limited or restricted      Seth Rice, PT  2/12/2018

## 2018-02-12 NOTE — THERAPY TREATMENT NOTE
Acute Care - Speech Language Pathology   Swallow Treatment Note [facility]     Patient Name: Isabel Barry  : 1924  MRN: [id number]  Today's Date: 2018  Onset of Illness/Injury or Date of Surgery Date: 18            Admit Date: 2018    Visit Dx:      ICD-10-CM ICD-9-CM   1. Closed subcapital fracture of right femur, initial encounter S72.011A 820.09   2. Hip fracture, right, closed, initial encounter S72.001A 820.8   3. Oropharyngeal dysphagia R13.12 787.22   4. Impaired physical mobility Z74.09 781.99     Patient Active Problem List   Diagnosis   • Acquired hypothyroidism   • Cerebrovascular accident   • Coronary arteriosclerosis   • Essential hypertension   • Gastroesophageal reflux disease   • Hyperlipidemia   • Anxiety state   • Hip fracture, right, closed, initial encounter             Adult Rehabilitation Note       18 0808 18 0808 02/10/18 1135    Rehab Assessment/Intervention    Discipline speech language pathologist  -EK  speech language pathologist  -EK    Document Type therapy note (daily note)  -EK  therapy note (daily note)  -EK    Subjective Information no complaints;agree to therapy  -EK  no complaints;agree to therapy  -EK    Patient Effort, Rehab Treatment good  -EK  good  -EK    Symptoms Noted During/After Treatment fatigue  -EK      Precautions/Limitations fall precautions  -EK      Recorded by [EK] Magaly Deleon [facility]-SLP  [EK] Magaly Deleon CCC-SLP    Pain Assessment    Pain Assessment   No/denies pain  -EK    Recorded by   [EK] Magaly Deleon CCC-SLP    Cognitive Assessment/Intervention    Current Cognitive/Communication Assessment impaired  -EK  impaired  -EK    Orientation Status oriented to;person  -EK  oriented to;person;place  -EK    Follows Commands/Answers Questions 75% of the time  -EK  75% of the time  -EK    Personal Safety moderate impairment  -EK      Recorded by [EK] Magaly Deleon,  CCC-SLP  [EK] Magaly Deleon [facility]    Dysphagia Treatment Objectives and Progress    Dysphagia Treatment Objectives Improve timing of pharyngeal response;Other 1  -EK Improve timing of pharyngeal response;Other 1  -EK Improve timing of pharyngeal response;Other 1  -EK    [facility]ed [facility]EK] [facility] Magaly Deleon [facility] [EK] Magaly Deleon CCC-SLP    Improve timing of pharyngeal response    To improve timing of pharyngeal response, patient will: Swallow in timely way after sensory input;90%  -EK Swallow in timely way after sensory input;90%  -EK Swallow in timely way after sensory input;90%  -EK    Status: Improve timing of pharyngeal response Progressing as expected  -EK Progressing as expected  -EK Progressing as expected  -EK    Timing of Pharyngeal Response Progress 80%  -EK 80%  -EK 70%  -EK    Comments: Improve timing of pharyngeal response Pt with delayed swallow; pt required cues to double swallow to clear oral residue.   -EK      Recorded by [EK] Magaly Deleon CCC-STEPHAN [EK] Magaly Deleon [facility] [EK] Magaly Deleon [facility]    Dysphagia Other 1    Dysphagia Other 1 Objective pt will tolerate recommended diet with no s/s of aspiration  -EK pt will tolerate recommended diet with no s/s of aspiration  -EK pt will tolerate recommended diet with no s/s of aspiration  -EK    Status: Dysphagia Other 1 Progressing as expected  -EK Progressing as expected  -EK Progressing as expected  -EK    Dysphagia Other 1 Progress 80%  -EK 80%  -EK 80%  -EK    Comments: Dysphagia Other 1 Pt with mild pocketing of puree and trials of bread. Trials of bread by SLP only due to pocketing.   -EK SLP trialed bread based on pt/family request. Pt will allow bread to soften and then swallow. Pt displays difficulty with soft items and will expectorate from oral cavity.   -EK Family reports puree diet and HTL however pt is  given some breads by family members. SLP discussed would need to trial soft foods before breads would be given here at the hospital.   -EK    Recorded by [EK] MOHSEN Morataya [EK] MOHSEN Morataya [EK] MOHSEN Morataya    Positioning and Restraints    Pre-Treatment Position in bed  -EK in bed  -EK in bed  -EK    Post Treatment Position bed  -EK bed  -EK bed  -EK    In Bed call light within reach;exit alarm on;with family/caregiver  -EK call light within reach;encouraged to call for assist;exit alarm on;patient within staff view;with family/caregiver  -EK call light within reach;encouraged to call for assist;exit alarm on;with family/caregiver  -EK    Recorded by [EK] MOHSEN Morataya [EK] MOHSEN Morataya [EK] MOHSEN Morataya      02/10/18 1035          Rehab Assessment/Intervention    Document Type --  -EK      Subjective Information --  -EK      Recorded by [EK] MOHSEN Morataya        User Key  (r) = Recorded By, (t) = Taken By, (c) = Cosigned By    Initials Name Effective Dates    EK MARIN MoratayaSLP 04/06/17 -                   IP SLP Goals       02/12/18 1430 02/11/18 1237 02/10/18 1354    Safely Consume Diet    Safely Consume Diet- SLP, Date Goal Reviewed 02/12/18  -EK 02/11/18  -EK 02/10/18  -EK    Safely Consume Diet- SLP, Outcome   goal not met  -EK      02/09/18 1009          Safely Consume Diet    Safely Consume Diet- SLP, Date Established 02/09/18  -EC      Safely Consume Diet- SLP, Time to Achieve by discharge  -EC      Safely Consume Diet- SLP, Activity Level Patient will improve timing of pharyngeal response for safer, more efficient swallow  -EC      Safely Consume Diet- SLP, Date Goal Reviewed 02/09/18  -EC        User Key  (r) = Recorded By, (t) = Taken By, (c) = Cosigned By    Initials Name Provider Type    EC MOHSEN Ramirez  Speech and Language Pathologist    MOHSEN Chavis Speech and Language Pathologist          EDUCATION  The patient has been educated in the following areas:   Dysphagia (Swallowing Impairment).    SLP Recommendation and Plan                 Plan of Care Review  Plan Of Care Reviewed With: patient  Progress: improving  Outcome Summary/Follow up Plan: Swallow tx completed; pt to continue puree and honey thick liquids.            Time Calculation:         Time Calculation- SLP       02/12/18 1437          Time Calculation- SLP    SLP Start Time 0808  -EK      SLP Stop Time 0901  -EK      SLP Time Calculation (min) 53 min  -EK      SLP Received On 02/12/18  -        User Key  (r) = Recorded By, (t) = Taken By, (c) = Cosigned By    Initials Name Provider Type    MOHSEN Chavis Speech and Language Pathologist          Therapy Charges for Today     Code Description Service Date Service Provider Modifiers Qty    [id number] HC ST TREATMENT SWALLOW 3 2/11/2018 MOHSEN Morataya GN 1    [id number] HC ST TREATMENT SWALLOW 4 2/12/2018 MOHSEN Morataya GN 1          SLP G-Codes  Functional Limitations: Swallowing  Swallow Current Status (): At least 60 percent but less than 80 percent impaired, limited or restricted  Swallow Goal Status (): At least 60 percent but less than 80 percent impaired, limited or restricted  Swallow Discharge Status (): At least 40 percent but less than 60 percent impaired, limited or restricted      MOHSEN Morataya  2/12/2018

## 2018-02-12 NOTE — SIGNIFICANT NOTE
02/12/18 1326   Rehab Treatment   Discipline occupational therapist   Rehab Evaluation   Evaluation Not Performed other (see comments)   OT went to eval pt after checking with RN, Pt asleep and looked very tired would open her eyes then close, unable to get pt to awake enough for an evaluation, pt shook her head when asked if she could awake and engage. RN notified and reported she sat up for awhile today. OT will check back at a later time.

## 2018-02-12 NOTE — PROGRESS NOTES
[facility] Medicine Services  INPATIENT PROGRESS NOTE    Length of Stay: 4  Date of Admission: 2/8/2018  Primary Care Physician: Josseline Lentz MD    Subjective   Please note that all previous progress notes, lab findings, radiographical findings, medication changes, and physical exam findings have been noted and updated as appropriate.    2/12/2018: Chest x-ray demonstrates a subtle pneumonia.  Patient has been started on doxycycline and ceftriaxone.  Patient continues to be hypoxic, does much better on oxygen.  She did remove her oxygen earlier today and dropped into the 80s.  Patient and family was educated.  Patient states she is in no pain and feels much better.  Case management will see today to begin planning for discharge and rehabilitation.  Lovenox has been ordered as hospital DVT prophylaxis.  Will discuss with orthopedics the risk versus benefit of long-term anticoagulation with Coumadin.    Patient is also demonstrated an incidental finding of a gram-negative bacilli urinary tract infection.  This will likely be covered by ceftriaxone give it is most likely an Escherichia coli infection.  We will continue to monitor cultures and sensitivity.    2/11/2018:  Patient has no complaints, good PO intake.  Remains mildly hypoxic on nasal cannula.  Unable to perform correct spirometry due to residual weakness/facial droop from stroke.  No fever, chills, or chest pain.  Daughter wants some type of rehab placement.    2/10/2018: Patient currently resting comfortably.  No complaints.  Low-grade fever overnight, postsurgical.  Has completely resolved.  No nausea or vomiting, no chest pain or shortness of air.  We'll defer to orthopedics for mobility and PT/OT.  Encourage incentive spirometry.    Chief Complaint/HPI:  This 89-year-old  female experienced a mechanical fall at home resulting in right hip fracture.  Patient is to undergo repair with Dr. Salmon  of orthopedics today.  She currently has no complaints of pain and is resting comfortably.    Review of Systems   Constitutional: Negative for chills and fever.   Respiratory: Negative for shortness of breath.    Cardiovascular: Negative for chest pain.   Gastrointestinal: Negative for abdominal pain, nausea and vomiting.   Neurological: Negative for headaches.   Psychiatric/Behavioral: Negative for confusion.      All pertinent negatives and positives are as above. All other systems have been reviewed and are negative unless otherwise stated.     Objective    Temp:  [97.7 °F (36.5 °C)-98.2 °F (36.8 °C)] 97.7 °F (36.5 °C)  Heart Rate:  [67-71] 70  Resp:  [18] 18  BP: (131-158)/(65-87) 158/70    Physical Exam   Constitutional: She is oriented to person, place, and time. No distress.   HENT:   Head: Normocephalic and atraumatic.   Cardiovascular: Normal rate and regular rhythm.    Pulmonary/Chest: Effort normal. No respiratory distress. She has rales.   Abdominal: Soft. She exhibits no distension. There is no tenderness.   Neurological: She is alert and oriented to person, place, and time.   Skin: Skin is warm and dry. She is not diaphoretic.   Psychiatric: She has a normal mood and affect. Her behavior is normal.       Distal pulses intact, capillary refill within 2 seconds.     Results Review:  I have reviewed the labs, radiology results, and diagnostic studies.    Laboratory Data:     Results from last 7 days  Lab Units 02/12/18  0611 02/11/18  0538 02/09/18  0536 02/08/18  2020   SODIUM mmol/L 141 140 138 133*   POTASSIUM mmol/L 3.6 3.7 4.4 4.1   CHLORIDE mmol/L 110 109 106 103   CO2 mmol/L 24.0 23.0 27.0 23.0   BUN mg/dL 12 13 13 14   CREATININE mg/dL 0.76 0.81 0.86 0.84   GLUCOSE mg/dL 78 76 100 87   CALCIUM mg/dL 8.1* 8.3* 8.7 9.1   BILIRUBIN mg/dL 0.2  --   --  0.3   ALK PHOS U/L 76  --   --  100   ALT (SGPT) U/L 24  --   --  36   AST (SGOT) U/L 23  --   --  27   ANION GAP mmol/L 7.0 8.0 5.0 7.0     Estimated  Creatinine Clearance: 42.1 mL/min (by C-G formula based on Cr of 0.76).            Results from last 7 days  Lab Units 02/12/18  0611 02/11/18  0538 02/09/18  0536 02/08/18 2020   WBC 10*3/mm3 7.93 7.39 12.23* 15.72*   HEMOGLOBIN g/dL 8.4* 8.9* 9.3* 11.0*   HEMATOCRIT % 25.6* 27.5* 27.8* 33.2*   PLATELETS 10*3/mm3 174 156 189 236       Results from last 7 days  Lab Units 02/08/18  2126   INR  0.98       Culture Data:   No results found for: BLOODCX  Urine Culture   Date Value Ref Range Status   02/11/2018 >100,000 CFU/mL Gram Negative Bacilli (A)  Preliminary   02/11/2018 >100,000 CFU/mL Gram Negative Bacilli (A)  Preliminary     No results found for: RESPCX  No results found for: WOUNDCX  No results found for: STOOLCX  No components found for: BODYFLD    Radiology Data:   Imaging Results (last 24 hours)     Procedure Component Value Units Date/Time    XR Chest 1 View [[id number]] Collected:  02/11/18 1210     Updated:  02/11/18 1245    Narrative:         EXAM:         Radiograph(s), Chest   VIEWS:   Portable ; 1       DATE/TIME:  2/11/2018 12:43 PM CST                INDICATION:   Hypoxia, S72.011A Unspecified intracapsular  fracture of right femur, initial encounter for closed fracture  S72.001A Fracture of unspecified part of neck of right femur,  initial encounter for closed fracture R13.12 Dysphagia,  oropharyngeal phase    COMPARISON:  CXR: 3/8/14             FINDINGS:             - lines/tubes:    none     - cardiac:         size within normal limits         - mediastinum: contour within normal limits         - lungs:         Subtle focal airspace changes in the right base.    The left lung is clear.          - pleura:         Small amount of pleural fluid on the right.                   - osseous:         Status post median sternotomy                  - misc.:         Impression:       CONCLUSION:        1. Subtle focal airspace process in the right lower lung.                                                 Electronically signed by:  JORGE Napier MD  2/11/2018 12:44  PM CST Workstation: [id number]    FL C Arm During Surgery [[id number]] Resulted:  02/12/18 0757     Updated:  02/12/18 0757          I have reviewed the patient current medications.     Assessment/Plan     Hospital Problem List     Hip fracture, right, closed, initial encounter          Plan:   Monitor hemoglobin and hematocrit, continue antibiotics for pneumonia and urinary tract infection, continue physical therapy and occupational therapy, orthopedic consult appreciated.              This document has been electronically signed by NEAL Beauchamp on February 12, 2018 9:39 AM

## 2018-02-12 NOTE — CONSULTS
Adult Nutrition  Assessment    Patient Name:  Isabel Barry  YOB: 1924  MRN: [id number]  Admit Date:  2/8/2018    Assessment Date:  2/12/2018    Comments:  Pt is s/p surgery for hip fx that she sustained from a fall.  Pt with a hx of a CVA with dysphagia and the need for mechanically altered diet.  SLP has seen and evaluated pt and a purred diet with honey think liquids has been ordered.  Overall intake for the last 5 documented meals has been noted to be ~45% average.  Family reports good po intake pta but a wt loss of ~20# in the last 6 months, unsure of exact etiology.  Pressure ulcer identified further increases her nutritional needs.   She is having some constipation--staff aware.  Will add magic cups to all trays and remove ADA restrictions because there is no noted hx of DM.  Rd will monitor          Reason for Assessment       02/12/18 1428    Reason for Assessment    Reason For Assessment/Visit identified at risk by screening criteria    Identified At Risk By Screening Criteria large or nonhealing wound, burn or pressure ulcer;diagnosis    Ortho Fracture                Nutrition/Diet History       02/12/18 1428    Nutrition/Diet History    Typical Food/Fluid Intake Pt currently sitting up in a chair.  Her daughter and her sitter are present.  They report that pt eats well at home.  but she typically eats only 2 meals a day and then a 3PM snack.  NO supper because she feels that it effects her sleeing.  She has lost ~20# over the laste 6 months.  she does need  pureed foods and thickened liquids              Labs/Tests/Procedures/Meds       02/12/18 1430    Labs/Tests/Procedures/Meds    Labs/Tests Review Reviewed;Alb    Medication Review Reviewed, pertinent            Physical Findings       02/12/18 1430    Physical Findings/Assessment    Additional Documentation Physical Appearance (Group)    Physical Appearance    Skin pressure ulcer(s)            Estimated/Assessed Needs       02/12/18  "1432    Calculation Measurements    Weight Used For Calculations 58.5 kg (129 lb)    Height Used for Calculations 1.6 m (5' 3\")    Estimated/Assessed Energy Needs    Energy Need Method [facility]    Age 89    RMR ([facility] Equation) 959.26    Activity Factors ([facility])  Out of bed, ambulatory  1.3    Total estimated needs ([facility]) ~1250    Estimated/Assessed Protein Needs    Weight Used for Protein Calculation 58.5 kg (129 lb)    Protein (gm/kg) 1.2    1.2 Gm Protein (gm) 70.22    Estimated Protein Range 59--70    Estimated/Assessed Fluid Needs    Fluid Need Method Other (comment)   1500cc/24 hours            Nutrition Prescription Ordered       02/12/18 1434    Nutrition Prescription PO    Current PO Diet Pureed    Fluid Consistency Honey thick    Common Modifiers Consistent Carbohydrate            Evaluation of Received Nutrient/Fluid Intake       02/12/18 1436    PO Evaluation    Number of Days PO Intake Evaluated 2 days    Number of Meals 5    % PO Intake ~45%            Electronically signed by:  Bernadette Jaramillo RD  02/12/18 2:43 PM  "

## 2018-02-12 NOTE — DISCHARGE PLACEMENT REQUEST
"Isabel Fuentes (89 y.o. Female)     Date of Birth Social Security Number Address Home Phone MRN    02/25/1924  [id number]   [place] [address] [phone] [id number]    Faith Marital Status          Spiritism        Admission Date Admission Type Admitting Provider Attending Provider Department, Room/Bed    2/8/18 Emergency Bang Granger MD Williams, Kevin L, MD [address], 361/1    Discharge Date Discharge Disposition Discharge Destination                      Attending Provider: Bang Granger MD     Allergies:  Benicar [Olmesartan], Betadine [Povidone Iodine], Erythromycin Ethylsuccinate, Iodine, Procardia [Nifedipine], Tenex [Guanfacine]    Isolation:  None   Infection:  None   Code Status:  Conditional    Ht:  160 cm (63\")   Wt:  73.1 kg (161 lb 1.6 oz)    Admission Cmt:  None   Principal Problem:  None                Active Insurance as of 2/8/2018     Primary Coverage     Payor Plan Insurance Group Employer/Plan Group    MEDICARE MEDICARE A & B      Payor Plan Address Payor Plan Phone Number Effective From Effective To    [address] [phone] 2[birth year]1989     [place] [address]       Subscriber Name Subscriber Birth Date Member ID       ISABEL FUENTES 2/25/1924 [id number]           Secondary Coverage     Payor Plan Insurance Group Employer/Plan Group    [facility] SUPP [id number]     Payor Plan Address Payor Plan Phone Number Effective From Effective To    [address]  6/1/2003     [place] [address]       Subscriber Name Subscriber Birth Date Member ID       ISABEL FUENTES 2/25/1924 [id number]                 Emergency Contacts      (Rel.) Home Phone Work Phone Mobile Phone    Sushila Kelly (Daughter) [phone] -- [phone]               History & Physical      NEAL Beauchamp at 2/8/2018  7:17 PM     Attestation signed by Bang Granger MD at 2/12/2018 12:42 PM        I personally evaluated and " examined the patient in conjunction with Shamar Prabhakar PA-C and agree with the assessment, treatment plan, and disposition of the patient as recorded.  Lungs clear                                     [facility] Admission      Date of Admission: 2/8/2018      Primary Care Physician: Josseline Lentz MD      Chief Complaint:  Right hip pain    HPI:  This 89-year-old  female was brought to the emergency department by her family after experiencing a mechanical fall that resulted in right hip pain.  Patient denies any other symptoms including chest pain, shortness of air, syncope.  Denies loss of consciousness, denies neck pain, denies hitting her head, denies any headaches or worse headache of life.  Denies thunderclap headache.  Patient does have some residual right-sided weakness from a previous CVA.  At this time she is resting comfortably and denies nausea, vomiting.  No other recent illnesses, denies fever, chills.    Concurrent Medical History:  has a past medical history of Acquired coagulation factor inhibitor disorder; Acquired hypothyroidism; Acute bronchitis; Anxiety state; Cerebrovascular accident; Coronary arteriosclerosis; Dizziness and giddiness; Edema; Encounter for immunization; Essential hypertension; Foot pain; Gastroesophageal reflux disease; Hematoma; Hemiplegia and hemiparesis following cerebral infarction affecting right dominant side; Hemoptysis; Hyperlipidemia; Hyponatremia; Impacted cerumen; Inflamed seborrheic keratosis; Need for immunization against influenza; Need for prophylactic vaccination and inoculation against influenza; Nonexudative age-related macular degeneration; Nuclear senile cataract; Osteoarthritis; Osteoporosis; Pain in right hip; Plantar fasciitis; Upper respiratory infection; and Urinary tract infectious disease.    Past Surgical History:  has a past surgical history that includes Appendectomy; Coronary artery  bypass graft; Cholecystectomy; Other surgical history (09/27/2013); and Abdominal hernia repair.    Family History: family history includes Cancer in her other; Coronary artery disease in her other; Diabetes in her other; Other in her other.     Social History:  reports that she has never smoked. She has never used smokeless tobacco. She reports that she does not drink alcohol.    Allergies:   Allergies   Allergen Reactions   • Benicar [Olmesartan]    • Betadine [Povidone Iodine]    • Erythromycin Ethylsuccinate    • Iodine    • Procardia [Nifedipine]    • Tenex [Guanfacine]        Medications:     Prior to Admission medications    Medication Sig Start Date End Date Taking? Authorizing Provider   acetaminophen (TYLENOL) 500 MG tablet Take 500 mg by mouth Every Night.   Yes Historical Provider, MD   ALPRAZolam (XANAX) 0.25 MG tablet Take 1 tablet by mouth 3 (Three) Times a Day As Needed for Anxiety. 9/19/17  Yes Josseline Lentz MD   clopidogrel (PLAVIX) 75 MG tablet Take 1 tablet by mouth Daily. 3/21/17  Yes Josseline Lentz MD   fluticasone (FLONASE) 50 MCG/ACT nasal spray USE 2 SPRAYS INTO EACH NOSTRIL EVERY DAY 9/15/17  Yes Josseline Lentz MD   hydrALAZINE (APRESOLINE) 25 MG tablet TAKE 1 TABLET(S) BY MOUTH 3 TIMES PER DAY 9/15/17  Yes Josseline Lentz MD   levothyroxine (SYNTHROID, LEVOTHROID) 75 MCG tablet TAKE 1 TABLET BY MOUTH DAILY. 10/13/17  Yes Josseline Lentz MD   losartan (COZAAR) 100 MG tablet TAKE ONE TABLET BY MOUTH DAILY FOR BLOOD PRESSURE 10/13/17  Yes Josseline Lentz MD   pantoprazole (PROTONIX) 40 MG EC tablet TAKE 1 TABLET BY MOUTH ONCE DAILY AS NEEDED 7/17/17  Yes Josseline Lentz MD   simethicone (MYLICON) 80 MG chewable tablet Chew 80 mg Every 6 (Six) Hours As Needed for flatulence.   Yes Historical Provider, MD   vitamin B-12 (CYANOCOBALAMIN) 1000 MCG tablet Take 1,000 mcg by mouth Daily.   Yes Historical Provider, MD   Vitamin D, Cholecalciferol, (CHOLECALCIFEROL) 400 UNITS tablet  Take 400 Units by mouth Daily.   Yes Historical Provider, MD   mupirocin (BACTROBAN) 2 % ointment Apply  topically 3 (Three) Times a Day. 9/19/17   Josseline Lentz MD   potassium chloride ER (K-TAB) 20 MEQ tablet controlled-release ER tablet Take 20 mEq by mouth Daily.    Historical Provider, MD         Review of Systems:  Review of Systems   Constitutional: Negative for chills and fever.   Respiratory: Negative for shortness of breath.    Cardiovascular: Negative for chest pain.   Gastrointestinal: Negative for abdominal pain, nausea and vomiting.   Musculoskeletal: Positive for arthralgias.   Neurological: Negative for dizziness, light-headedness and headaches.   Psychiatric/Behavioral: Negative for confusion.      Otherwise complete ROS is negative except as mentioned above.    Physical Exam:   Temp:  [97.8 °F (36.6 °C)] 97.8 °F (36.6 °C)  Heart Rate:  [86] 86  Resp:  [18] 18  BP: (166)/(71) 166/71  Physical Exam   Constitutional: She is oriented to person, place, and time. No distress.   HENT:   Head: Atraumatic.   Eyes: Conjunctivae are normal. Pupils are equal, round, and reactive to light.   Cardiovascular: Normal rate and regular rhythm.    Pulmonary/Chest: Effort normal. No respiratory distress. She has no wheezes.   Abdominal: Soft. Bowel sounds are normal. She exhibits no distension. There is no tenderness.   Neurological: She is alert and oriented to person, place, and time.   Skin: Skin is warm and dry. She is not diaphoretic.   Psychiatric: She has a normal mood and affect. Her behavior is normal.       Distal pulses intact, capillary refill less than 2 seconds.    Results Reviewed:  I have personally reviewed current lab, radiology, and data and agree with results.  Lab Results (last 24 hours)     ** No results found for the last 24 hours. **        Imaging Results (last 24 hours)     Procedure Component Value Units Date/Time    XR Shoulder 2+ View Right [[id number]] Collected:  02/08/18 7165      Updated:  02/08/18 1752    Narrative:         EXAM:  Radiograph(s), Osseous         REGION:   Shoulder    SIDE:     Right     VIEWS:   3             INDICATION:    fall, complaining of right hip and shoulder pain      COMPARISON:    none              FINDINGS:           No evidence of a fracture or bony malalignment.     No evidence of osteolytic/osteoblastic disease.     Diffuse osteopenia.  .        Impression:       CONCLUSION:           1. No gross evidence of acute osseous pathology.              Note:  if pain or symptoms persist beyond reasonable expectations  and follow-up imaging is anticipated,  cross sectional imaging  (CT and/or MRI) is suggested, as is deemed clinically  appropriate.                      Electronically signed by:  JORGE Napier MD  2/8/2018 5:50 PM  CST Workstation: [id number]    XR Hip With or Without Pelvis 2 - 3 View Right [[id number]] Collected:  02/08/18 1731     Updated:  02/08/18 1754    Narrative:         EXAM:  Radiograph(s), Osseous         REGION:   Pelvis and right hip      VIEWS:   3             INDICATION:    fall, complaining of right hip and shoulder pain      COMPARISON:    1/26/16              FINDINGS:           Diffuse osteopenia.  Suspect the presence of an impacted subcapital fracture without  displacement.  Evaluation the remainder the bony pelvis is unremarkable.  Limited left hip assessment is unremarkable.  .        Impression:       CONCLUSION:           1. Suspected subcapital impacted fracture.  Confirmation with CT suggested.                             Electronically signed by:  JORGE Napier MD  2/8/2018 5:52 PM  CST Workstation: [id number]            Assessment:    Hospital Problem List     Hip fracture, right, closed, initial encounter              Plan:  Patient has been evaluated by orthopedics, Dr. Salmon.  Patient to undergo surgical repair.  Speech evaluation for specialized diet secondary to known dysphasia.  Pain medication as needed,  continue to treat as hospital course dictates.          This document has been electronically signed by NEAL Beauchamp on February 8, 2018 7:17 PM                 Electronically signed by Bang Granger MD at 2/12/2018 12:42 PM        Prior to Admission Medications     Prescriptions Last Dose Informant Patient Reported? Taking?    acetaminophen (TYLENOL) 500 MG tablet 2/8/2018 Child Yes Yes    Take 1,000 mg by mouth 2 (Two) Times a Day.    ALPRAZolam (XANAX) 0.25 MG tablet Patient Taking Differently Child No Yes    Take 1 tablet by mouth 3 (Three) Times a Day As Needed for Anxiety.    Patient taking differently:  Take 0.25 mg by mouth At Night As Needed for Anxiety.    clopidogrel (PLAVIX) 75 MG tablet 2/8/2018 Child No Yes    Take 1 tablet by mouth Daily.    fluticasone (FLONASE) 50 MCG/ACT nasal spray 2/8/2018 Child No Yes    USE 2 SPRAYS INTO EACH NOSTRIL EVERY DAY    hydrALAZINE (APRESOLINE) 25 MG tablet Patient Taking Differently Child No Yes    TAKE 1 TABLET(S) BY MOUTH 3 TIMES PER DAY    Patient taking differently:  TAKE 1 TABLET(S) BY MOUTH every evening    levothyroxine (SYNTHROID, LEVOTHROID) 75 MCG tablet 2/8/2018 Child No Yes    TAKE 1 TABLET BY MOUTH DAILY.    losartan (COZAAR) 100 MG tablet 2/8/2018 Child No Yes    TAKE ONE TABLET BY MOUTH DAILY FOR BLOOD PRESSURE    melatonin 3 MG tablet 2/7/2018 Child Yes Yes    Take 3 mg by mouth Every Night. Pt takes 1 (3 mg) + 1 (5 mg) = 8 mg per dose nightly.    melatonin 5 MG tablet tablet 2/7/2018 Child Yes Yes    Take 5 mg by mouth Every Night. Pt takes 1 (3 mg) + 1 (5 mg) = 8 mg per dose nightly.    pantoprazole (PROTONIX) 40 MG EC tablet Patient Taking Differently Child No Yes    TAKE 1 TABLET BY MOUTH ONCE DAILY AS NEEDED    Patient taking differently:  TAKE 1 TABLET BY MOUTH ONCE DAILY every morning    simethicone (MYLICON) 80 MG chewable tablet 2/7/2018 Child Yes Yes    Chew 80 mg Every Night.    trolamine salicylate (ASPERCREME) 10 % cream   Yes  Yes    Apply  topically As Needed for Muscle / Joint Pain.    vitamin B-12 (CYANOCOBALAMIN) 1000 MCG tablet   Yes Yes    Take 1,000 mcg by mouth Daily.    Vitamin D, Cholecalciferol, (CHOLECALCIFEROL) 400 UNITS tablet   Yes Yes    Take 1,000 Units by mouth Daily.    mupirocin (BACTROBAN) 2 % ointment   No No    Apply  topically 3 (Three) Times a Day.          Hospital Medications (active)       Dose Frequency Start End    acetaminophen (TYLENOL) tablet 650 mg 650 mg Every 4 Hours PRN 2/8/2018     Sig - Route: Take 2 tablets by mouth Every 4 (Four) Hours As Needed for Mild Pain . - Oral    albuterol (PROVENTIL) nebulizer solution 0.083% 2.5 mg/3mL 2.5 mg Every 6 Hours PRN 2/8/2018     Sig - Route: Take 2.5 mg by nebulization Every 6 (Six) Hours As Needed for Shortness of Air. - Nebulization    ALPRAZolam (XANAX) tablet 0.25 mg 0.25 mg Nightly PRN 2/8/2018     Sig - Route: Take 1 tablet by mouth At Night As Needed for Anxiety or Sleep (Hold if SBP <100 or if resting comfortably). - Oral    aspirin tablet 325 mg 325 mg Daily 2/12/2018     Sig - Route: Take 1 tablet by mouth Daily. - Oral    Cosign for Ordering: Accepted by Gladys Giordano MD on 2/12/2018 11:42 AM    bacitracin injection  As Needed 2/9/2018     Sig: As Needed.    bupivacaine (PF) (MARCAINE) 0.5 % injection  As Needed 2/9/2018     Sig: As Needed.    cefTRIAXone (ROCEPHIN) 1 g/100 mL 0.9% NS (MBP) 1 g Every 24 Hours 2/11/2018 2/18/2018    Sig - Route: Infuse 100 mL into a venous catheter Daily. - Intravenous    Cosign for Ordering: Accepted by Vinay Gaona MD on 2/11/2018  6:01 PM    clopidogrel (PLAVIX) tablet 75 mg 75 mg Daily 2/11/2018     Sig - Route: Take 1 tablet by mouth Daily. - Oral    Cosign for Ordering: Accepted by Vinay Gaona MD on 2/11/2018 10:46 AM    Docusate Sodium 100 mg 100 mg 2 Times Daily PRN 2/11/2018     Sig - Route: Take 10 mL by mouth 2 (Two) Times a Day As Needed (constipation). - Oral    doxycycline (VIBRAMYCIN) 100 mg/250  mL 0.9% NS  mg Every 12 Hours 2/11/2018 2/21/2018    Sig - Route: Infuse 250 mL into a venous catheter Every 12 (Twelve) Hours. - Intravenous    Cosign for Ordering: Accepted by Vinay Gaona MD on 2/11/2018  6:01 PM    famotidine (PEPCID) injection 20 mg 20 mg Daily 2/9/2018     Sig - Route: Infuse 2 mL into a venous catheter Daily. - Intravenous    fluticasone (FLONASE) 50 MCG/ACT nasal spray 2 spray 2 spray Daily 2/12/2018     Sig - Route: 2 sprays by Each Nare route Daily. - Each Nare    Cosign for Ordering: Accepted by Gladys Giordano MD on 2/12/2018  3:42 PM    hydrALAZINE (APRESOLINE) injection 10 mg 10 mg Every 6 Hours PRN 2/8/2018     Sig - Route: Infuse 0.5 mL into a venous catheter Every 6 (Six) Hours As Needed for High Blood Pressure. - Intravenous    levothyroxine (SYNTHROID, LEVOTHROID) tablet 75 mcg 75 mcg Every Early Morning 2/9/2018     Sig - Route: Take 1 tablet by mouth Every Morning. - Oral    lidocaine PF 1% (XYLOCAINE) injection  As Needed 2/9/2018     Sig: As Needed.    losartan (COZAAR) tablet 100 mg 100 mg Every 24 Hours Scheduled 2/9/2018     Sig - Route: Take 2 tablets by mouth Daily. - Oral    magic butt ointment  As Needed 2/12/2018     Sig - Route: Apply  topically As Needed for Rash or Irritation. - Topical    Cosign for Ordering: Accepted by Gladys Giordano MD on 2/12/2018  3:42 PM    magnesium hydroxide (MILK OF MAGNESIA) suspension 2400 mg/10mL 10 mL 10 mL Daily PRN 2/12/2018     Sig - Route: Take 10 mL by mouth Daily As Needed for Constipation or Heartburn. - Oral    Cosign for Ordering: Accepted by Gladys Giordano MD on 2/12/2018  3:42 PM    morphine injection 1 mg 1 mg Every 3 Hours PRN 2/8/2018 2/18/2018    Sig - Route: Infuse 0.5 mL into a venous catheter Every 3 (Three) Hours As Needed for Severe Pain  (Hold for SBP <100). - Intravenous    ondansetron (ZOFRAN) injection 4 mg 4 mg Every 6 Hours PRN 2/8/2018     Sig - Route: Infuse 2 mL into a venous catheter Every 6  (Six) Hours As Needed for Nausea or Vomiting. - Intravenous    pantoprazole (PROTONIX) EC tablet 40 mg 40 mg Every Early Morning 2/12/2018     Sig - Route: Take 1 tablet by mouth Every Morning. - Oral    Cosign for Ordering: Accepted by Gladys Giordano MD on 2/12/2018 11:42 AM    sodium chloride (BACTERIOSTATIC) injection  As Needed 2/9/2018     Sig: As Needed.    sodium chloride 0.9 % flush 1-10 mL 1-10 mL As Needed 2/8/2018     Sig - Route: Infuse 1-10 mL into a venous catheter As Needed for Line Care. - Intravenous    trolamine salicylate (ASPERCREME) 10 % cream  3 Times Daily PRN 2/12/2018     Sig - Route: Apply  topically 3 (Three) Times a Day As Needed for Muscle / Joint Pain. - Topical    enoxaparin (LOVENOX) syringe 30 mg (Discontinued) 30 mg Every 24 Hours 2/10/2018 2/12/2018    Sig - Route: Inject 0.3 mL under the skin Daily. - Subcutaneous             Operative/Procedure Notes (all)      Truong Salmon MD at 2/9/2018  4:45 PM  Version 1 of 1         Procedure(s):  HIP PERCUTANEOUS PINNING  Procedure Note    Isabel Asha  2/8/2018 - 2/9/2018    Pre-op Diagnosis:   Hip fracture, right, closed, initial encounter [S72.001A]    Post-op Diagnosis:     Post-Op Diagnosis Codes:     * Hip fracture, right, closed, initial encounter [S72.001A]    Procedure/CPT® Codes:      Procedure(s):  HIP PERCUTANEOUS PINNING    Surgeon(s):  Truong Salmon MD    Anesthesia: Monitor Anesthesia Care with Regional    Staff:   Circulator: Haleigh Macario RN  Scrub Person: Delmy Rivas  Assistant: Latha Benitez MA    Estimated Blood Loss: minimal     Specimens:                None none      Drains:   Urethral Catheter 02/08/18 2030 16 10 (Active)   Daily Indications < 24 hr post op 2/9/2018  4:29 PM   Securement secured to upper leg with adhesive device 2/9/2018  4:29 PM   Catheter care done Yes 2/8/2018 11:00 PM   Tolerance no signs/symptoms of discomfort 2/9/2018  8:55 AM   Urine Output (mL) 600 2/9/2018   5:00 AM           Findings: Femoral neck fracture valgus impacted.  Significant osteopenia    Complications: None    Dictation: Indications:    89-year-old who has history of stroke coronary coronary artery disease really minimal ambulator unfortunately failed with transfer has an impacted femoral neck fracture throughout discussing with her family the options of treating this with percutaneous pinning release to stabilize this I think the prognosis for his very poor as before.  She has a right hemiplegia cleared from medical standpoint.  Certainly the risks and benefits of any surgery going to do this under a ketamine with local anesthetic to minimize medication complications.  Certainly there is risk of hardware failure or screw cut out nonunion malunion AVN.  This is been discussed in detail with the family as well as her granddaughter had nursing orthopedic floor.    Procedure:    After adequate ketamine was given the patient's was placed operating table and the Telos table is brought in at the foot of the bed.  The patient had marked hip flexion contractures knee flexion contractures making positioning difficult.  The fractured right leg was placed in the leg moya and really minimal traction with a difficult time positioning the left leg is actually held by an assistant to manipulate it so we could get the C-arm control and for adequate x-rays in 2 planes.    We confirmed there was minimal posterior angulation if any at all and this was truly a valgus impacted fracture.  The hip was prepped and draped in usual sterile fashion and under C-arm control 3 guidepins were placed across the lateral edge of the trochanter up the femoral neck in a parallel fashion into the femoral head.  This bone was very soft and this was actually driven by hand we try to get these within 10 mm of the subchondral bone.  This was confirmed on 2 planes the AP and lateral plane which we used intraoperatively with the C-arm.  The  screws were measured and the appropriate length screw was then placed over the cannula.  We did this under careful C-arm control and noted that the screws were advanced slowly had to continually backed out the screw at the same time advancing the cannulated pin.  The screws were parallel on both the AP and lateral view reasonable bites were obtained and the very osteopenic bone.  The percutaneous holes were irrigated) saline solution after confirming the position of hardware and fracture.  Holes were closed with interrupted staples.  Sterile dressings were applied she was taken off the table and to the recovery room in stable condition.    I should note that prior to the procedure I injected the area with 1% plain Xylocaine and afterwards injected the infiltrated the area with half percent Marcaine without epinephrine.  She tolerated procedure well and there were no complications intraoperatively.  Counts were correct end of dictation thank you  Truong Salmon MD  02/09/18  4:46 PM             Electronically signed by Truong Salmon MD at 2/9/2018  4:55 PM           Physician Progress Notes (last 72 hours) (Notes from 2/9/2018  4:18 PM through 2/12/2018  4:18 PM)      NEAL Beauchamp at 2/10/2018 10:40 AM  Version 1 of 1    Attestation signed by Manjinder Echevarria MD at 2/10/2018  1:52 PM        I have reviewed the documentation above and agree.                                     [facility] Medicine Services  INPATIENT PROGRESS NOTE    Length of Stay: 2  Date of Admission: 2/8/2018  Primary Care Physician: Josseline Lentz MD    Subjective   Please note that all previous progress notes, lab findings, radiographical findings, medication changes, and physical exam findings have been noted and updated as appropriate.    2/10/2018: Patient currently resting comfortably.  No complaints.  Low-grade fever overnight, postsurgical.  Has completely resolved.  No nausea or  vomiting, no chest pain or shortness of air.  We'll defer to orthopedics for mobility and PT/OT.  Encourage incentive spirometry.    Chief Complaint/HPI:  This 89-year-old  female experienced a mechanical fall at home resulting in right hip fracture.  Patient is to undergo repair with Dr. Salmon of orthopedics today.  She currently has no complaints of pain and is resting comfortably.    Review of Systems   Constitutional: Negative for chills and fever.   Respiratory: Negative for shortness of breath.    Cardiovascular: Negative for chest pain.   Gastrointestinal: Negative for abdominal pain, nausea and vomiting.   Neurological: Negative for headaches.   Psychiatric/Behavioral: Negative for confusion.      All pertinent negatives and positives are as above. All other systems have been reviewed and are negative unless otherwise stated.     Objective    Temp:  [96.2 °F (35.7 °C)-100.7 °F (38.2 °C)] 97.7 °F (36.5 °C)  Heart Rate:  [70-85] 83  Resp:  [14-18] 18  BP: (122-160)/(55-73) 131/63    Physical Exam   Constitutional: She is oriented to person, place, and time. No distress.   HENT:   Head: Normocephalic and atraumatic.   Cardiovascular: Normal rate and regular rhythm.    Pulmonary/Chest: Effort normal. No respiratory distress.   Abdominal: Soft. She exhibits no distension. There is no tenderness.   Neurological: She is alert and oriented to person, place, and time.   Skin: Skin is warm and dry. She is not diaphoretic.   Psychiatric: She has a normal mood and affect. Her behavior is normal.       Distal pulses intact, capillary refill within 2 seconds.     Results Review:  I have reviewed the labs, radiology results, and diagnostic studies.    Laboratory Data:     Results from last 7 days  Lab Units 02/09/18  0536 02/08/18 2020   SODIUM mmol/L 138 133*   POTASSIUM mmol/L 4.4 4.1   CHLORIDE mmol/L 106 103   CO2 mmol/L 27.0 23.0   BUN mg/dL 13 14   CREATININE mg/dL 0.86 0.84   GLUCOSE mg/dL 100 87    CALCIUM mg/dL 8.7 9.1   BILIRUBIN mg/dL  --  0.3   ALK PHOS U/L  --  100   ALT (SGPT) U/L  --  36   AST (SGOT) U/L  --  27   ANION GAP mmol/L 5.0 7.0     Estimated Creatinine Clearance: 37.7 mL/min (by C-G formula based on Cr of 0.86).            Results from last 7 days  Lab Units 02/09/18  0536 02/08/18 2020   WBC 10*3/mm3 12.23* 15.72*   HEMOGLOBIN g/dL 9.3* 11.0*   HEMATOCRIT % 27.8* 33.2*   PLATELETS 10*3/mm3 189 236       Results from last 7 days  Lab Units 02/08/18  2126   INR  0.98       Culture Data:   No results found for: BLOODCX  No results found for: URINECX  No results found for: RESPCX  No results found for: WOUNDCX  No results found for: STOOLCX  No components found for: BODYFLD    Radiology Data:   Imaging Results (last 24 hours)     Procedure Component Value Units Date/Time    FL C Arm During Surgery [[id number]] Updated:  02/09/18 1633    XR Hip With or Without Pelvis 1 View Right [[id number]] Collected:  02/09/18 1910     Updated:  02/09/18 1954    Narrative:         EXAM:  Radiograph(s), Osseous         REGION:   Hip    SIDE:     Right     VIEWS:   1             INDICATION:    post op, S72.011A Unspecified intracapsular  fracture of right femur, initial encounter for closed fracture  S72.001A Fracture of unspecified part of neck of right femur,  initial encounter for closed fracture R13.12 Dysphagia,  oropharyngeal phase     COMPARISON:    2/8/18 (preop)              FINDINGS:           Status post repair of an impacted subcapital fracture with 3  stabilizing screws in standard position.  .        Impression:       CONCLUSION:           1. Stable postoperative examination.                             Electronically signed by:  JORGE Napier MD  2/9/2018 7:53 PM  CST Workstation: [id number]          I have reviewed the patient current medications.     Assessment/Plan     Hospital Problem List     Hip fracture, right, closed, initial encounter          Plan:  Incentive spirometry, pain control,  PT/OT per orthopedics.  Continue as hospital course dictates.              This document has been electronically signed by NEAL Beauchamp on February 10, 2018 10:40 AM         Electronically signed by Manjinder Echevarria MD at 2/10/2018  1:52 PM      Truong Salmon MD at 2/10/2018  1:46 PM  Version 1 of 1         ORTHOPEDIC PROGRESS NOTE:    Name:  Isabel Barry  Date:    2/10/2018  Date of admission:  2/8/2018    Post op day:  1 Day Post-Op  Procedure:    Procedure(s) (LRB):  HIP PERCUTANEOUS PINNING (Right)    Subjective: Doing well pain managed primarily with Tylenol, Aler    Vitals:    Vitals:    02/10/18 1300   BP:    Pulse: 72   Resp:    Temp:    SpO2: 94%       Exam: Alert, NV unchanged      ASSESSMENT:  Hospital Problem List     Hip fracture, right, closed, initial encounter        XR: post op look good    PLAN: Stable post op, will order recheck labs in am, need dvt prophylaxis  DC planning      Truong Salmon MD  02/10/18  1:46 PM               Electronically signed by Truong Salmon MD at 2/10/2018  1:48 PM      NEAL Beauchamp at 2/11/2018 10:12 AM  Version 1 of 1    Attestation signed by Vinay Gaona MD at 2/11/2018  6:02 PM        I personally evaluated and examined the patient in conjunction with Shamar rPabhakar PA-C and agree with the assessment, treatment plan, and disposition of the patient as recorded.    Exam:  General: Alert  CV: S1 + S2  Chest: Decreased air entry bilateral.  Fine bibasilar rales.  Abdo: S, NT, ND            This document has been electronically signed by Vinay Gaona MD on February 11, 2018 6:02 PM                                         [facility] Medicine Services  INPATIENT PROGRESS NOTE    Length of Stay: 3  Date of Admission: 2/8/2018  Primary Care Physician: Josseline Lentz MD    Subjective   Please note that all previous progress notes, lab findings, radiographical findings, medication changes, and  physical exam findings have been noted and updated as appropriate.    2/11/2018:  Patient has no complaints, good PO intake.  Remains mildly hypoxic on nasal cannula.  Unable to perform correct spirometry due to residual weakness/facial droop from stroke.  No fever, chills, or chest pain.  Daughter wants some type of rehab placement.    2/10/2018: Patient currently resting comfortably.  No complaints.  Low-grade fever overnight, postsurgical.  Has completely resolved.  No nausea or vomiting, no chest pain or shortness of air.  We'll defer to orthopedics for mobility and PT/OT.  Encourage incentive spirometry.    Chief Complaint/HPI:  This 89-year-old  female experienced a mechanical fall at home resulting in right hip fracture.  Patient is to undergo repair with Dr. Salmon of orthopedics today.  She currently has no complaints of pain and is resting comfortably.    Review of Systems   Constitutional: Negative for chills and fever.   Respiratory: Negative for shortness of breath.    Cardiovascular: Negative for chest pain.   Gastrointestinal: Negative for abdominal pain, nausea and vomiting.   Neurological: Negative for headaches.   Psychiatric/Behavioral: Negative for confusion.      All pertinent negatives and positives are as above. All other systems have been reviewed and are negative unless otherwise stated.     Objective    Temp:  [97.3 °F (36.3 °C)-98.1 °F (36.7 °C)] 97.6 °F (36.4 °C)  Heart Rate:  [68-76] 75  Resp:  [18-20] 18  BP: (128-158)/(60-74) 158/74    Physical Exam   Constitutional: She is oriented to person, place, and time. No distress.   HENT:   Head: Normocephalic and atraumatic.   Cardiovascular: Normal rate and regular rhythm.    Pulmonary/Chest: Effort normal. No respiratory distress. She has rales.   Abdominal: Soft. She exhibits no distension. There is no tenderness.   Neurological: She is alert and oriented to person, place, and time.   Skin: Skin is warm and dry. She is not  diaphoretic.   Psychiatric: She has a normal mood and affect. Her behavior is normal.       Distal pulses intact, capillary refill within 2 seconds.     Results Review:  I have reviewed the labs, radiology results, and diagnostic studies.    Laboratory Data:     Results from last 7 days  Lab Units 02/11/18 0538 02/09/18 0536 02/08/18 2020   SODIUM mmol/L 140 138 133*   POTASSIUM mmol/L 3.7 4.4 4.1   CHLORIDE mmol/L 109 106 103   CO2 mmol/L 23.0 27.0 23.0   BUN mg/dL 13 13 14   CREATININE mg/dL 0.81 0.86 0.84   GLUCOSE mg/dL 76 100 87   CALCIUM mg/dL 8.3* 8.7 9.1   BILIRUBIN mg/dL  --   --  0.3   ALK PHOS U/L  --   --  100   ALT (SGPT) U/L  --   --  36   AST (SGOT) U/L  --   --  27   ANION GAP mmol/L 8.0 5.0 7.0     Estimated Creatinine Clearance: 40.1 mL/min (by C-G formula based on Cr of 0.81).            Results from last 7 days  Lab Units 02/11/18 0538 02/09/18 0536 02/08/18 2020   WBC 10*3/mm3 7.39 12.23* 15.72*   HEMOGLOBIN g/dL 8.9* 9.3* 11.0*   HEMATOCRIT % 27.5* 27.8* 33.2*   PLATELETS 10*3/mm3 156 189 236       Results from last 7 days  Lab Units 02/08/18  2126   INR  0.98       Culture Data:   No results found for: BLOODCX  No results found for: URINECX  No results found for: RESPCX  No results found for: WOUNDCX  No results found for: STOOLCX  No components found for: BODYFLD    Radiology Data:   Imaging Results (last 24 hours)     ** No results found for the last 24 hours. **          I have reviewed the patient current medications.     Assessment/Plan     Hospital Problem List     Hip fracture, right, closed, initial encounter          Plan:  Chest xray to r/o development of pneumonia or fluid overload.  PO intake adequate currently, d/c fluids at this time.  PT/OT, rehab placement, continue as hospital course dictates and per orhto.  Monitor h/h.  Patient currently on lovenox for DVT prophylaxis, will currently defer long-term anticoagulation to ortho.  Coumadin may be most appropriate given age  and comorbidities.  Patient on plavix secondary to stroke.              This document has been electronically signed by NEAL Beauchamp on February 11, 2018 10:13 AM         Electronically signed by Vinay Gaona MD at 2/11/2018  6:02 PM      Truong Salmon MD at 2/11/2018  2:08 PM  Version 1 of 1         ORTHOPEDIC PROGRESS NOTE:    Name:  Isabel Barry  Date:    2/11/2018  Date of admission:  2/8/2018    Post op day:  2 Days Post-Op  Procedure:    Procedure(s) (LRB):  HIP PERCUTANEOUS PINNING (Right)    Subjective: R hip fx, she is feeling better than yesterday only on Tylenol for pain.    Vitals:    Vitals:    02/11/18 1121   BP: 149/65   Pulse: 69   Resp: 18   Temp: 98.2 °F (36.8 °C)   SpO2: 93%       Exam: She is alert and oriented today breathing comfortably.  Cast are negative the wounds look good on the right hip    Postoperative x-rays looked good      ASSESSMENT:  Hospital Problem List     Hip fracture, right, closed, initial encounter            PLAN: Dry dressing daily, I ordered low-dose Lovenox for DVT prophylaxis once a day.  The patient wants to go home.  Discussed with the family to make sure they have enough support to care for her at home.  She can probably go the next day or 2.  Physical therapy for transfers to chair pivoting on her left leg      Truong Salmon MD  02/11/18  2:08 PM               Electronically signed by Truong Salmon MD at 2/11/2018  2:10 PM      NEAL Beauchamp at 2/12/2018  9:38 AM  Version 1 of 1    Attestation signed by Gladys Giordano MD at 2/12/2018  4:04 PM        I personally evaluated and examined the patient in conjunction with JANIYA gordon and agree with the assessment, treatment plan, and disposition of the patient as recorded.   [place]ungs: [facility] Medicine Services  INPATIENT PROGRESS NOTE    Length of Stay: 4  Date of Admission: 2/8/2018  Primary  Care Physician: Josseline Lentz MD    Subjective   Please note that all previous progress notes, lab findings, radiographical findings, medication changes, and physical exam findings have been noted and updated as appropriate.    2/12/2018: Chest x-ray demonstrates a subtle pneumonia.  Patient has been started on doxycycline and ceftriaxone.  Patient continues to be hypoxic, does much better on oxygen.  She did remove her oxygen earlier today and dropped into the 80s.  Patient and family was educated.  Patient states she is in no pain and feels much better.  Case management will see today to begin planning for discharge and rehabilitation.  Lovenox has been ordered as hospital DVT prophylaxis.  Will discuss with orthopedics the risk versus benefit of long-term anticoagulation with Coumadin.    Patient is also demonstrated an incidental finding of a gram-negative bacilli urinary tract infection.  This will likely be covered by ceftriaxone give it is most likely an Escherichia coli infection.  We will continue to monitor cultures and sensitivity.    2/11/2018:  Patient has no complaints, good PO intake.  Remains mildly hypoxic on nasal cannula.  Unable to perform correct spirometry due to residual weakness/facial droop from stroke.  No fever, chills, or chest pain.  Daughter wants some type of rehab placement.    2/10/2018: Patient currently resting comfortably.  No complaints.  Low-grade fever overnight, postsurgical.  Has completely resolved.  No nausea or vomiting, no chest pain or shortness of air.  We'll defer to orthopedics for mobility and PT/OT.  Encourage incentive spirometry.    Chief Complaint/HPI:  This 89-year-old  female experienced a mechanical fall at home resulting in right hip fracture.  Patient is to undergo repair with Dr. Salmon of orthopedics today.  She currently has no complaints of pain and is resting comfortably.    Review of Systems   Constitutional: Negative for chills and  fever.   Respiratory: Negative for shortness of breath.    Cardiovascular: Negative for chest pain.   Gastrointestinal: Negative for abdominal pain, nausea and vomiting.   Neurological: Negative for headaches.   Psychiatric/Behavioral: Negative for confusion.      All pertinent negatives and positives are as above. All other systems have been reviewed and are negative unless otherwise stated.     Objective    Temp:  [97.7 °F (36.5 °C)-98.2 °F (36.8 °C)] 97.7 °F (36.5 °C)  Heart Rate:  [67-71] 70  Resp:  [18] 18  BP: (131-158)/(65-87) 158/70    Physical Exam   Constitutional: She is oriented to person, place, and time. No distress.   HENT:   Head: Normocephalic and atraumatic.   Cardiovascular: Normal rate and regular rhythm.    Pulmonary/Chest: Effort normal. No respiratory distress. She has rales.   Abdominal: Soft. She exhibits no distension. There is no tenderness.   Neurological: She is alert and oriented to person, place, and time.   Skin: Skin is warm and dry. She is not diaphoretic.   Psychiatric: She has a normal mood and affect. Her behavior is normal.       Distal pulses intact, capillary refill within 2 seconds.     Results Review:  I have reviewed the labs, radiology results, and diagnostic studies.    Laboratory Data:     Results from last 7 days  Lab Units 02/12/18  0611 02/11/18  0538 02/09/18  0536 02/08/18 2020   SODIUM mmol/L 141 140 138 133*   POTASSIUM mmol/L 3.6 3.7 4.4 4.1   CHLORIDE mmol/L 110 109 106 103   CO2 mmol/L 24.0 23.0 27.0 23.0   BUN mg/dL 12 13 13 14   CREATININE mg/dL 0.76 0.81 0.86 0.84   GLUCOSE mg/dL 78 76 100 87   CALCIUM mg/dL 8.1* 8.3* 8.7 9.1   BILIRUBIN mg/dL 0.2  --   --  0.3   ALK PHOS U/L 76  --   --  100   ALT (SGPT) U/L 24  --   --  36   AST (SGOT) U/L 23  --   --  27   ANION GAP mmol/L 7.0 8.0 5.0 7.0     Estimated Creatinine Clearance: 42.1 mL/min (by C-G formula based on Cr of 0.76).            Results from last 7 days  Lab Units 02/12/18  0611 02/11/18  0593  02/09/18  0536 02/08/18 2020   WBC 10*3/mm3 7.93 7.39 12.23* 15.72*   HEMOGLOBIN g/dL 8.4* 8.9* 9.3* 11.0*   HEMATOCRIT % 25.6* 27.5* 27.8* 33.2*   PLATELETS 10*3/mm3 174 156 189 236       Results from last 7 days  Lab Units 02/08/18  2126   INR  0.98       Culture Data:   No results found for: BLOODCX  Urine Culture   Date Value Ref Range Status   02/11/2018 >100,000 CFU/mL Gram Negative Bacilli (A)  Preliminary   02/11/2018 >100,000 CFU/mL Gram Negative Bacilli (A)  Preliminary     No results found for: RESPCX  No results found for: WOUNDCX  No results found for: STOOLCX  No components found for: BODYFLD    Radiology Data:   Imaging Results (last 24 hours)     Procedure Component Value Units Date/Time    XR Chest 1 View [[id number]] Collected:  02/11/18 1210     Updated:  02/11/18 1245    Narrative:         EXAM:         Radiograph(s), Chest   VIEWS:   Portable ; 1       DATE/TIME:  2/11/2018 12:43 PM CST                INDICATION:   Hypoxia, S72.011A Unspecified intracapsular  fracture of right femur, initial encounter for closed fracture  S72.001A Fracture of unspecified part of neck of right femur,  initial encounter for closed fracture R13.12 Dysphagia,  oropharyngeal phase    COMPARISON:  CXR: 3/8/14             FINDINGS:             - lines/tubes:    none     - cardiac:         size within normal limits         - mediastinum: contour within normal limits         - lungs:         Subtle focal airspace changes in the right base.    The left lung is clear.          - pleura:         Small amount of pleural fluid on the right.                   - osseous:         Status post median sternotomy                  - misc.:         Impression:       CONCLUSION:        1. Subtle focal airspace process in the right lower lung.                                                Electronically signed by:  JORGE Napier MD  2/11/2018 12:44  PM CST Workstation: [id number]    FL C Arm During Surgery [[id number]] Resulted:   02/12/18 0757     Updated:  02/12/18 0757          I have reviewed the patient current medications.     Assessment/Plan     Hospital Problem List     Hip fracture, right, closed, initial encounter          Plan:   Monitor hemoglobin and hematocrit, continue antibiotics for pneumonia and urinary tract infection, continue physical therapy and occupational therapy, orthopedic consult appreciated.              This document has been electronically signed by NEAL Beauchamp on February 12, 2018 9:39 AM         Electronically signed by Gladys Giordano MD at 2/12/2018  4:04 PM

## 2018-02-12 NOTE — PLAN OF CARE
Problem: Patient Care Overview (Adult)  Goal: Plan of Care Review  Outcome: Ongoing (interventions implemented as appropriate)   02/12/18 1520   Coping/Psychosocial Response Interventions   Plan Of Care Reviewed With patient;caregiver;family   Outcome Evaluation   Outcome Summary/Follow up Plan Afternoon PT session. Patient's family and caregiver present for family training. Educated them on proper transfer technique, proper use of gait belt, proper body mechanics and maintaining TDWB on RLE. All questions answered, case managament notified. Contiue skilled PT.        Problem: Inpatient Physical Therapy  Goal: Bed Mobility Goal STG- PT  Outcome: Ongoing (interventions implemented as appropriate)   02/12/18 0902 02/12/18 1520   Bed Mobility PT STG   Bed Mobility PT STG, Date Established 02/12/18 --    Bed Mobility PT STG, Time to Achieve 2 - 3 days --    Bed Mobility PT STG, Activity Type all bed mobility --    Bed Mobility PT STG, [place] Level minimum assist (75% patient effort) --    Transfer Training Goal, Assist Device bed rails --    Bed Mobility PT STG, Date Goal Reviewed --  02/12/18   Bed Mobility PT STG, Outcome --  goal ongoing     Goal: Transfer Training Goal 1 STG- PT  Outcome: Ongoing (interventions implemented as appropriate)   02/12/18 0902 02/12/18 1520   Transfer Training PT STG   Transfer Training PT STG, Date Established 02/12/18 --    Transfer Training PT STG, Time to Achieve 2 days --    Transfer Training PT STG, Activity Type sit to stand/stand to sit --    Transfer Training PT STG, [place] Level minimum assist (75% patient effort) --    Transfer Training PT STG, Additional Goal maintaing TDWB on R, support from bed rail.  --    Transfer Training PT STG, Date Goal Reviewed --  02/12/18   Transfer Training PT STG, Outcome --  goal ongoing     Goal: Transfer Training Goal 1 LTG- PT  Outcome: Ongoing (interventions implemented as appropriate)   02/12/18 0902 02/12/18 1520   Transfer  Training PT LTG   Transfer Training PT LTG, Date Established 02/12/18 --    Transfer Training PT LTG, Activity Type bed to chair /chair to bed --    Transfer Training PT LTG, [place] Level minimum assist (75% patient effort) --    Transfer Training PT LTG, Additional Goal Patient's caregivers and family will demonstrate proper technique to assist patient.  --    Transfer Training PT LTG, Date Goal Reviewed --  02/12/18   Transfer Training PT LTG, Outcome --  goal partially met

## 2018-02-12 NOTE — PLAN OF CARE
Problem: Patient Care Overview (Adult)  Goal: Plan of Care Review  Outcome: Ongoing (interventions implemented as appropriate)   02/12/18 1430   Coping/Psychosocial Response Interventions   Plan Of Care Reviewed With patient   Patient Care Overview   Progress improving   Outcome Evaluation   Outcome Summary/Follow up Plan Swallow tx completed; pt to continue puree and honey thick liquids.        Problem: Inpatient SLP  Goal: Dysphagia- Patient will safely consume diet as per recommendation with no signs/symptoms of aspiration  Outcome: Ongoing (interventions implemented as appropriate)   02/09/18 1009 02/10/18 1354 02/12/18 1430   Safely Consume Diet   Safely Consume Diet- SLP, Date Established 02/09/18 --  --    Safely Consume Diet- SLP, Time to Achieve by discharge --  --    Safely Consume Diet- SLP, Activity Level Patient will improve timing of pharyngeal response for safer, more efficient swallow --  --    Safely Consume Diet- SLP, Date Goal Reviewed --  --  02/12/18   Safely Consume Diet- SLP, Outcome --  goal not met --

## 2018-02-12 NOTE — PLAN OF CARE
Problem: Patient Care Overview (Adult)  Goal: Plan of Care Review  Outcome: Ongoing (interventions implemented as appropriate)   02/12/18 1440   Coping/Psychosocial Response Interventions   Plan Of Care Reviewed With caregiver;daughter  (sitter)   Patient Care Overview   Progress no change   Outcome Evaluation   Outcome Summary/Follow up Plan New assessment. Pt is s/p surgery for hip fx. Noted pressure ulcer also increases nutritional needs. Rd will send magic cups with all meals. HX of wt loss despite good intake per family.        Problem: Older Inpatient, Acutely Ill (Adult)  Goal: Signs and Symptoms of Listed Potential Problems Will be Absent or Manageable (Older Inpatient, Acutely Ill)  Outcome: Ongoing (interventions implemented as appropriate)   02/12/18 1440   Older Adult Inpatient, Acutely Ill   Problems Assessed (Acutely Ill Older Adult) cognitive impairment;constipation;skin breakdown;undernutrition;fluid imbalance   Problems Present (Acutely Ill Older Adult) cognitive impairment;skin breakdown;constipation

## 2018-02-12 NOTE — PLAN OF CARE
Problem: Patient Care Overview (Adult)  Goal: Plan of Care Review  Outcome: Ongoing (interventions implemented as appropriate)   02/12/18 0902   Coping/Psychosocial Response Interventions   Plan Of Care Reviewed With patient;caregiver;daughter   Outcome Evaluation   Outcome Summary/Follow up Plan Initial PT evaluation complete. Patient is alert, cooperative but anxious about transferring. Patient requires max Ax1-2 for bed mobility, max Ax1 with transfers; maintains TDWB on RLE. Caregiver and family member present, encouraged them to alert nurse when they are all present so that PT can eduated them in proper transfer technique. Continue skilled PT. Recommend HHPT upon discharge to further reinforce proper transfer technique.        Problem: Inpatient Physical Therapy  Goal: Bed Mobility Goal STG- PT  Outcome: Ongoing (interventions implemented as appropriate)   02/12/18 0902   Bed Mobility PT STG   Bed Mobility PT STG, Date Established 02/12/18   Bed Mobility PT STG, Time to Achieve 2 - 3 days   Bed Mobility PT STG, Activity Type all bed mobility   Bed Mobility PT STG, [place] Level minimum assist (75% patient effort)   Transfer Training Goal, Assist Device bed rails   Bed Mobility PT STG, Date Goal Reviewed 02/12/18   Bed Mobility PT STG, Outcome goal ongoing     Goal: Transfer Training Goal 1 STG- PT  Outcome: Ongoing (interventions implemented as appropriate)   02/12/18 0902   Transfer Training PT STG   Transfer Training PT STG, Date Established 02/12/18   Transfer Training PT STG, Time to Achieve 2 days   Transfer Training PT STG, Activity Type sit to stand/stand to sit   Transfer Training PT STG, [place] Level minimum assist (75% patient effort)   Transfer Training PT STG, Additional Goal maintaing TDWB on R, support from bed rail.    Transfer Training PT STG, Date Goal Reviewed 02/12/18   Transfer Training PT STG, Outcome goal ongoing     Goal: Transfer Training Goal 1 LTG- PT  Outcome: Ongoing  (interventions implemented as appropriate)   02/12/18 0902   Transfer Training PT LTG   Transfer Training PT LTG, Date Established 02/12/18   Transfer Training PT LTG, Activity Type bed to chair /chair to bed   Transfer Training PT LTG, [place] Level minimum assist (75% patient effort)   Transfer Training PT LTG, Additional Goal Patient's caregivers and family will demonstrate proper technique to assist patient.    Transfer Training PT LTG, Date Goal Reviewed 02/12/18   Transfer Training PT LTG, Outcome goal ongoing

## 2018-02-13 NOTE — PLAN OF CARE
Problem: Patient Care Overview (Adult)  Goal: Plan of Care Review  Outcome: Ongoing (interventions implemented as appropriate)   02/13/18 1431   Coping/Psychosocial Response Interventions   Plan Of Care Reviewed With patient   Patient Care Overview   Progress improving   Outcome Evaluation   Outcome Summary/Follow up Plan Pt tolerating puree and honey thick liquids with cues to double swallow, small sips/bites, and peform good oral care after meals.        Problem: Inpatient SLP  Goal: Dysphagia- Patient will safely consume diet as per recommendation with no signs/symptoms of aspiration  Outcome: Ongoing (interventions implemented as appropriate)   02/09/18 1009 02/10/18 1354 02/13/18 1431   Safely Consume Diet   Safely Consume Diet- SLP, Date Established 02/09/18 --  --    Safely Consume Diet- SLP, Time to Achieve by discharge --  --    Safely Consume Diet- SLP, Activity Level Patient will improve timing of pharyngeal response for safer, more efficient swallow --  --    Safely Consume Diet- SLP, Date Goal Reviewed --  --  02/13/18   Safely Consume Diet- SLP, Outcome --  goal not met --

## 2018-02-13 NOTE — PROGRESS NOTES
ORTHOPEDIC PROGRESS NOTE:    Name:  Isabel Barry  Date:    2/13/2018  Date of admission:  2/8/2018    Post op day:  4 Days Post-Op  Procedure:    Procedure(s) (LRB):  HIP PERCUTANEOUS PINNING (Right)    Subjective: Continues to improve    Vitals:    Vitals:    02/13/18 0911   BP: 153/70   Pulse: 83   Resp: 18   Temp: 97.1 °F (36.2 °C)   SpO2: 95%       Exam: A&Ox3  Sitting comfortably      ASSESSMENT:  Hospital Problem List     Hip fracture, right, closed, initial encounter            PLAN: Miriamn well , eating, spoke with Medicine today, DC lovenox and dvt ppx. With ASA/Plavix. S/R in 10 days, would reXR 3-4 weeks, could DC when stable medically as H/H trended down some      Truong Salmon MD  02/13/18  11:17 AM

## 2018-02-13 NOTE — PLAN OF CARE
Problem: Patient Care Overview (Adult)  Goal: Plan of Care Review  Outcome: Ongoing (interventions implemented as appropriate)    Goal: Adult Individualization and Mutuality  Outcome: Ongoing (interventions implemented as appropriate)    Goal: Discharge Needs Assessment  Outcome: Ongoing (interventions implemented as appropriate)      Problem: Fall Risk (Adult)  Goal: Identify Related Risk Factors and Signs and Symptoms  Outcome: Ongoing (interventions implemented as appropriate)    Goal: Absence of Falls  Outcome: Ongoing (interventions implemented as appropriate)      Problem: Orthopaedic Fracture (Adult)  Goal: Signs and Symptoms of Listed Potential Problems Will be Absent or Manageable (Orthopaedic Fracture)  Outcome: Ongoing (interventions implemented as appropriate)      Problem: Perioperative Period (Adult)  Goal: Signs and Symptoms of Listed Potential Problems Will be Absent or Manageable (Perioperative Period)  Outcome: Ongoing (interventions implemented as appropriate)      Problem: Older Inpatient, Acutely Ill (Adult)  Goal: Signs and Symptoms of Listed Potential Problems Will be Absent or Manageable (Older Inpatient, Acutely Ill)  Outcome: Ongoing (interventions implemented as appropriate)

## 2018-02-13 NOTE — THERAPY TREATMENT NOTE
Acute Care - Physical Therapy Treatment Note  [facility]     Patient Name: Isabel Barry  : 1924  MRN: [id number]  Today's Date: 2018  Onset of Illness/Injury or Date of Surgery Date: 18  Date of Referral to PT: 18  Referring Physician: MIKHAIL Salmon MD.    Admit Date: 2018    Visit Dx:    ICD-10-CM ICD-9-CM   1. Closed subcapital fracture of right femur, initial encounter S72.011A 820.09   2. Hip fracture, right, closed, initial encounter S72.001A 820.8   3. Oropharyngeal dysphagia R13.12 787.22   4. Impaired physical mobility Z74.09 781.99     Patient Active Problem List   Diagnosis   • Acquired hypothyroidism   • Cerebrovascular accident   • Coronary arteriosclerosis   • Essential hypertension   • Gastroesophageal reflux disease   • Hyperlipidemia   • Anxiety state   • Hip fracture, right, closed, initial encounter               Adult Rehabilitation Note       18 1400 18 0935 18 0800    Rehab Assessment/Intervention    Discipline physical therapy assistant  -AM physical therapy assistant  -AM speech language pathologist  -EK    Document Type therapy note (daily note)  -AM therapy note (daily note)  -AM therapy note (daily note)  -EK    Subjective Information agree to therapy;no complaints  -AM agree to therapy;no complaints  -AM no complaints;agree to therapy  -EK    Patient Effort, Rehab Treatment fair  -AM fair  -AM good  -EK    Symptoms Noted During/After Treatment fatigue  -AM fatigue  -AM fatigue  -EK    Precautions/Limitations fall precautions;oxygen therapy device and L/min;other (see comments)   TTWB RLE  -AM fall precautions;oxygen therapy device and L/min;other (see comments)   TTWB RLE  -AM     Equipment Issued to Patient gait belt  -AM gait belt  -AM     Recorded by [AM] Augie Vanegas PTA [AM] Augie Vanegas PTA [EK] Magaly Deleon CCC-SLP    Vital Signs    Pre Systolic BP Rehab 173  -AM      Pre Treatment Diastolic BP 75  -AM       Post Systolic BP Rehab 178  -AM      Post Treatment Diastolic BP 77  -AM      Pretreatment Heart Rate (beats/min) 83  -AM      Posttreatment Heart Rate (beats/min) 72  -AM      Pre SpO2 (%) 93  -AM      O2 Delivery Pre Treatment room air  -AM      Post SpO2 (%) 92  -AM      O2 Delivery Post Treatment room air  -AM      Pre Patient Position Side Lying  -AM Sitting  -AM     Intra Patient Position Standing  -AM Standing  -AM     Post Patient Position Sitting  -AM Side Lying  -AM     Recorded by [AM] Augie Vanegas PTA [AM] Augie Vanegas PTA     Pain Assessment    Pain Assessment No/denies pain  -AM No/denies pain  -AM No/denies pain  -EK    Recorded by [AM] Augie Vanegas PTA [AM] Augie Vanegas PTA [EK] MOHSEN Morataya    Cognitive Assessment/Intervention    Current Cognitive/Communication Assessment functional  -AM functional  -AM impaired  -EK    Orientation Status oriented to;person  -AM oriented to;person  -AM oriented to;person;place  -EK    Follows Commands/Answers Questions able to follow single-step instructions;needs cueing;needs increased time;needs repetition  -AM able to follow single-step instructions;needs cueing;needs increased time;needs repetition  -AM 75% of the time  -EK    Personal Safety Interventions gait belt;nonskid shoes/slippers when out of bed;supervised activity  -AM gait belt;nonskid shoes/slippers when out of bed;supervised activity  -AM     Recorded by [AM] Augie Vanegas PTA [AM] Augie Vanegas PTA [EK] MOHSEN [place]aya    Dysphagia Treatment Objectives and Progress    Dysphagia Treatment Objectives   Improve timing of pharyngeal response;Other 1  -EK    Recorded by   [EK] MOHSEN Morataya    Improve timing of pharyngeal response    To improve timing of pharyngeal response, patient will:   Swallow in timely way after sensory input;90%  -EK    Status: Improve timing of pharyngeal response   Progress slower than  expected  -EK    Timing of Pharyngeal Response Progress   60%  -EK    Comments: Improve timing of pharyngeal response   Delayed swallow noted; pt required multiple cues to swallow and double swallow to clear residue from oral cavity.   -EK    Recorded by   [EK] Magaly Deleon CCC-SLP    Dysphagia Other 1    Dysphagia Other 1 Objective   pt will tolerate recommended diet with no s/s of aspiration  -EK    Status: Dysphagia Other 1   Progress slower than expected  -EK    Dysphagia Other 1 Progress   60%  -EK    Comments: Dysphagia Other 1   Pt with s/s of aspiration on various trials of honey thick liquid and puree.   -EK    Recorded by   [EK] Magaly Deleon CCC-SLP    ROM (Range of Motion)    General ROM lower extremity range of motion deficits identified  -AM lower extremity range of motion deficits identified  -AM     Recorded by [AM] Augie Vanegas PTA [AM] Augie Vanegas PTA     General LE Assessment    ROM RLE ROM was WFL  -AM RLE ROM was WFL  -AM     Recorded by [AM] Augie Vanegas PTA [AM] Augie Vanegas PTA     Mobility Assessment/Training    Extremity Weight-Bearing Status right lower extremity  -AM right lower extremity  -AM     Right Lower Extremity Weight-Bearing touch down weight-bearing  -AM touch down weight-bearing  -AM     Recorded by [AM] Augie Vanegas PTA [AM] Augie Vanegas PTA     Bed Mobility, Assessment/Treatment    Bed Mobility, Assistive Device head of bed elevated  -AM head of bed elevated  -AM     Bed Mobility, Roll Left, [place] not tested  -AM not tested  -AM     Bed Mobility, Roll Right, [place] not tested  -AM not tested  -AM     Bed Mobility, Scoot/Bridge, [place] not tested  -AM not tested  -AM     Bed Mob, Supine to Sit, [place] maximum assist (25% patient effort);2 person assist required  -AM maximum assist (25% patient effort);2 person assist required  -AM     Bed Mob, Sit to Supine, [place] maximum assist (25%  patient effort);2 person assist required  -AM maximum assist (25% patient effort);2 person assist required  -AM     Bed Mob, Sidelying to Sit, [place] not tested  -AM not tested  -AM     Bed Mob, Sit to Sidelying, [place] not tested  -AM not tested  -AM     Bed Mobility, Safety Issues cognitive deficits limit understanding;decreased use of arms for pushing/pulling;decreased use of legs for bridging/pushing  -AM cognitive deficits limit understanding;decreased use of arms for pushing/pulling;decreased use of legs for bridging/pushing  -AM     Bed Mobility, Impairments ROM decreased;strength decreased  -AM ROM decreased;strength decreased  -AM     Recorded by [AM] Augie Vanegas PTA [AM] Augie Vanegas PTA     Transfer Assessment/Treatment    Transfers, Bed-Chair [place] maximum assist (25% patient effort);2 person assist required  -AM maximum assist (25% patient effort);2 person assist required  -AM     Transfers, Chair-Bed [place] maximum assist (25% patient effort);2 person assist required  -AM maximum assist (25% patient effort);2 person assist required  -AM     Transfers, Bed-Chair-Bed, Assist Device other (see comments)   HHA  -AM other (see comments)   HHA  -AM     Transfers, Sit-Stand [place] maximum assist (25% patient effort);2 person assist required  -AM maximum assist (25% patient effort);2 person assist required  -AM     Transfers, Stand-Sit [place] maximum assist (25% patient effort);2 person assist required  -AM maximum assist (25% patient effort);2 person assist required  -AM     Transfers, Sit-Stand-Sit, Assist Device other (see comments)   HHA  -AM other (see comments)   HHA  -AM     Toilet Transfer, [place] not tested  -AM not tested  -AM     Walk-In Shower Transfer, [place] not tested  -AM not tested  -AM     Bathtub Transfer, [place] not tested  -AM not tested  -AM     Transfer, Maintain Weight Bearing Status unable to maintain weight bearing  status;assist to maintain weight bearing status;cues to maintain weight bearing status  -AM unable to maintain weight bearing status;assist to maintain weight bearing status;cues to maintain weight bearing status  -AM     Transfer, Impairments ROM decreased;strength decreased  -AM ROM decreased;strength decreased  -AM     Recorded by [AM] Augie Vanegas PTA [AM] Augie Vanegas PTA     Gait Assessment/Treatment    Gait, [place] Level not tested  -AM not tested  -AM     Recorded by [AM] Augie Vanegas PTA [AM] Augie Vanegas PTA     Stairs Assessment/Treatment    Stairs, [place] Level not tested  -AM not tested  -AM     Recorded by [AM] Augie Vanegas PTA [AM] Augie Vanegas PTA     Positioning and Restraints    Pre-Treatment Position in bed  -AM bedside commode  -AM in bed  -EK    Post Treatment Position chair  -AM bed  -AM bed  -EK    In Bed  side lying right;call light within reach;encouraged to call for assist;exit alarm on;with family/caregiver  -AM encouraged to call for assist;call light within reach;exit alarm on  -EK    In Chair reclined;call light within reach;encouraged to call for assist;with family/caregiver;legs elevated  -AM      Recorded by [AM] Augie Vanegas PTA [AM] Augie Vanegas PTA [EK] Magaly Deleon CCC-SLP      02/12/18 1520 02/12/18 0808 02/11/18 0808    Rehab Assessment/Intervention    Discipline physical therapist  -CZ speech language pathologist  -EK     Document Type therapy note (daily note)  -CZ therapy note (daily note)  -EK     Subjective Information agree to therapy;complains of;fatigue  -CZ no complaints;agree to therapy  -EK     Patient Effort, Rehab Treatment adequate  -CZ good  -EK     Symptoms Noted During/After Treatment fatigue  -CZ fatigue  -EK     Precautions/Limitations  fall precautions  -EK     Equipment Issued to Patient gait belt  -CZ      Recorded by [CZ] Seth Rice, PT [EK] Magaly Deleon CCC-SLP     Vital  Signs    Intra SpO2 (%) 90  -CZ      O2 Delivery Intra Treatment supplemental O2  -CZ      Intra Patient Position Sitting  -CZ      Recorded by [CZ] Seth Rice, PT      Pain Assessment    Pain Assessment No/denies pain  -CZ      Recorded by [CZ] Seth Rice, PT      Cognitive Assessment/Intervention    Current Cognitive/Communication Assessment impaired  -CZ impaired  -EK     Orientation Status  oriented to;person  -EK     Follows Commands/Answers Questions  75% of the time  -EK     Personal Safety  moderate impairment  -EK     Recorded by [CZ] Seth Rice, PT [EK] Magaly Deleon CCC-SLP     Dysphagia Treatment Objectives and Progress    Dysphagia Treatment Objectives  Improve timing of pharyngeal response;Other 1  -EK Improve timing of pharyngeal response;Other 1  -EK    Recorded by  [EK] MOHSEN Morataya [EK] MOHSEN Morataya    Improve timing of pharyngeal response    To improve timing of pharyngeal response, patient will:  Swallow in timely way after sensory input;90%  -EK Swallow in timely way after sensory input;90%  -EK    Status: Improve timing of pharyngeal response  Progressing as expected  -EK Progressing as expected  -EK    Timing of Pharyngeal Response Progress  80%  -EK 80%  -EK    Comments: Improve timing of pharyngeal response  Pt with delayed swallow; pt required cues to double swallow to clear oral residue.   -EK     Recorded by  [EK] MOHSEN Morataya [EK] MOHSEN Morataya    Dysphagia Other 1    Dysphagia Other 1 Objective  pt will tolerate recommended diet with no s/s of aspiration  -EK pt will tolerate recommended diet with no s/s of aspiration  -EK    Status: Dysphagia Other 1  Progressing as expected  -EK Progressing as expected  -EK    Dysphagia Other 1 Progress  80%  -EK 80%  -EK    Comments: Dysphagia Other 1  Pt with mild pocketing of puree and trials of bread. Trials of bread by SLP  only due to pocketing.   -EK SLP trialed bread based on pt/family request. Pt will allow bread to soften and then swallow. Pt displays difficulty with soft items and will expectorate from oral cavity.   -EK    Recorded by  [EK] MOHSEN Morataya [EK] MOHSEN Morataya    Bed Mobility, Assessment/Treatment    Bed Mobility, Assistive Device bed rails;head of bed elevated  -CZ      Bed Mobility, Roll Left, [place] maximum assist (25% patient effort)  -CZ      Bed Mob, Supine to Sit, [place] maximum assist (25% patient effort)  -CZ      Bed Mobility, Comment caregiver and staff performed transfer with SPV from PT.   -CZ      Recorded by [CZ] Seth Rice, PT      Transfer Assessment/Treatment    Transfers, Bed-Chair [place] maximum assist (25% patient effort)  -CZ      Transfer, Comment Transferred toward R to allow patient support on bed rail with stronger LUE.   -CZ      Recorded by [CZ] Seth Rice, PT      Positioning and Restraints    Pre-Treatment Position in bed  -CZ in bed  -EK in bed  -EK    Post Treatment Position chair  -CZ bed  -EK bed  -EK    In Bed supine;with family/caregiver  -CZ call light within reach;exit alarm on;with family/caregiver  -EK call light within reach;encouraged to call for assist;exit alarm on;patient within staff view;with family/caregiver  -EK    Recorded by [CZ] Seth Rice, PT [EK] MOHSEN Morataya [EK] MOHSEN Morataya      User Key  (r) = Recorded By, (t) = Taken By, (c) = Cosigned By    Initials Name Effective Dates    EK MOHSEN Morataya 04/06/17 -     AM Augie Vanegas, PTA 10/17/16 -     CZ Seth Rice, PT 02/17/17 -                 IP PT Goals       02/12/18 1520 02/12/18 0902       Bed Mobility PT STG    Bed Mobility PT STG, Date Established  02/12/18  -CZ     Bed Mobility PT STG, Time to Achieve  2 - 3 days  -CZ     Bed Mobility PT STG, Activity  Type  all bed mobility  -CZ     Bed Mobility PT STG, [place] Level  minimum assist (75% patient effort)  -CZ     Transfer Training Goal, Assist Device  bed rails  -CZ     Bed Mobility PT STG, Date Goal Reviewed 02/12/18  -CZ 02/12/18  -CZ     Bed Mobility PT STG, Outcome goal ongoing  -CZ goal ongoing  -CZ     Transfer Training PT STG    Transfer Training PT STG, Date Established  02/12/18  -CZ     Transfer Training PT STG, Time to Achieve  2 days  -CZ     Transfer Training PT STG, Activity Type  sit to stand/stand to sit  -CZ     Transfer Training PT STG, [place] Level  minimum assist (75% patient effort)  -CZ     Transfer Training PT STG, Additional Goal  maintaing TDWB on R, support from bed rail.   -CZ     Transfer Training PT STG, Date Goal Reviewed 02/12/18  -CZ 02/12/18  -CZ     Transfer Training PT STG, Outcome goal ongoing  -CZ goal ongoing  -CZ     Transfer Training PT LTG    Transfer Training PT LTG, Date Established  02/12/18  -CZ     Transfer Training PT LTG, Activity Type  bed to chair /chair to bed  -CZ     Transfer Training PT LTG, [place] Level  minimum assist (75% patient effort)  -CZ     Transfer Training PT LTG, Additional Goal  Patient's caregivers and family will demonstrate proper technique to assist patient.   -CZ     Transfer Training PT  LTG, Date Goal Reviewed 02/12/18  -CZ 02/12/18  -CZ     Transfer Training PT LTG, Outcome goal partially met  -CZ goal ongoing  -CZ       User Key  (r) = Recorded By, (t) = Taken By, (c) = Cosigned By    Initials Name Provider Type    ALLIE Rice PT Physical Therapist          Physical Therapy Education     Title: PT OT SLP Therapies (Active)     Topic: Physical Therapy (Active)     Point: Mobility training (Active)    Learning Progress Summary    Learner Readiness Method Response Comment Documented by Status   Patient Acceptance E NR Educated patient and caregiver on proper transfer technique and use of gait belt.  Family and  caregiver open to training session; they are to alert nurse when they will all be present.  02/12/18 1150 Active   Caregiver Acceptance E NR Educated patient and caregiver on proper transfer technique and use of gait belt.  Family and caregiver open to training session; they are to alert nurse when they will all be present.  02/12/18 1150 Active               Point: Body mechanics (Done)    Learning Progress Summary    Learner Readiness Method Response Comment Documented by Status   Patient Acceptance E VU Educated caregiver and family on proper transfer technique: Rolling L, supine to sit, sitting EOB, squat pivot to R to recliner; use of gait belt, proper body mechanics and maintaining TDWB on RLE.  02/12/18 1613 Done   Family Acceptance E VU Educated caregiver and family on proper transfer technique: Rolling L, supine to sit, sitting EOB, squat pivot to R to recliner; use of gait belt, proper body mechanics and maintaining TDWB on RLE.  02/12/18 1613 Done   Caregiver Acceptance E VU Educated caregiver and family on proper transfer technique: Rolling L, supine to sit, sitting EOB, squat pivot to R to recliner; use of gait belt, proper body mechanics and maintaining TDWB on RLE.  02/12/18 1613 Done               Point: Precautions (Active)    Learning Progress Summary    Learner Readiness Method Response Comment Documented by Status   Patient Acceptance E NR Educated on TDWB of R LE during transfers.  02/12/18 1151 Active                      User Key     Initials Effective Dates Name Provider Type Discipline     02/17/17 -  Seth Rice PT Physical Therapist PT                    PT Recommendation and Plan  Anticipated Discharge Disposition: skilled nursing facility  PT Frequency: other (see comments) (5-14x/week)  Plan of Care Review  Plan Of Care Reviewed With: patient  Progress: unable to show any progress toward functional goals  Outcome Summary/Follow up Plan: Pt did not meet any PT goals  this tx. Pt able t/f in/out bed Max A2. Pt able to stand pivot Max A2. Pt unable to maintain TTWB. Pt would benefit from SNF placement.           Outcome Measures       02/13/18 1400 02/13/18 0935 02/12/18 0902    How much help from another person do you currently need...    Turning from your back to your side while in flat bed without using bedrails? 2  -AM 2  -AM 1  -CZ    Moving from lying on back to sitting on the side of a flat bed without bedrails? 2  -AM 2  -AM 1  -CZ    Moving to and from a bed to a chair (including a wheelchair)? 2  -AM 2  -AM 1  -CZ    Standing up from a chair using your arms (e.g., wheelchair, bedside chair)? 2  -AM 2  -AM 1  -CZ    Climbing 3-5 steps with a railing? 1  -AM 1  -AM 1  -CZ    To walk in hospital room? 1  -AM 1  -AM 1  -CZ    AM-PAC 6 Clicks Score 10  -AM 10  -AM 6  -CZ    Functional Assessment    Outcome Measure Options AM-PAC 6 Clicks Basic Mobility (PT)  -AM AM-PAC 6 Clicks Basic Mobility (PT)  -AM AM-PAC 6 Clicks Basic Mobility (PT)  -CZ      User Key  (r) = Recorded By, (t) = Taken By, (c) = Cosigned By    Initials Name Provider Type    JASON Vanegas PTA Physical Therapy Assistant    ALLIE Rice, PT Physical Therapist           Time Calculation:           PT G-Codes  PT Professional Judgement Used?: Yes  Outcome Measure Options: AM-PAC 6 Clicks Basic Mobility (PT)  Score: 6  Functional Limitation: Changing and maintaining body position  Changing and Maintaining Body Position Current Status (): 100 percent impaired, limited or restricted  Changing and Maintaining Body Position Goal Status (): At least 80 percent but less than 100 percent impaired, limited or restricted    Augie Vanegas PTA  2/13/2018

## 2018-02-13 NOTE — PLAN OF CARE
Problem: Patient Care Overview (Adult)  Goal: Plan of Care Review  Outcome: Ongoing (interventions implemented as appropriate)   02/13/18 1400   Coping/Psychosocial Response Interventions   Plan Of Care Reviewed With patient   Patient Care Overview   Progress unable to show any progress toward functional goals   Outcome Evaluation   Outcome Summary/Follow up Plan Pt did not meet any PT goals this tx. Pt able t/f in/out bed Max A2. Pt able to stand pivot Max A2. Pt unable to maintain TTWB. Pt would benefit from SNF placement.

## 2018-02-13 NOTE — PLAN OF CARE
Problem: Patient Care Overview (Adult)  Goal: Plan of Care Review  Outcome: Ongoing (interventions implemented as appropriate)   02/13/18 1659   Coping/Psychosocial Response Interventions   Plan Of Care Reviewed With patient   Outcome Evaluation   Outcome Summary/Follow up Plan OT eval complete on this date. Pt required max x 1-2 for transfer chair to bed. Max A for bed mobility. Pt may benefit from OT services to increase L UE str to assist with bed mobility and sitting balance.     Goal: Discharge Needs Assessment  Outcome: Ongoing (interventions implemented as appropriate)   02/13/18 1659   Discharge Needs Assessment   Discharge Disposition skilled nursing facility       Problem: Inpatient Occupational Therapy  Goal: Transfer Training Goal 1 LTG- OT  Outcome: Ongoing (interventions implemented as appropriate)   02/13/18 1659   Transfer Training OT LTG   Transfer Training OT LTG, Date Established 02/13/18   Transfer Training OT LTG, Time to Achieve by discharge   Transfer Training OT LTG, Activity Type bed to chair /chair to bed   Transfer Training OT LTG, [place] Level moderate assist (50% patient effort);other (see comments)  (of 1-2 with gait belt.)     Goal: Strength Goal LTG- OT  Outcome: Ongoing (interventions implemented as appropriate)   02/13/18 1659   Strength OT LTG   Strength Goal OT LTG, Date Established 02/13/18   Strength Goal OT LTG, Time to Achieve by discharge   Strength Goal OT LTG, Measure to Achieve 1-2 sets of 10 reps all planes with 1/2 to 1 lb wrist wt or dumbell for L UE strengthening to increase independence with bed mobility and transfers     Goal: Dynamic Sitting Balance Goal LTG- OT  Outcome: Ongoing (interventions implemented as appropriate)   02/13/18 1659   Dynamic Sitting Balance OT LTG   Dynamic Sitting Balance OT LTG, Date Established 02/13/18   Dynamic Sitting Balance OT LTG, Time to Achieve by discharge   Dynamic Sitting Balance OT LTG, [place] Level contact guard  assist;supervision required  (5 - 10 minutes with functional activity.)   Dynamic Sitting Balance OT LTG, Assist Device bed rails     Goal: Caregiver Training Goal LTG- OT  Outcome: Ongoing (interventions implemented as appropriate)   02/13/18 5529   Caregiver Training OT LTG   Caregiver Training OT LTG, Date Established 02/13/18   Caregiver Training OT LTG, Time to Achieve by discharge   Caregiver Training OT LTG, Activity Type Transfer with gait belt and touch down weight bearing on R LE.   Caregiver Training OT LTG, [place] Level able to assist adequately

## 2018-02-13 NOTE — THERAPY TREATMENT NOTE
Acute Care - Speech Language Pathology   Swallow Treatment Note [facility]     Patient Name: Isabel Barry  : 1924  MRN: [id number]  Today's Date: 2018  Onset of Illness/Injury or Date of Surgery Date: 18            Admit Date: 2018     1. Pt to swallow in a timely way with po trials with 90% accuracy. 60% accuracy; pt with more oral residue and need for cues to double swallow.   2. Pt to tolerate recommended diet for safe and adequate nutrition/hydration: Pt exhibited more coughing and throat clearing this date. SLP fed slowly and only allowed trials with bread when oral cavity clear.       Pt to continue puree and honey thick liquids. SLP educated on need for good oral care after every meal. Discussed monitoring s/s of aspiration (watch for drop in oxygen sats, fever, coughing, etc.). Also, discussed cueing   pt to double swallow and not add additional food to oral cavity until oral cavity empty. SLP educated personal caregiver at length regarding aspiration, risk of aspiration pneumonia, and swallow safety.     Visit Dx:      ICD-10-CM ICD-9-CM   1. Closed subcapital fracture of right femur, initial encounter S72.011A 820.09   2. Hip fracture, right, closed, initial encounter S72.001A 820.8   3. Oropharyngeal dysphagia R13.12 787.22   4. Impaired physical mobility Z74.09 781.99     Patient Active Problem List   Diagnosis   • Acquired hypothyroidism   • Cerebrovascular accident   • Coronary arteriosclerosis   • Essential hypertension   • Gastroesophageal reflux disease   • Hyperlipidemia   • Anxiety state   • Hip fracture, right, closed, initial encounter             Adult Rehabilitation Note       18 0800 18 1520 18 0808    Rehab Assessment/Intervention    Discipline speech language pathologist  -EK physical therapist  -CZ speech language pathologist  -EK    Document Type therapy note (daily note)  -EK therapy note (daily note)  -CZ therapy note (daily note)  -EK     Subjective Information no complaints;agree to therapy  -EK agree to therapy;complains of;fatigue  -CZ no complaints;agree to therapy  -EK    Patient Effort, Rehab Treatment good  -EK adequate  -CZ good  -EK    Symptoms Noted During/After Treatment fatigue  -EK fatigue  -CZ fatigue  -EK    Precautions/Limitations   fall precautions  -EK    Equipment Issued to Patient  gait belt  -CZ     Recorded by [EK] MOHSEN Morataya [CZ] Seth Rice, PT [EK] MOHSEN Morataya    Vital Signs    Intra SpO2 (%)  90  -CZ     O2 Delivery Intra Treatment  supplemental O2  -CZ     Intra Patient Position  Sitting  -CZ     Recorded by  [CZ] Seth Rice, PT     Pain Assessment    Pain Assessment No/denies pain  -EK No/denies pain  -CZ     Recorded by [EK] MOHSEN Morataya [CZ] Seth Rice, PT     Cognitive Assessment/Intervention    Current Cognitive/Communication Assessment impaired  -EK impaired  -CZ impaired  -EK    Orientation Status oriented to;person;place  -EK  oriented to;person  -EK    Follows Commands/Answers Questions 75% of the time  -EK  75% of the time  -EK    Personal Safety   moderate impairment  -EK    Recorded by [EK] MOHSEN Morataya [CZ] Seth Rice, PT [EK] MOHSEN Morataya    Dysphagia Treatment Objectives and Progress    Dysphagia Treatment Objectives Improve timing of pharyngeal response;Other 1  -EK  Improve timing of pharyngeal response;Other 1  -EK    Recorded by [EK] MOHSEN Morataya  [EK] MOHSEN Morataya    Improve timing of pharyngeal response    To improve timing of pharyngeal response, patient will: Swallow in timely way after sensory input;90%  -EK  Swallow in timely way after sensory input;90%  -EK    Status: Improve timing of pharyngeal response Progress slower than expected  -EK  Progressing as expected  -EK    Timing of Pharyngeal Response Progress  60%  -EK  80%  -EK    Comments: Improve timing of pharyngeal response Delayed swallow noted; pt required multiple cues to swallow and double swallow to clear residue from oral cavity.   -EK  Pt with delayed swallow; pt required cues to double swallow to clear oral residue.   -EK    Recorded by [EK] Magaly Deleon, MC-SLP  [EK] Magaly Deleon CCC-SLP    Dysphagia Other 1    Dysphagia Other 1 Objective pt will tolerate recommended diet with no s/s of aspiration  -EK  pt will tolerate recommended diet with no s/s of aspiration  -EK    Status: Dysphagia Other 1 Progress slower than expected  -EK  Progressing as expected  -EK    Dysphagia Other 1 Progress 60%  -EK  80%  -EK    Comments: Dysphagia Other 1 Pt with s/s of aspiration on various trials of honey thick liquid and puree.   -EK  Pt with mild pocketing of puree and trials of bread. Trials of bread by SLP only due to pocketing.   -EK    Recorded by [EK] Magaly Deleon CCC-SLP  [EK] Magaly Deleon, CCC-SLP    Bed Mobility, Assessment/Treatment    Bed Mobility, Assistive Device  bed rails;head of bed elevated  -CZ     Bed Mobility, Roll Left, [place]  maximum assist (25% patient effort)  -CZ     Bed Mob, Supine to Sit, [place]  maximum assist (25% patient effort)  -CZ     Bed Mobility, Comment  caregiver and staff performed transfer with SPV from PT.   -CZ     Recorded by  [CZ] Seth Rice, PT     Transfer Assessment/Treatment    Transfers, Bed-Chair [place]  maximum assist (25% patient effort)  -CZ     Transfer, Comment  Transferred toward R to allow patient support on bed rail with stronger LUE.   -CZ     Recorded by  [CZ] Seth Rice, PT     Positioning and Restraints    Pre-Treatment Position in bed  -EK in bed  -CZ in bed  -EK    Post Treatment Position bed  -EK chair  -CZ bed  -EK    In Bed encouraged to call for assist;call light within reach;exit alarm on  -EK supine;with  family/caregiver  -CZ call light within reach;exit alarm on;with family/caregiver  -EK    Recorded by [EK] MARIN MoratayaSLP [CZ] Seth Rice, PT [EK] MOHSEN Morataya      02/11/18 0808          Dysphagia Treatment Objectives and Progress    Dysphagia Treatment Objectives Improve timing of pharyngeal response;Other 1  -EK      Recorded by [EK] MOHSEN Morataya      Improve timing of pharyngeal response    To improve timing of pharyngeal response, patient will: Swallow in timely way after sensory input;90%  -EK      Status: Improve timing of pharyngeal response Progressing as expected  -EK      Timing of Pharyngeal Response Progress 80%  -EK      Recorded by [EK] MOHSEN Morataya      Dysphagia Other 1    Dysphagia Other 1 Objective pt will tolerate recommended diet with no s/s of aspiration  -EK      Status: Dysphagia Other 1 Progressing as expected  -EK      Dysphagia Other 1 Progress 80%  -EK      Comments: Dysphagia Other 1 SLP trialed bread based on pt/family request. Pt will allow bread to soften and then swallow. Pt displays difficulty with soft items and will expectorate from oral cavity.   -EK      Recorded by [EK] MOHSEN Morataya      Positioning and Restraints    Pre-Treatment Position in bed  -EK      Post Treatment Position bed  -EK      In Bed call light within reach;encouraged to call for assist;exit alarm on;patient within staff view;with family/caregiver  -EK      Recorded by [EK] MOHSEN Morataya        User Key  (r) = Recorded By, (t) = Taken By, (c) = Cosigned By    Initials Name Effective Dates    EK MOHSEN Morataya 04/06/17 -     CZ Seth Rice, PT 02/17/17 -                   IP SLP Goals       02/13/18 1431 02/12/18 1430 02/11/18 1237    Safely Consume Diet    Safely Consume Diet- SLP, Date Goal Reviewed 02/13/18  -EK 02/12/18  -EK 02/11/18  -EK       02/10/18 1354 02/09/18 1009       Safely Consume Diet    Safely Consume Diet- SLP, Date Established  02/09/18  -EC     Safely Consume Diet- SLP, Time to Achieve  by discharge  -EC     Safely Consume Diet- SLP, Activity Level  Patient will improve timing of pharyngeal response for safer, more efficient swallow  -EC     Safely Consume Diet- SLP, Date Goal Reviewed 02/10/18  -EK 02/09/18  -EC     Safely Consume Diet- SLP, Outcome goal not met  -EK        User Key  (r) = Recorded By, (t) = Taken By, (c) = Cosigned By    Initials Name Provider Type    MOHSEN Poole Speech and Language Pathologist    MOHSEN Chavis Speech and Language Pathologist          EDUCATION  The patient has been educated in the following areas:   Dysphagia (Swallowing Impairment).    SLP Recommendation and Plan            Plan of Care Review  Plan Of Care Reviewed With: patient  Progress: improving  Outcome Summary/Follow up Plan: Pt tolerating puree and honey thick liquids with cues to double swallow, small sips/bites, and  peform good oral care after meals.            Time Calculation:         Time Calculation- SLP       02/13/18 1439          Time Calculation- SLP    SLP Start Time 0800  -EK      SLP Stop Time 0843  -EK      SLP Time Calculation (min) 43 min  -EK      SLP Received On 02/13/18  -EK        User Key  (r) = Recorded By, (t) = Taken By, (c) = Cosigned By    Initials Name Provider Type    MOHSEN Chavis Speech and Language Pathologist          Therapy Charges for Today     Code Description Service Date Service Provider Modifiers Qty    79110392912 HC ST TREATMENT SWALLOW 4 2/12/2018 MOHSEN Morataya GN 1    [id number] HC ST TREATMENT SWALLOW 3 2/13/2018 MOHSEN Morataya GN 1          SLP G-Codes  Functional Limitations: Swallowing  Swallow Current Status (): At least 60 percent but less than 80 percent impaired,  limited or restricted  Swallow Goal Status (): At least 60 percent but less than 80 percent impaired, limited or restricted  Swallow Discharge Status (): At least 40 percent but less than 60 percent impaired, limited or restricted      Magaly Deleon CCC-SLP  2/13/2018

## 2018-02-13 NOTE — THERAPY EVALUATION
Acute Care - Occupational Therapy Initial Evaluation  [facility]     Patient Name: Isabel Barry  : 1924  MRN: [id number]  Today's Date: 2018  Onset of Illness/Injury or Date of Surgery Date: 18  Date of Referral to OT: 18  Referring Physician: Shamar DE JESUS    Admit Date: 2018       ICD-10-CM ICD-9-CM   1. Closed subcapital fracture of right femur, initial encounter S72.011A 820.09   2. Hip fracture, right, closed, initial encounter S72.001A 820.8   3. Oropharyngeal dysphagia R13.12 787.22   4. Impaired physical mobility Z74.09 781.99   5. Impaired mobility and activities of daily living Z74.09 799.89     Patient Active Problem List   Diagnosis   • Acquired hypothyroidism   • Cerebrovascular accident   • Coronary arteriosclerosis   • Essential hypertension   • Gastroesophageal reflux disease   • Hyperlipidemia   • Anxiety state   • Hip fracture, right, closed, initial encounter     Past Medical History:   Diagnosis Date   • Acquired coagulation factor inhibitor disorder    • Acquired hypothyroidism    • Acute bronchitis    • Anxiety state    • Cerebrovascular accident     with mild right weakness   • Coronary arteriosclerosis    • Dizziness and giddiness    • Edema     med related   • Encounter for immunization    • Essential hypertension    • Foot pain    • Gastroesophageal reflux disease    • Hematoma     right foot   • Hemiplegia and hemiparesis following cerebral infarction affecting right dominant side    • Hemoptysis    • Hyperlipidemia    • Hyponatremia    • Impacted cerumen    • Inflamed seborrheic keratosis    • Need for immunization against influenza    • Need for prophylactic vaccination and inoculation against influenza    • Nonexudative age-related macular degeneration    • Nuclear senile cataract     L>R   • Osteoarthritis    • Osteoporosis    • Pain in right hip    • Plantar fasciitis    • Upper respiratory infection    • Urinary tract infectious disease       Past Surgical History:   Procedure Laterality Date   • ABDOMINAL HERNIA REPAIR     • APPENDECTOMY     • CHOLECYSTECTOMY     • CORONARY ARTERY BYPASS GRAFT      CABG, arterial, two (1)      • HIP PERCUTANEOUS PINNING Right 2/9/2018    Procedure: HIP PERCUTANEOUS PINNING;  Surgeon: Truong Salmon MD;  Location: [facility];  Service:    • OTHER SURGICAL HISTORY  09/27/2013    Inj(s) Tend-Sheath, Ligament, Single 82459 (1)             OT ASSESSMENT FLOWSHEET (last 72 hours)      OT Evaluation       02/13/18 1521 02/13/18 1400 02/13/18 0935 02/13/18 0800 02/12/18 1520    Rehab Evaluation    Document Type evaluation  -RB therapy note (daily note)  -AM therapy note (daily note)  -AM therapy note (daily note)  -EK therapy note (daily note)  -CZ    Subjective Information agree to therapy  -RB agree to therapy;no complaints  -AM agree to therapy;no complaints  -AM no complaints;agree to therapy  -EK agree to therapy;complains of;fatigue  -CZ    Patient Effort, Rehab Treatment good  -RB fair  -AM fair  -AM good  -EK adequate  -CZ    Symptoms Noted During/After Treatment significant change in vital signs  -RB fatigue  -AM fatigue  -AM fatigue  -EK fatigue  -CZ    General Information    Patient Profile Review yes  -RB        Onset of Illness/Injury or Date of Surgery Date 02/08/18  -RB        Referring Physician Shamar DE JESUS  -RB        General Observations Sitting in recliner reclined with IV, 2 L nasal canula, mckee cathiter and telemetry.  -RB        Pertinent History Of Current Problem R hip fx due to fall with ORIF.  -RB        Precautions/Limitations fall precautions;oxygen therapy device and L/min   Touch down weight bearing.  -RB fall precautions;oxygen therapy device and L/min;other (see comments)   TTWB RLE  -AM fall precautions;oxygen therapy device and L/min;other (see comments)   TTWB RLE  -AM      Prior Level of Function independent:;w/c or scooter;min assist:;transfer;bed mobility  -RB         Equipment Currently Used at Home commode;wheelchair, motorized;wheelchair;hospital bed  -RB        Plans/Goals Discussed With patient  -RB        Risks Reviewed patient:  -RB        Barriers to Rehab medically complex;previous functional deficit  -RB        Living Environment    Lives With alone;other (see comments)   24 hr sitters  -RB        Living Arrangements house   Plan for NH placement.  -RB        Living Environment Comment Uses scooter during day.  -RB        Clinical Impression    Date of Referral to OT 02/12/18  -RB        OT Diagnosis Impaired mobility and ADLs.  -RB        Functional Level At Time Of Evaluation Impaired mobility and ADLs.  -RB        Impairments Found (describe specific impairments) gait, locomotion, and balance;ROM;muscle performance  -RB        Criteria for Skilled Therapeutic Interventions Met yes;treatment indicated  -RB        Rehab Potential fair, will monitor progress closely   to guarded.  -RB        Therapy Frequency --   3-14x/wk  -RB        Predicted Duration of Therapy Intervention (days/wks) Until D/C.  -RB        Anticipated Discharge Disposition skilled nursing facility;home with 24/7 care  -RB        Functional Level Prior    Ambulation 3-->assistive equipment and person  -RB        Transferring 3-->assistive equipment and person  -RB        Toileting 3-->assistive equipment and person  -RB        Bathing 2-->assistive person  -RB        Dressing 2-->assistive person  -RB        Eating 2-->assistive person  -RB        Communication 0-->understands/communicates without difficulty  -RB        Vital Signs    Pre Systolic BP Rehab 200  -  -AM       Pre Treatment Diastolic BP 80  -RB 75  -AM       Post Systolic BP Rehab 159  -  -AM       Post Treatment Diastolic BP 73  -RB 77  -AM       Pretreatment Heart Rate (beats/min) 70  -RB 83  -AM       Posttreatment Heart Rate (beats/min) 81  -RB 72  -AM       Pre SpO2 (%) 91  -RB 93  -AM       O2 Delivery Pre Treatment  supplemental O2  -RB room air  -AM       Intra SpO2 (%)     90  -CZ    O2 Delivery Intra Treatment     supplemental O2  -CZ    Post SpO2 (%) 90  -RB 92  -AM       O2 Delivery Post Treatment supplemental O2  -RB room air  -AM       Pre Patient Position Sitting  -RB Side Lying  -AM Sitting  -AM      Intra Patient Position Standing  -RB Standing  -AM Standing  -AM  Sitting  -CZ    Post Patient Position Supine  -RB Sitting  -AM Side Lying  -AM      Pain Assessment    Pain Assessment No/denies pain  -RB No/denies pain  -AM No/denies pain  -AM No/denies pain  -EK No/denies pain  -CZ    Vision Assessment/Intervention    Visual Impairment WFL  -RB        Cognitive Assessment/Intervention    Current Cognitive/Communication Assessment functional  -RB functional  -AM functional  -AM impaired  -EK impaired  -CZ    Orientation Status oriented x 4  -RB oriented to;person  -AM oriented to;person  -AM oriented to;person;place  -EK     Follows Commands/Answers Questions 75% of the time  -RB able to follow single-step instructions;needs cueing;needs increased time;needs repetition  -AM able to follow single-step instructions;needs cueing;needs increased time;needs repetition  -AM 75% of the time  -EK     Personal Safety Interventions gait belt;nonskid shoes/slippers when out of bed;supervised activity  -RB gait belt;nonskid shoes/slippers when out of bed;supervised activity  -AM gait belt;nonskid shoes/slippers when out of bed;supervised activity  -AM      ROM (Range of Motion)    General ROM upper extremity range of motion deficits identified  -RB lower extremity range of motion deficits identified  -AM lower extremity range of motion deficits identified  -AM      General ROM Detail R UE moderate tone with hand contractures and L UE WFL.  -RB        MMT (Manual Muscle Testing)    General MMT Assessment upper extremity strength deficits identified  -RB        General MMT Assessment Detail 1/5 with R UE and L shld flex 3/5 and rest of  R UE 4 to 4-/5.  -RB        Mobility Assessment/Training    Extremity Weight-Bearing Status  right lower extremity  -AM right lower extremity  -AM      Right Lower Extremity Weight-Bearing  touch down weight-bearing  -AM touch down weight-bearing  -AM      Bed Mobility, Assessment/Treatment    Bed Mobility, Assistive Device  head of bed elevated  -AM head of bed elevated  -AM  bed rails;head of bed elevated  -CZ    Bed Mobility, Roll Left, [place]  not tested  -AM not tested  -AM  maximum assist (25% patient effort)  -CZ    Bed Mobility, Roll Right, [place]  not tested  -AM not tested  -AM      Bed Mobility, Scoot/Bridge, [place]  not tested  -AM not tested  -AM      Bed Mob, Supine to Sit, [place]  maximum assist (25% patient effort);2 person assist required  -AM maximum assist (25% patient effort);2 person assist required  -AM  maximum assist (25% patient effort)  -CZ    Bed Mob, Sit to Supine, [place] maximum assist (25% patient effort);2 person assist required  -RB maximum assist (25% patient effort);2 person assist required  -AM maximum assist (25% patient effort);2 person assist required  -AM      Bed Mob, Sidelying to Sit, [place]  not tested  -AM not tested  -AM      Bed Mob, Sit to Sidelying, [place]  not tested  -AM not tested  -AM      Bed Mobility, Safety Issues  cognitive deficits limit understanding;decreased use of arms for pushing/pulling;decreased use of legs for bridging/pushing  -AM cognitive deficits limit understanding;decreased use of arms for pushing/pulling;decreased use of legs for bridging/pushing  -AM      Bed Mobility, Impairments  ROM decreased;strength decreased  -AM ROM decreased;strength decreased  -AM      Bed Mobility, Comment     caregiver and staff performed transfer with SPV from PT.   -CZ    Transfer Assessment/Treatment    Transfers, Bed-Chair [place]  maximum assist (25% patient effort);2 person assist required  -AM maximum assist (25%  patient effort);2 person assist required  -AM  maximum assist (25% patient effort)  -CZ    Transfers, Chair-Bed [place] maximum assist (25% patient effort);1 person + 1 person to manage equipment;2 person assist required  -RB maximum assist (25% patient effort);2 person assist required  -AM maximum assist (25% patient effort);2 person assist required  -AM      Transfers, Bed-Chair-Bed, Assist Device other (see comments)   gait belt and H/H assist.  -RB other (see comments)   HHA  -AM other (see comments)   HHA  -AM      Transfers, Sit-Stand [place] maximum assist (25% patient effort);1 person + 1 person to manage equipment;2 person assist required  -RB maximum assist (25% patient effort);2 person assist required  -AM maximum assist (25% patient effort);2 person assist required  -AM      Transfers, Stand-Sit [place] maximum assist (25% patient effort);1 person + 1 person to manage equipment;2 person assist required  -RB maximum assist (25% patient effort);2 person assist required  -AM maximum assist (25% patient effort);2 person assist required  -AM      Transfers, Sit-Stand-Sit, Assist Device other (see comments)   H/H assist.  -RB other (see comments)   HHA  -AM other (see comments)   HHA  -AM      Toilet Transfer, [place]  not tested  -AM not tested  -AM      Walk-In Shower Transfer, [place]  not tested  -AM not tested  -AM      Bathtub Transfer, [place]  not tested  -AM not tested  -AM      Transfer, Maintain Weight Bearing Status  unable to maintain weight bearing status;assist to maintain weight bearing status;cues to maintain weight bearing status  -AM unable to maintain weight bearing status;assist to maintain weight bearing status;cues to maintain weight bearing status  -AM      Transfer, Impairments  ROM decreased;strength decreased  -AM ROM decreased;strength decreased  -AM      Transfer, Comment     Transferred toward R to allow patient support on bed rail with stronger LUE.    -CZ    Stairs Assessment/Treatment    Stairs, [place] Level  not tested  -AM not tested  -AM      Sensory Assessment/Intervention    Light Touch RUE;LUE  -RB        LUE Light Touch WNL  -RB        RUE Light Touch mild impairment  -RB        General Therapy Interventions    Planned Therapy Interventions activity intolerance;ADL retraining;balance training;bed mobility training;strengthening;transfer training  -RB        Activity Intolerance fair  -RB        Positioning and Restraints    Pre-Treatment Position sitting in chair/recliner  -RB in bed  -AM bedside commode  -AM in bed  -EK in bed  -CZ    Post Treatment Position bed  -RB chair  -AM bed  -AM bed  -EK chair  -CZ    In Bed supine;call light within reach;with family/caregiver  -RB  side lying right;call light within reach;encouraged to call for assist;exit alarm on;with family/caregiver  -AM encouraged to call for assist;call light within reach;exit alarm on  -EK supine;with family/caregiver  -CZ    In Chair  reclined;call light within reach;encouraged to call for assist;with family/caregiver;legs elevated  -AM       General LE Assessment    ROM  RLE ROM was WFL  -AM RLE ROM was WFL  -AM        02/12/18 1326 02/12/18 0902 02/12/18 0808 02/11/18 0808       Rehab Evaluation    Document Type  evaluation  -CZ therapy note (daily note)  -EK      Subjective Information  agree to therapy  -CZ no complaints;agree to therapy  -EK      Evaluation Not Performed other (see comments)  -BH        Patient Effort, Rehab Treatment  good  -CZ good  -EK      Symptoms Noted During/After Treatment  fatigue  -CZ fatigue  -EK      Symptoms Noted Comment  Caregiver and daughter present.  -CZ       General Information    Patient Profile Review  yes  -CZ       Onset of Illness/Injury or Date of Surgery Date  02/08/18  -CZ       Referring Physician  MIKHAIL Salmon MD.  -CZ       General Observations  Supine in bed, receiving meds from nurse, alert, cooperative, O2, mckee  catheter; MARCELINO hose applied.  -CZ       Pertinent History Of Current Problem  To ED after fall at home, R hip fracture: repaired with ORIF, TDWB.(R hemiparesis from previous CVA.)  -CZ       Precautions/Limitations  fall precautions;oxygen therapy device and L/min  -CZ fall precautions  -EK      Prior Level of Function  independent:;w/c or scooter;min assist:;transfer;bed mobility  -CZ       Equipment Currently Used at Home  hospital bed;commode;wheelchair, motorized;wheelchair  -CZ       Plans/Goals Discussed With  patient and family  -CZ       Benefits Reviewed  patient and family:;improve function;increase independence;increase strength;increase balance  -CZ       Barriers to Rehab  previous functional deficit;language barrier  -CZ       Living Environment    Lives With  other (see comments)   24/7 caregivers  -CZ       Living Arrangements  house  -CZ       Home Accessibility  no concerns  -CZ       Living Environment Comment  uses scooter during day.  -CZ       Vital Signs    Pre Systolic BP Rehab  158  -CZ       Pre Treatment Diastolic BP  70  -CZ       Pretreatment Heart Rate (beats/min)  70  -CZ       Posttreatment Heart Rate (beats/min)  67  -CZ       Pre SpO2 (%)  81  -CZ       O2 Delivery Pre Treatment  room air  -CZ       Intra SpO2 (%)  94  -CZ       O2 Delivery Intra Treatment  supplemental O2   3 LPM  -CZ       Post SpO2 (%)  98  -CZ       O2 Delivery Post Treatment  supplemental O2  -CZ       Pre Patient Position  Supine  -CZ       Post Patient Position  Supine  -CZ       Pain Assessment    Pain Assessment  0-10  -CZ       Pain Score  1   indicates discomfort, unable to quantify; not limiting.  -CZ       Post Pain Score  0  -CZ       Pain Type  Surgical pain  -CZ       Pain Location  Hip  -CZ       Pain Orientation  Right  -CZ       Pain Intervention(s)  Medication (See MAR);Repositioned;Distraction  -CZ       Vision Assessment/Intervention    Visual Impairment  WFL  -CZ       Cognitive  Assessment/Intervention    Current Cognitive/Communication Assessment  impaired  -CZ impaired  -EK      Orientation Status  oriented to;person  -CZ oriented to;person  -EK      Follows Commands/Answers Questions  75% of the time  -CZ 75% of the time  -EK      Personal Safety  moderate impairment  -CZ moderate impairment  -EK      Personal Safety Interventions  fall prevention program maintained;muscle strengthening facilitated;nonskid shoes/slippers when out of bed  -CZ       ROM (Range of Motion)    General ROM  lower extremity range of motion deficits identified  -CZ       General ROM Detail  BLEs: hip/knee flexion contractures.  -CZ       MMT (Manual Muscle Testing)    General MMT Assessment  lower extremity strength deficits identified  -CZ       General MMT Assessment Detail  RLE: 2+/5, LLE: 3-/5  -CZ       Bed Mobility, Assessment/Treatment    Bed Mobility, Assistive Device  bed rails;head of bed elevated  -CZ       Bed Mobility, Roll Left, [place]  maximum assist (25% patient effort);2 person assist required  -CZ       Bed Mobility, Comment  Poor seated balance.   -CZ       Transfer Assessment/Treatment    Transfers, Bed-Chair [place]  maximum assist (25% patient effort)  -CZ       Transfers, Chair-Bed [place]  maximum assist (25% patient effort)  -CZ       Transfers, Bed-Chair-Bed, Assist Device  bed rails  -CZ       Positioning and Restraints    Pre-Treatment Position  in bed  -CZ in bed  -EK in bed  -EK     Post Treatment Position  chair  -CZ bed  -EK bed  -EK     In Bed   call light within reach;exit alarm on;with family/caregiver  -EK call light within reach;encouraged to call for assist;exit alarm on;patient within staff view;with family/caregiver  -EK     In Chair  reclined;with family/caregiver;legs elevated  -CZ         User Key  (r) = Recorded By, (t) = Taken By, (c) = Cosigned By    Initials Name Effective Dates    NO Connell, OT 06/15/16 -     EK Magaly Deleon,  CCC-SLP 04/06/17 -      Fatuma Sanchez, OTR/L 10/17/16 -     AM Augie Vanegas, PTA 10/17/16 -     CZ Seth Rice, PT 02/17/17 -            Occupational Therapy Education     Title: PT OT SLP Therapies (Active)     Topic: Occupational Therapy (Active)     Point: Precautions (Done)    Description: Instruct learner(s) on prescribed precautions during self-care and functional transfers.    Learning Progress Summary    Learner Readiness Method Response Comment Documented by Status   Patient Acceptance E VU,NR Edu pt / caregiver on use of gait belt  and non skid socks when OOB. RB 02/13/18 1655 Done   Caregiver Acceptance E VU,NR Edu pt / caregiver on use of gait belt  and non skid socks when OOB. RB 02/13/18 1655 Done                      User Key     Initials Effective Dates Name Provider Type Discipline     06/15/16 -  Joselo Connell, OT Occupational Therapist OT                  OT Recommendation and Plan  Anticipated Discharge Disposition: skilled nursing facility, home with 24/7 care  Planned Therapy Interventions: activity intolerance, ADL retraining, balance training, bed mobility training, strengthening, transfer training  Therapy Frequency:  (3-14x/wk)  Plan of Care Review  Plan Of Care Reviewed With: patient  Outcome Summary/Follow up Plan: OT eval complete on this date.  Pt required max x 1-2 for transfer chair to bed.  Max A for bed mobility.  Pt may benefit from OT services to increase L UE str to assist with bed mobility and sitting balance.          OT Goals       02/13/18 1659          Transfer Training OT LTG    Transfer Training OT LTG, Date Established 02/13/18  -RB      Transfer Training OT LTG, Time to Achieve by discharge  -RB      Transfer Training OT LTG, Activity Type bed to chair /chair to bed  -RB      Transfer Training OT LTG, [place] Level moderate assist (50% patient effort);other (see comments)   of 1-2 with gait belt.  -RB      Strength OT LTG    Strength Goal OT LTG, Date  Established 02/13/18  -RB      Strength Goal OT LTG, Time to Achieve by discharge  -RB      Strength Goal OT LTG, Measure to Achieve 1-2 sets of 10 reps all planes with 1/2  to 1 lb wrist wt or dumbell for L UE strengthening to increase independence with bed mobility and transfers  -RB      Dynamic Sitting Balance OT LTG    Dynamic Sitting Balance OT LTG, Date Established 02/13/18  -RB      Dynamic Sitting Balance OT LTG, Time to Achieve by discharge  -RB      Dynamic Sitting Balance OT LTG, [place] Level contact guard assist;supervision required   5 - 10 minutes with functional activity.  -RB      Dynamic Sitting Balance OT LTG, Assist Device bed rails  -RB      Caregiver Training OT LTG    Caregiver Training OT LTG, Date Established 02/13/18  -RB      Caregiver Training OT LTG, Time to Achieve by discharge  -RB      Caregiver Training OT LTG, Activity Type Transfer with gait belt and touch down weight bearing on R LE.  -RB      Caregiver Training OT LTG, [place] Level able to assist adequately  -RB        User Key  (r) = Recorded By, (t) = Taken By, (c) = Cosigned By    Initials Name Provider Type    NO Connell, OT Occupational Therapist                Outcome Measures       02/13/18 1521 02/13/18 1400 02/13/18 0935    How much help from another person do you currently need...    Turning from your back to your side while in flat bed without using bedrails?  2  -AM 2  -AM    Moving from lying on back to sitting on the side of a flat bed without bedrails?  2  -AM 2  -AM    Moving to and from a bed to a chair (including a wheelchair)?  2  -AM 2  -AM    Standing up from a chair using your arms (e.g., wheelchair, bedside chair)?  2  -AM 2  -AM    Climbing 3-5 steps with a railing?  1  -AM 1  -AM    To walk in hospital room?  1  -AM 1  -AM    AM-PAC 6 Clicks Score  10  -AM 10  -AM    How much help from another is currently needed...    Putting on and taking off regular lower body clothing? 1  -RB       Bathing (including washing, rinsing, and drying) 1  -RB      Toileting (which includes using toilet bed pan or urinal) 1  -RB      Putting on and taking off regular upper body clothing 1  -RB      Taking care of personal grooming (such as brushing teeth) 2  -RB      Eating meals 2  -RB      Score 8  -RB      Functional Assessment    Outcome Measure Options AM-PAC 6 Clicks Daily Activity (OT)  -RB AM-PAC 6 Clicks Basic Mobility (PT)  -AM AM-PAC 6 Clicks Basic Mobility (PT)  -AM      02/12/18 0902          How much help from another person do you currently need...    Turning from your back to your side while in flat bed without using bedrails? 1  -CZ      Moving from lying on back to sitting on the side of a flat bed without bedrails? 1  -CZ      Moving to and from a bed to a chair (including a wheelchair)? 1  -CZ      Standing up from a chair using your arms (e.g., wheelchair, bedside chair)? 1  -CZ      Climbing 3-5 steps with a railing? 1  -CZ      To walk in hospital room? 1  -CZ      AM-PAC 6 Clicks Score 6  -CZ      Functional Assessment    Outcome Measure Options AM-PAC 6 Clicks Basic Mobility (PT)  -CZ        User Key  (r) = Recorded By, (t) = Taken By, (c) = Cosigned By    Initials Name Provider Type    RB Joselo Connell OT Occupational Therapist    JASON Vanegas PTA Physical Therapy Assistant    CZ Seth Rice, PT Physical Therapist          Time Calculation:   OT Start Time: 1521  OT Stop Time: 1545  OT Time Calculation (min): 24 min    Therapy Charges for Today     Code Description Service Date Service Provider Modifiers Qty    [id number]  OT SELFCARE CURRENT 2/13/2018 Joselo Connell OT GO, CM 1    [id number] HC OT SELFCARE PROJECTED 2/13/2018 Joselo Connell OT GO, CL 1    [id number]  OT EVAL MOD COMPLEXITY 2 2/13/2018 Joselo Connell OT GO 1          OT G-codes  OT Professional Judgement Used?: Yes  OT Functional Scales Options: AM-PAC 6 Clicks Daily Activity (OT)  Score: 8  Functional  Limitation: Self care  Self Care Current Status (): At least 80 percent but less than 100 percent impaired, limited or restricted  Self Care Goal Status (): At least 60 percent but less than 80 percent impaired, limited or restricted    Joselo Connell OT  2/13/2018

## 2018-02-13 NOTE — PROGRESS NOTES
[facility] Medicine Services  INPATIENT PROGRESS NOTE    Length of Stay: 5  Date of Admission: 2/8/2018  Primary Care Physician: Josseline Lentz MD    Subjective   Please note that all previous progress notes, lab findings, radiographical findings, medication changes, and physical exam findings have been noted and updated as appropriate.    2/13/2018: Patient still being treated for pneumonia, respiratory status improving.  Will begin oxygen weaning.  Have discussed with orthopedics, patient has high risk of fall.  Will forego any anticoagulation with Coumadin or similar products.  Will treat the patient with full strength aspirin.  Orthopedics is agreeable.  Patient hemoglobin and hematocrit have been trending downward.  Part of this could be postop blood loss and chronic disease as well as iron deficiency.  Will order anemia labs and follow as appropriate.  Urinalysis still pending, gram-negative.  On ceftriaxone.  Lovenox has been DC'd.    2/12/2018: Chest x-ray demonstrates a subtle pneumonia.  Patient has been started on doxycycline and ceftriaxone.  Patient continues to be hypoxic, does much better on oxygen.  She did remove her oxygen earlier today and dropped into the 80s.  Patient and family was educated.  Patient states she is in no pain and feels much better.  Case management will see today to begin planning for discharge and rehabilitation.  Lovenox has been ordered as hospital DVT prophylaxis.  Will discuss with orthopedics the risk versus benefit of long-term anticoagulation with Coumadin.    Patient also demonstrated an incidental finding of a gram-negative bacilli urinary tract infection.  This will likely be covered by ceftriaxone give it is most likely an Escherichia coli infection.  We will continue to monitor cultures and sensitivity.    2/11/2018:  Patient has no complaints, good PO intake.  Remains mildly hypoxic on nasal cannula.  Unable to perform  correct spirometry due to residual weakness/facial droop from stroke.  No fever, chills, or chest pain.  Daughter wants some type of rehab placement.    2/10/2018: Patient currently resting comfortably.  No complaints.  Low-grade fever overnight, postsurgical.  Has completely resolved.  No nausea or vomiting, no chest pain or shortness of air.  We'll defer to orthopedics for mobility and PT/OT.  Encourage incentive spirometry.    Chief Complaint/HPI:  This 89-year-old  female experienced a mechanical fall at home resulting in right hip fracture.  Patient is to undergo repair with Dr. Salmon of orthopedics today.  She currently has no complaints of pain and is resting comfortably.    Review of Systems   Constitutional: Negative for chills and fever.   Respiratory: Negative for shortness of breath.    Cardiovascular: Negative for chest pain.   Gastrointestinal: Negative for abdominal pain, nausea and vomiting.   Neurological: Negative for headaches.   Psychiatric/Behavioral: Negative for confusion.      All pertinent negatives and positives are as above. All other systems have been reviewed and are negative unless otherwise stated.     Objective    Temp:  [97.1 °F (36.2 °C)-98.4 °F (36.9 °C)] 97.1 °F (36.2 °C)  Heart Rate:  [64-83] 83  Resp:  [18] 18  BP: (130-158)/(60-75) 153/70    Physical Exam   Constitutional: She is oriented to person, place, and time. No distress.   HENT:   Head: Normocephalic and atraumatic.   Cardiovascular: Normal rate and regular rhythm.    Pulmonary/Chest: Effort normal. No respiratory distress. She has rales.   Abdominal: Soft. She exhibits no distension. There is no tenderness.   Neurological: She is alert and oriented to person, place, and time.   Skin: Skin is warm and dry. She is not diaphoretic.   Psychiatric: She has a normal mood and affect. Her behavior is normal.       Distal pulses intact, capillary refill within 2 seconds.     Results Review:  I have reviewed the  labs, radiology results, and diagnostic studies.    Laboratory Data:     Results from last 7 days  Lab Units 02/12/18 0611 02/11/18 0538 02/09/18 0536 02/08/18 2020   SODIUM mmol/L 141 140 138 133*   POTASSIUM mmol/L 3.6 3.7 4.4 4.1   CHLORIDE mmol/L 110 109 106 103   CO2 mmol/L 24.0 23.0 27.0 23.0   BUN mg/dL 12 13 13 14   CREATININE mg/dL 0.76 0.81 0.86 0.84   GLUCOSE mg/dL 78 76 100 87   CALCIUM mg/dL 8.1* 8.3* 8.7 9.1   BILIRUBIN mg/dL 0.2  --   --  0.3   ALK PHOS U/L 76  --   --  100   ALT (SGPT) U/L 24  --   --  36   AST (SGOT) U/L 23  --   --  27   ANION GAP mmol/L 7.0 8.0 5.0 7.0     Estimated Creatinine Clearance: 42.1 mL/min (by C-G formula based on Cr of 0.76).            Results from last 7 days  Lab Units 02/12/18 0611 02/11/18 0538 02/09/18 0536 02/08/18 2020   WBC 10*3/mm3 7.93 7.39 12.23* 15.72*   HEMOGLOBIN g/dL 8.4* 8.9* 9.3* 11.0*   HEMATOCRIT % 25.6* 27.5* 27.8* 33.2*   PLATELETS 10*3/mm3 174 156 189 236       Results from last 7 days  Lab Units 02/08/18  2126   INR  0.98       Culture Data:   No results found for: BLOODCX  Urine Culture   Date Value Ref Range Status   02/11/2018 >100,000 CFU/mL Gram Negative Bacilli (A)  Preliminary   02/11/2018 >100,000 CFU/mL Gram Negative Bacilli (A)  Preliminary     No results found for: RESPCX  No results found for: WOUNDCX  No results found for: STOOLCX  No components found for: BODYFLD    Radiology Data:   Imaging Results (last 24 hours)     ** No results found for the last 24 hours. **          I have reviewed the patient current medications.     Assessment/Plan     Hospital Problem List     Hip fracture, right, closed, initial encounter          Plan:   Monitor hemoglobin and hematocrit, continue antibiotics for pneumonia and urinary tract infection, continue physical therapy and occupational therapy, orthopedic consult appreciated.              This document has been electronically signed by NEAL Beauchamp on February 13, 2018 10:34 AM

## 2018-02-14 NOTE — SIGNIFICANT NOTE
02/14/18 1445   Rehab Treatment   Discipline physical therapy assistant   Treatment Not Performed unable to treat, medical status change  (Pt in afib w/RVR - will need restart orders if/when medically appropriate. )   Recommendation   PT - Next Appointment 02/15/18

## 2018-02-14 NOTE — CONSULTS
Adult Nutrition  Assessment    Patient Name:  Isabel Barry  YOB: 1924  MRN: [id number]  Admit Date:  2/8/2018    Assessment Date:  2/14/2018    Comments:  Pt with overall good intake of meals, averaging ~50%.  ? acceptance and intake of magic cups.  She has had some coughing/choking spells with resulting resp distress.  ? Aspiration pneumonia vs Pulmonary embolism.  SLP has also been consulted to re-evaluate pt.  RD will continue to monitor.            Reason for Assessment       02/14/18 1448    Reason for Assessment    Reason For Assessment/Visit follow up protocol                Nutrition/Diet History       02/14/18 1448    Nutrition/Diet History    Typical Food/Fluid Intake Pt sitting up in chair.  Her sitter claims that pt had a coughing spell yeasterday and this morning that was not associated with eating.  She had a great deal of resp distress.  She had a breathing tx and felt better              Labs/Tests/Procedures/Meds       02/14/18 1449    Labs/Tests/Procedures/Meds    Labs/Tests Review Reviewed    Medication Review Reviewed, pertinent;Iron            Physical Findings       02/14/18 1450    Physical Appearance    Overall Physical Appearance on oxygen therapy    Skin pressure ulcer(s)              Nutrition Prescription Ordered       02/14/18 1450    Nutrition Prescription PO    Current PO Diet Pureed    Fluid Consistency Honey thick    Supplement Magic Cup    Supplement Frequency 3 times a day            Evaluation of Received Nutrient/Fluid Intake       02/14/18 1450    PO Evaluation    Number of Days PO Intake Evaluated 2 days    Number of Meals 6    % PO Intake ~50%            Electronically signed by:  Bernadette Jaramillo RD  02/14/18 2:56 PM

## 2018-02-14 NOTE — SIGNIFICANT NOTE
02/14/18 1433   Rehab Treatment   Discipline occupational therapy assistant   Treatment Not Performed other (see comments)  (Pt is off the floor. Pt went to CT.)

## 2018-02-14 NOTE — SIGNIFICANT NOTE
Patient respiratory status continued to worsen.  Patient was treated with an amiodarone drip.  Patient also demonstrated an elevated troponin, indeterminant.  Troponin could be secondary to atrial fibrillation with RVR, respiratory distress, or myocardial infarction.  Discussed with Dr. Mathews.  Roshni COWAN, cardiology, evaluated the patient.  Ms. Dia also evaluated the echocardiogram which demonstrated markedly reduced ejection fraction and overt heart failure.  Hospitalist staff and Ms. Dia discussed all findings with the family in detail as well as all available treatment options.  Family collectively made the decision to make patient comfort measures.  Patient has elected to stop essentially all interventions aside from pain medications and anti-anxiolytics.  Family did not want any further imaging or radiographical interventions, did not want any further antibiotic treatment, and wanted to stop the amiodarone drip.  Amiodarone drip was discussed with Ms. Dia who determined that this would likely not help control symptoms.  At this time administering morphine and Ativan before discontinuing the drip.  Family has requested to be transferred back to their med/surg room on 3 E.  We'll continue to titrate medications as needed.  Have ordered morphine PCA as well as scheduled Ativan.        This document has been electronically signed by NEAL Beauchamp on February 14, 2018 5:48 PM

## 2018-02-14 NOTE — PLAN OF CARE
Problem: Patient Care Overview (Adult)  Goal: Plan of Care Review  Outcome: Ongoing (interventions implemented as appropriate)   02/14/18 1318   Coping/Psychosocial Response Interventions   Plan Of Care Reviewed With patient;caregiver;family   Patient Care Overview   Progress progress towards functional goals is fair   Outcome Evaluation   Outcome Summary/Follow up Plan St therapy provided this morning. pt family and caregivers are at bedside and pt is in recliner at side of bed. pt is more labored in jayy thing and has 4 L nasal cannula. she has discoordination with attempts at swallow of liquids from spoon. pt family educated for r/o aspiration and precautions. pt remains on puree diet and Honey thick liquids       Problem: Inpatient SLP  Goal: Dysphagia- Patient will safely consume diet as per recommendation with no signs/symptoms of aspiration  Outcome: Ongoing (interventions implemented as appropriate)   02/09/18 1009 02/14/18 1318   Safely Consume Diet   Safely Consume Diet- SLP, Date Established --  02/14/18   Safely Consume Diet- SLP, Time to Achieve --  by discharge   Safely Consume Diet- SLP, Activity Level Patient will improve timing of pharyngeal response for safer, more efficient swallow --    Safely Consume Diet- SLP, Date Goal Reviewed --  02/14/18   Safely Consume Diet- SLP, Outcome --  goal ongoing

## 2018-02-14 NOTE — PLAN OF CARE
Problem: Patient Care Overview (Adult)  Goal: Plan of Care Review  Outcome: Ongoing (interventions implemented as appropriate)   02/14/18 2971   Coping/Psychosocial Response Interventions   Plan Of Care Reviewed With caregiver;patient   Patient Care Overview   Progress improving   Outcome Evaluation   Outcome Summary/Follow up Plan Pt with intake averaging ~50%. However, she has had some resp distress and coughing and therefore did not eat this am. SLP continuing to monitor       Problem: Older Inpatient, Acutely Ill (Adult)  Goal: Signs and Symptoms of Listed Potential Problems Will be Absent or Manageable (Older Inpatient, Acutely Ill)  Outcome: Ongoing (interventions implemented as appropriate)

## 2018-02-14 NOTE — PLAN OF CARE
Cardiology consultation requested per Shamar Prabhakar PA-C regarding atrial fibrillation RVR.    Upon arrival to the stepdown unit, the patient is found with blood pressure 134/72 along with atrial fibrillation heart rate  on amiodarone infusion.  The patient is in obvious degree of respiratory distress with bilateral breath sounds: expiratory wheezes and rhonchi throughout.     Family members (inclusive of 10 children) are intermittently present at bedside.    Past/concurrent medical history; presentation on 02/08/2018 with findings of right hip fracture thus undergoing percutaneous hip pinning per Dr. Salmon on 02/09/2018; complicated by postoperative pneumonia.  Family members indicate: coronary artery disease status post coronary artery bypass graft for which she was under the care of Dr. 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    Transthoracic echocardiogram performed with final interpretation present at 1545.  Following provider review of results I then approach family members regarding the need for conversation/discussion.  Shamar Prabhakar PA-C is present for the majority of that discussion..    Transthoracic echocardiogram results presented:  · Left Ventricle: Left ventricular systolic function is severely decreased. Estimated EF = 15%. The left ventricular cavity is moderately dilated  · Left ventricular wall thickness is consistent with hypertrophy. Sigmoid-shaped ventricular septum is present. There is pseudo-normal diastolic dysfunction present.  · Right ventricle: Right ventricle systolic function is mildly decreased. The right ventricular cavity is mildly dilated.  · Moderate to severe aortic valve  regurgitation is present.  · Mild mitral annular calcification is present. Moderate mitral valve regurgitation is present.  · Tricuspid Valve: The tricuspid valve has poor leaflet coaptation and is abnormal with associated moderate to severe tricuspid valve regurgitation.  · Calculated right ventricular systolic pressure from tricuspid regurgitation is 45 mmHg. Mild pulmonary hypertension is present.  · There is moderate-to-severe pulmonic valve regurgitation present.  · There is no evidence of pericardial effusion    I spent time in discussion with the family regarding the above findings for which all questions have been answered.    Following the discussion, the family is in agreement to proceed with comfort measures.    Shamar Prabhakar PA-C will proceed with providing desired level of care.

## 2018-02-14 NOTE — PROGRESS NOTES
[facility] Medicine Services  INPATIENT PROGRESS NOTE    Length of Stay: 6  Date of Admission: 2/8/2018  Primary Care Physician: Josseline Lentz MD    Subjective   Please note that all previous progress notes, lab findings, radiographical findings, medication changes, and physical exam findings have been noted and updated as appropriate.    2/14/2018: Per family patient had a choking event yesterday.  Since then patient's oxygenation has worsened as well as her breathing upon exam.  Patient is using some accessory muscles.  At this time it is suspected that the patient has aspirated.  Ceftriaxone changed to Zosyn to cover atypical and aspiration organisms.  Doxycycline continued.  Speech is reevaluating at this time, suspect barium swallow.  Will continue by mouth or nothing by mouth status per speech recommendations.  Though aspiration is the most likely culprit, given the patient's hypoxia, tachycardia, and recent surgery pulmonary embolism must be considered.  Patient does have an iodine allergy, however patient nor family know the reaction.  Family has asked patient family member, Alyson, to make the decision.  Alyson has asked that we premedicate the patient per radiology protocol and proceed with the CTA.  Alyson does understand the possible risks of exposing the patient iodine, including possible anaphylaxis and death.    Hemoglobin and hematocrit have improved today.  Patient demonstrates iron deficiency, most likely cause of continued anemia along with postop.  Will start IV iron today and monitor H&H carefully.    At this time since we cannot rule out pulmonary embolism and, though it is not the primary suspicion, we will dose with Lovenox for pulmonary embolism treatment dose.  We'll also perform ultrasounds of bilateral lower extremities given patient's recent surgical status.  This dose of Lovenox will last until it is time for the CTA to be performed and  will be renewed if appropriate.    Family is aware of the plan of care.    Patient constipation has resolved, she has had 2 regular bowel movements within the past few hours.    Update 12:44 PM:  Chest x-ray demonstrates opacities versus pulmonary edema.  Will administer 40 mg of Lasix and monitor response.  Additionally microbiology has returned on the patient's urinalysis.  Patient demonstrates Klebsiella and Pseudomonas UTI, both sensitive to Zosyn.  This has been addressed.    2/13/2018: Patient still being treated for pneumonia, respiratory status improving.  Will begin oxygen weaning.  Have discussed with orthopedics, patient has high risk of fall.  Will forego any anticoagulation with Coumadin or similar products.  Will treat the patient with full strength aspirin.  Orthopedics is agreeable.  Patient hemoglobin and hematocrit have been trending downward.  Part of this could be postop blood loss and chronic disease as well as iron deficiency.  Will order anemia labs and follow as appropriate.  Urinalysis still pending, gram-negative.  On ceftriaxone.  Lovenox has been DC'd.    2/12/2018: Chest x-ray demonstrates a subtle pneumonia.  Patient has been started on doxycycline and ceftriaxone.  Patient continues to be hypoxic, does much better on oxygen.  She did remove her oxygen earlier today and dropped into the 80s.  Patient and family was educated.  Patient states she is in no pain and feels much better.  Case management will see today to begin planning for discharge and rehabilitation.  Lovenox has been ordered as hospital DVT prophylaxis.  Will discuss with orthopedics the risk versus benefit of long-term anticoagulation with Coumadin.    Patient also demonstrated an incidental finding of a gram-negative bacilli urinary tract infection.  This will likely be covered by ceftriaxone give it is most likely an Escherichia coli infection.  We will continue to monitor cultures and sensitivity.    2/11/2018:   Patient has no complaints, good PO intake.  Remains mildly hypoxic on nasal cannula.  Unable to perform correct spirometry due to residual weakness/facial droop from stroke.  No fever, chills, or chest pain.  Daughter wants some type of rehab placement.    2/10/2018: Patient currently resting comfortably.  No complaints.  Low-grade fever overnight, postsurgical.  Has completely resolved.  No nausea or vomiting, no chest pain or shortness of air.  We'll defer to orthopedics for mobility and PT/OT.  Encourage incentive spirometry.    Chief Complaint/HPI:  This 89-year-old  female experienced a mechanical fall at home resulting in right hip fracture.  Patient is to undergo repair with Dr. Salmon of orthopedics today.  She currently has no complaints of pain and is resting comfortably.    Review of Systems   Constitutional: Negative for chills and fever.   Respiratory: Positive for cough, choking and shortness of breath.    Cardiovascular: Negative for chest pain.   Gastrointestinal: Negative for abdominal pain, constipation, nausea and vomiting.   Neurological: Negative for headaches.   Psychiatric/Behavioral: Negative for confusion.      All pertinent negatives and positives are as above. All other systems have been reviewed and are negative unless otherwise stated.     Objective    Temp:  [96.6 °F (35.9 °C)-99.5 °F (37.5 °C)] 96.6 °F (35.9 °C)  Heart Rate:  [] 114  Resp:  [18-22] 20  BP: (149-170)/(64-96) 149/96    Physical Exam   Constitutional: She is oriented to person, place, and time. No distress.   HENT:   Head: Normocephalic and atraumatic.   Cardiovascular: Normal rate and regular rhythm.    Pulmonary/Chest: Effort normal. She has rales.   Crackles  Some accessory muscle use   Abdominal: Soft. She exhibits no distension. There is no tenderness.   Neurological: She is alert and oriented to person, place, and time.   Skin: Skin is warm and dry. She is not diaphoretic.   Psychiatric: She has a  normal mood and affect. Her behavior is normal.       Distal pulses intact, capillary refill within 2 seconds.     Results Review:  I have reviewed the labs, radiology results, and diagnostic studies.    Laboratory Data:     Results from last 7 days  Lab Units 02/14/18  0728 02/12/18  0611 02/11/18 0538 02/08/18 2020   SODIUM mmol/L 143 141 140  < > 133*   POTASSIUM mmol/L 3.9 3.6 3.7  < > 4.1   CHLORIDE mmol/L 109 110 109  < > 103   CO2 mmol/L 21.0* 24.0 23.0  < > 23.0   BUN mg/dL 13 12 13  < > 14   CREATININE mg/dL 0.64 0.76 0.81  < > 0.84   GLUCOSE mg/dL 87 78 76  < > 87   CALCIUM mg/dL 8.5 8.1* 8.3*  < > 9.1   BILIRUBIN mg/dL 0.2 0.2  --   --  0.3   ALK PHOS U/L 95 76  --   --  100   ALT (SGPT) U/L 28 24  --   --  36   AST (SGOT) U/L 35 23  --   --  27   ANION GAP mmol/L 13.0 7.0 8.0  < > 7.0   < > = values in this interval not displayed.  Estimated Creatinine Clearance: 42.1 mL/min (by C-G formula based on Cr of 0.64).            Results from last 7 days  Lab Units 02/14/18  0728 02/12/18  0611 02/11/18 0538 02/09/18 0536 02/08/18 2020   WBC 10*3/mm3 8.16 7.93 7.39 12.23* 15.72*   HEMOGLOBIN g/dL 9.3* 8.4* 8.9* 9.3* 11.0*   HEMATOCRIT % 28.6* 25.6* 27.5* 27.8* 33.2*   PLATELETS 10*3/mm3 218 174 156 189 236       Results from last 7 days  Lab Units 02/08/18 2126   INR  0.98       Culture Data:   No results found for: BLOODCX  No results found for: URINECX  No results found for: RESPCX  No results found for: WOUNDCX  No results found for: STOOLCX  No components found for: BODYFLD    Radiology Data:   Imaging Results (last 24 hours)     ** No results found for the last 24 hours. **          I have reviewed the patient current medications.     Assessment/Plan     Hospital Problem List     Hip fracture, right, closed, initial encounter      Acute hypoxic respiratory failure  Pneumonia, possible aspiration, organism unknown  Right-sided residual weakness secondary to previous CVA  Klebsiella urinary tract  infection  Pseudomonas urinary tract infection    2/14/2018: As noted above, premedicate for CTA to rule out pulmonary embolism and better evaluate pneumonia.  Stat chest x-ray to rule out worsening aspiration or fluid overload.  Single dose of empiric Lovenox to cover for pulmonary embolism until this can be confirmed or ruled out.  Speech re-evaluation pending.  Continue per speech recommendations.  Have changed ceftriaxone to Zosyn to better cover aspiration organisms.    Update 10:52 AM: Lovenox has been changed to 2 doses rather than a single dose.  CTA cannot be performed until the a.m. per CT.  2 doses will cover the patient for the next 24 hours until pulmonary embolism can be ruled out oriented.    Update 15:09 p.m.: Patient has developed atrial fibrillation with rapid ventricular response.  We'll moved to stepdown.  Dr. Mathews consulted.  Amiodarone.  Hold bilateral lower extremity ultrasound at this time until patient is stable.  Stat echo.    Plan:   Monitor hemoglobin and hematocrit, continue antibiotics for pneumonia and urinary tract infection, continue physical therapy and occupational therapy, orthopedic consult appreciated.              This document has been electronically signed by NEAL Beauchamp on February 14, 2018 10:24 AM

## 2018-02-14 NOTE — SIGNIFICANT NOTE
02/14/18 1050   Rehab Treatment   Discipline physical therapy assistant   Treatment Not Performed patient/family declined treatment  (Family in room - Pt sitting up in recliner - Family/Sitter requested pt stay in recliner and PTA to check back in pm.)   Recommendation   PT - Next Appointment 02/14/18

## 2018-02-14 NOTE — PROGRESS NOTES
ORTHOPEDIC PROGRESS NOTE:    Name:  Isabel Barry  Date:    2/14/2018  Date of admission:  2/8/2018    Post op day:  5 Days Post-Op  Procedure:    Procedure(s) (LRB):  HIP PERCUTANEOUS PINNING (Right)    Subjective: Continues to have some problems breathing.  Hemoglobin is improved today.    Shortness of breath is better with a breathing treatment.    Vitals:    Vitals:    02/14/18 1100   BP: 152/82   Pulse: 108   Resp: 18   Temp: 98.5 °F (36.9 °C)   SpO2: 99%       Exam: She is alert and oriented, breathing nonlabored this point.      ASSESSMENT:  Hospital Problem List     Hip fracture, right, closed, initial encounter            PLAN: Continue medical support.  Partially she is really not complaining of any hip pain at this point continue supportive care.      Truong Salmon MD  02/14/18  12:22 PM

## 2018-02-14 NOTE — PROGRESS NOTES
TWO PATIENT IDENTIFIERS WERE USED. THE PATIENT WAS DRAPED WITH A FULL BODY DRAPE AND THE PATIENT'S LEFT ARM WAS PREPPED WITH CHLORA PREP. ULTRASOUND WAS USED TO LOCALIZE THELEFT BASILIC VEIN. SUBCUTANEOUS TISSUE AT THE CATHETER SITE WAS INFILTRATED WITH 2% LIDOCAINE. UNDER ULTRASOUND GUIDANCE, THE VEIN WAS ACCESSED WITH A 21 GAUGE  NEEDLE. AN 0.018 WIRE WAS THEN THREADED THROUGH THE NEEDLE. THE 21 GAUGE NEEDLE WAS REMOVED AND A 4 English SHEATH WAS PLACED OVER THE WIRE INTO THE VEIN.THE MIDLINE CATHETER WAS TRIMMED TO 20CM. THE MIDLINE CATHETER WAS THEN PLACED OVER THE WIRE INTO THE VEIN, THE SHEATH WAS PEELED AWAY, WIRE WAS REMOVED. CATHETER WAS FLUSHED WITH NORMAL SALINE AND CATHETER TIP APPLIED. BIOPATCH PLACED. CATHETER SECURED WITH STAT LOCK AND TEGADERM. PATIENT TOLERATED PROCEDURE WELL. THIS WAS DONE IN THE ANGIOSUITE      IMPRESSION:SUCCESSFUL PLACEMENT OF DUAL LUMEN MIDLINE.           Doreen Guallpa  2/14/2018  3:24 PM

## 2018-02-14 NOTE — SIGNIFICANT NOTE
"   02/14/18 4680   Rehab Treatment   Discipline physical therapy assistant   Treatment Not Performed patient/family declined treatment  (Sitter in room - Sitter stated, \"Pt is not doing good right now - another spell w/her breathing. They need to get an IV.\" PTA spoke w/RN.)   Recommendation   PT - Next Appointment 02/15/18     "

## 2018-02-14 NOTE — SIGNIFICANT NOTE
02/14/18 1624   Rehab Treatment   Discipline occupational therapy assistant   Treatment Not Performed unable to treat, medical status change  (Pt in Afib with RVR; will need r/s orders.)

## 2018-02-14 NOTE — THERAPY TREATMENT NOTE
Acute Care - Speech Language Pathology   Swallow Treatment Note [facility]     Patient Name: Isabel Barry  : 1924  MRN: [id number]  Today's Date: 2018  Onset of Illness/Injury or Date of Surgery Date: 18            Admit Date: 2018     1. Pt to swallow in a timely way with po trials with 90% accuracy. 50% accruacy.  Pt breathing  More labored this date and noticeable discoordination with trials of water via spoon.  Recommend no PO at this time.  Family present and understand.    2. Pt to tolerate recommended diet for safe and adequate nutrition/hydration: pt is not able to take PO this morning d/t diff breathing.  4L nasal canula and visible work with breathing.  Pt is at 90% o2.  Family present and education for increased r/o aspiration with diff breathing .  Pt remains on puree diet and HOney thick liquid diet. She took 3 1/2 tsp of honey thick water from spoon with blowback noted on one trial .  Pt declined additional trials.      *slp visited room after noon meal where pt is resting comfortably.  Caregiver present and states pt was able to eat a little of lunch , but meal was discontinued when pt had cough after gelatin dessert.  Pt received breathing treatment and continues on 4L nasal cannula.  SLP will return tomorrow for diet toleration/po trials.alivia Omer, CCC-SLP 2018 1:27 PM    Visit Dx:      ICD-10-CM ICD-9-CM   1. Closed subcapital fracture of right femur, initial encounter S72.011A 820.09   2. Hip fracture, right, closed, initial encounter S72.001A 820.8   3. Oropharyngeal dysphagia R13.12 787.22   4. Impaired physical mobility Z74.09 781.99   5. Impaired mobility and activities of daily living Z74.09 799.89     Patient Active Problem List   Diagnosis   • Acquired hypothyroidism   • Cerebrovascular accident   • Coronary arteriosclerosis   • Essential hypertension   • Gastroesophageal reflux disease   • Hyperlipidemia   • Anxiety state   • Hip fracture,  right, closed, initial encounter             Adult Rehabilitation Note       02/14/18 0940 02/13/18 1400 02/13/18 0935    Rehab Assessment/Intervention    Discipline speech language pathologist  -EC physical therapy assistant  -AM physical therapy assistant  -AM    Document Type therapy note (daily note)  -EC therapy note (daily note)  -AM therapy note (daily note)  -AM    Subjective Information agree to therapy;complains of   diff breathing  -EC agree to therapy;no complaints  -AM agree to therapy;no complaints  -AM    Patient Effort, Rehab Treatment adequate  -EC fair  -AM fair  -AM    Symptoms Noted During/After Treatment  fatigue  -AM fatigue  -AM    Precautions/Limitations  fall precautions;oxygen therapy device and L/min;other (see comments)   TTWB RLE  -AM fall precautions;oxygen therapy device and L/min;other (see comments)   TTWB RLE  -AM    Equipment Issued to Patient  gait belt  -AM gait belt  -AM    Recorded by [EC] Magaly Omer CCC-SLP [AM] Augie Vanegas PTA [AM] Augie Vanegas PTA    Vital Signs    Pre Systolic BP Rehab  173  -AM     Pre Treatment Diastolic BP  75  -AM     Post Systolic BP Rehab  178  -AM     Post Treatment Diastolic BP  77  -AM     Pretreatment Heart Rate (beats/min)  83  -AM     Posttreatment Heart Rate (beats/min)  72  -AM     Pre SpO2 (%)  93  -AM     O2 Delivery Pre Treatment  room air  -AM     Post SpO2 (%)  92  -AM     O2 Delivery Post Treatment  room air  -AM     Pre Patient Position  Side Lying  -AM Sitting  -AM    Intra Patient Position  Standing  -AM Standing  -AM    Post Patient Position  Sitting  -AM Side Lying  -AM    Recorded by  [AM] Augie Vanegas PTA [AM] Augie Vanegas PTA    Pain Assessment    Pain Assessment No/denies pain  -EC No/denies pain  -AM No/denies pain  -AM    Recorded by [EC] Magaly Omer CCC-SLP [AM] Augie Vanegas PTA [AM] Augie Vanegas PTA    Cognitive Assessment/Intervention    Current Cognitive/Communication Assessment   functional  -AM functional  -AM    Orientation Status  oriented to;person  -AM oriented to;person  -AM    Follows Commands/Answers Questions  able to follow single-step instructions;needs cueing;needs increased time;needs repetition  -AM able to follow single-step instructions;needs cueing;needs increased time;needs repetition  -AM    Personal Safety Interventions  gait belt;nonskid shoes/slippers when out of bed;supervised activity  -AM gait belt;nonskid shoes/slippers when out of bed;supervised activity  -AM    Recorded by  [AM] Augie Vanegas PTA [AM] Augie Vanegas PTA    Dysphagia Treatment Objectives and Progress    Dysphagia Treatment Objectives Improve timing of pharyngeal response;Other 1  -EC      Recorded by [EC] Magaly Omer CCC-SLP      Improve timing of pharyngeal response    To improve timing of pharyngeal response, patient will: Swallow in timely way after sensory input;90%  -EC      Status: Improve timing of pharyngeal response Progress slower than expected  -EC      Timing of Pharyngeal Response Progress 50%  -EC      Comments: Improve timing of pharyngeal response delayed swallow noted.  discoordination with swallow attempts poor.    -EC      Recorded by [EC] MOHSEN Ramirez      Dysphagia Other 1    Dysphagia Other 1 Objective pt will tolerate recommended diet with no s/s of aspiration  -EC      Status: Dysphagia Other 1 Progress slower than expected  -EC      Dysphagia Other 1 Progress 30%  -EC      Comments: Dysphagia Other 1 pt unable to eat am meal d/t diff breathing.  pt did take some bites of noon meal.    -EC      Recorded by [EC] MOHSEN Ramirez      ROM (Range of Motion)    General ROM  lower extremity range of motion deficits identified  -AM lower extremity range of motion deficits identified  -AM    Recorded by  [AM] Augie Vanegas PTA [AM] Augie Vanegas PTA    General LE Assessment    ROM  RLE ROM was WFL  -AM RLE ROM was WFL  -AM    Recorded by   [AM] Augie Vanegas PTA [AM] Augie Vanegas PTA    Mobility Assessment/Training    Extremity Weight-Bearing Status  right lower extremity  -AM right lower extremity  -AM    Right Lower Extremity Weight-Bearing  touch down weight-bearing  -AM touch down weight-bearing  -AM    Recorded by  [AM] Augie Vanegas PTA [AM] Augie Vanegas PTA    Bed Mobility, Assessment/Treatment    Bed Mobility, Assistive Device  head of bed elevated  -AM head of bed elevated  -AM    Bed Mobility, Roll Left, [place]  not tested  -AM not tested  -AM    Bed Mobility, Roll Right, [place]  not tested  -AM not tested  -AM    Bed Mobility, Scoot/Bridge, [place]  not tested  -AM not tested  -AM    Bed Mob, Supine to Sit, [place]  maximum assist (25% patient effort);2 person assist required  -AM maximum assist (25% patient effort);2 person assist required  -AM    Bed Mob, Sit to Supine, [place]  maximum assist (25% patient effort);2 person assist required  -AM maximum assist (25% patient effort);2 person assist required  -AM    Bed Mob, Sidelying to Sit, [place]  not tested  -AM not tested  -AM    Bed Mob, Sit to Sidelying, [place]  not tested  -AM not tested  -AM    Bed Mobility, Safety Issues  cognitive deficits limit understanding;decreased use of arms for pushing/pulling;decreased use of legs for bridging/pushing  -AM cognitive deficits limit understanding;decreased use of arms for pushing/pulling;decreased use of legs for bridging/pushing  -AM    Bed Mobility, Impairments  ROM decreased;strength decreased  -AM ROM decreased;strength decreased  -AM    Recorded by  [AM] Augie Vanegas PTA [AM] Augie Vanegas PTA    Transfer Assessment/Treatment    Transfers, Bed-Chair [place]  maximum assist (25% patient effort);2 person assist required  -AM maximum assist (25% patient effort);2 person assist required  -AM    Transfers, Chair-Bed [place]  maximum assist (25% patient effort);2 person  assist required  -AM maximum assist (25% patient effort);2 person assist required  -AM    Transfers, Bed-Chair-Bed, Assist Device  other (see comments)   HHA  -AM other (see comments)   HHA  -AM    Transfers, Sit-Stand [place]  maximum assist (25% patient effort);2 person assist required  -AM maximum assist (25% patient effort);2 person assist required  -AM    Transfers, Stand-Sit [place]  maximum assist (25% patient effort);2 person assist required  -AM maximum assist (25% patient effort);2 person assist required  -AM    Transfers, Sit-Stand-Sit, Assist Device  other (see comments)   HHA  -AM other (see comments)   HHA  -AM    Toilet Transfer, [place]  not tested  -AM not tested  -AM    Walk-In Shower Transfer, [place]  not tested  -AM not tested  -AM    Bathtub Transfer, [place]  not tested  -AM not tested  -AM    Transfer, Maintain Weight Bearing Status  unable to maintain weight bearing status;assist to maintain weight bearing status;cues to maintain weight bearing status  -AM unable to maintain weight bearing status;assist to maintain weight bearing status;cues to maintain weight bearing status  -AM    Transfer, Impairments  ROM decreased;strength decreased  -AM ROM decreased;strength decreased  -AM    Recorded by  [AM] Augie Vanegas PTA [AM] Augie Vanegas PTA    Gait Assessment/Treatment    Gait, [place] Level  not tested  -AM not tested  -AM    Recorded by  [AM] Augie Vanegas PTA [AM] Augie Vanegas PTA    Stairs Assessment/Treatment    Stairs, [place] Level  not tested  -AM not tested  -AM    Recorded by  [AM] Augie Vanegas PTA [AM] Augie Vanegas PTA    Positioning and Restraints    Pre-Treatment Position sitting in chair/recliner  -EC in bed  -AM bedside commode  -AM    Post Treatment Position chair  -EC chair  -AM bed  -AM    In Bed   side lying right;call light within reach;encouraged to call for assist;exit alarm on;with family/caregiver  -AM    In  Chair with family/caregiver;exit alarm on;encouraged to call for assist;call light within reach;sitting  -EC reclined;call light within reach;encouraged to call for assist;with family/caregiver;legs elevated  -AM     Recorded by [EC] Magaly Omer CCC-SLP [AM] Augie Vanegas, PTA [AM] Augie Vanegas, PTA      02/13/18 0800 02/12/18 1520 02/12/18 0808    Rehab Assessment/Intervention    Discipline speech language pathologist  -EK physical therapist  -CZ speech language pathologist  -EK    Document Type therapy note (daily note)  -EK therapy note (daily note)  -CZ therapy note (daily note)  -EK    Subjective Information no complaints;agree to therapy  -EK agree to therapy;complains of;fatigue  -CZ no complaints;agree to therapy  -EK    Patient Effort, Rehab Treatment good  -EK adequate  -CZ good  -EK    Symptoms Noted During/After Treatment fatigue  -EK fatigue  -CZ fatigue  -EK    Precautions/Limitations   fall precautions  -EK    Equipment Issued to Patient  gait belt  -CZ     Recorded by [EK] MOHSEN Morataya [CZ] Seth Rice, PT [EK] MOHSEN Morataya    Vital Signs    Intra SpO2 (%)  90  -CZ     O2 Delivery Intra Treatment  supplemental O2  -CZ     Intra Patient Position  Sitting  -CZ     Recorded by  [CZ] Seth Rice, PT     Pain Assessment    Pain Assessment No/denies pain  -EK No/denies pain  -CZ     Recorded by [EK] MOHSEN Morataya [CZ] Seth Rice, PT     Cognitive Assessment/Intervention    Current Cognitive/Communication Assessment impaired  -EK impaired  -CZ impaired  -EK    Orientation Status oriented to;person;place  -EK  oriented to;person  -EK    Follows Commands/Answers Questions 75% of the time  -EK  75% of the time  -EK    Personal Safety   moderate impairment  -EK    Recorded by [EK] MOHSEN Morataya [CZ] Seth Rice, PT [EK] MOHSEN Morataya    Dysphagia Treatment Objectives  and Progress    Dysphagia Treatment Objectives Improve timing of pharyngeal response;Other 1  -EK  Improve timing of pharyngeal response;Other 1  -EK    Recorded by [EK] MOHSEN Morataya  [EK] MOHSEN Morataya    Improve timing of pharyngeal response    To improve timing of pharyngeal response, patient will: Swallow in timely way after sensory input;90%  -EK  Swallow in timely way after sensory input;90%  -EK    Status: Improve timing of pharyngeal response Progress slower than expected  -EK  Progressing as expected  -EK    Timing of Pharyngeal Response Progress 60%  -EK  80%  -EK    Comments: Improve timing of pharyngeal response Delayed swallow noted; pt required multiple cues to swallow and double swallow to clear residue from oral cavity.   -EK  Pt with delayed swallow; pt required cues to double swallow to clear oral residue.   -EK    Recorded by [EK] MOHSEN Morataya  [EK] MOHSEN Morataya    Dysphagia Other 1    Dysphagia Other 1 Objective pt will tolerate recommended diet with no s/s of aspiration  -EK  pt will tolerate recommended diet with no s/s of aspiration  -EK    Status: Dysphagia Other 1 Progress slower than expected  -EK  Progressing as expected  -EK    Dysphagia Other 1 Progress 60%  -EK  80%  -EK    Comments: Dysphagia Other 1 Pt with s/s of aspiration on various trials of honey thick liquid and puree.   -EK  Pt with mild pocketing of puree and trials of bread. Trials of bread by SLP only due to pocketing.   -EK    Recorded by [EK] MOHSEN Morataya  [EK] MARIN MoratayaSLP    Bed Mobility, Assessment/Treatment    Bed Mobility, Assistive Device  bed rails;head of bed elevated  -CZ     Bed Mobility, Roll Left, [place]  maximum assist (25% patient effort)  -CZ     Bed Mob, Supine to Sit, [place]  maximum assist (25% patient effort)  -CZ     Bed Mobility, Comment  caregiver  and staff performed transfer with SPV from PT.   -CZ     Recorded by  [CZ] Seht Rice, PT     Transfer Assessment/Treatment    Transfers, Bed-Chair [place]  maximum assist (25% patient effort)  -CZ     Transfer, Comment  Transferred toward R to allow patient support on bed rail with stronger LUE.   -CZ     Recorded by  [CZ] Seth Rice, PT     Positioning and Restraints    Pre-Treatment Position in bed  -EK in bed  -CZ in bed  -EK    Post Treatment Position bed  -EK chair  -CZ bed  -EK    In Bed encouraged to call for assist;call light within reach;exit alarm on  -EK supine;with family/caregiver  -CZ call light within reach;exit alarm on;with family/caregiver  -EK    Recorded by [EK] Magaly Deleon, [facility]-SLP [CZ] Seth Rice, PT [EK] Magaly Deleon, [facility]      User Key  (r) = Recorded By, (t) = Taken By, (c) = Cosigned By    Initials Name Effective Dates    EC Magaly Omer, CCC-SLP 12/30/16 -     EK Magaly Deleon, CCC-SLP 04/06/17 -     AM Augie Vanegas, PTA 10/17/16 -     CZ Seth Rice, PT 02/17/17 -                   IP SLP Goals       02/14/18 1318 02/13/18 1431 02/12/18 1430    Safely Consume Diet    Safely Consume Diet- SLP, Date Established 02/14/18  -EC      Safely Consume Diet- SLP, Time to Achieve by discharge  -EC      Safely Consume Diet- SLP, Date Goal Reviewed 02/14/18  -EC 02/13/18  -EK 02/12/18  -EK    Safely Consume Diet- SLP, Outcome goal ongoing  -EC        02/11/18 1237 02/10/18 1354 02/09/18 1009    Safely Consume Diet    Safely Consume Diet- SLP, Date Established   02/09/18  -EC    Safely Consume Diet- SLP, Time to Achieve   by discharge  -EC    Safely Consume Diet- SLP, Activity Level   Patient will improve timing of pharyngeal response for safer, more efficient swallow  -EC    Safely Consume Diet- SLP, Date Goal Reviewed 02/11/18  -EK 02/10/18  -EK 02/09/18  -EC    Safely Consume Diet- SLP, Outcome  goal not met  -EK        User Key  (r) = Recorded By, (t) = Taken By, (c) = Cosigned By    Initials Name Provider Type    MOHSEN Poole Speech and Language Pathologist    MIKALA Deleon CCC-SLP Speech and Language Pathologist          EDUCATION  The patient has been educated in the following areas:   Dysphagia (Swallowing Impairment).    SLP Recommendation and Plan                                           Plan of Care Review  Plan Of Care Reviewed With: patient, caregiver, family  Progress: progress towards functional goals is fair  Outcome Summary/Follow up Plan: St therapy provided this morning.  pt family and caregivers are at bedside and pt is in recliner at side of bed.  pt is more labored in jayy thing and has 4 L nasal cannula. she has discoordination with attempts at swallow of liquids from spoon.  pt family educated for r/o aspiration and precautions.  pt remains on puree diet and Honey thick liquids           Time Calculation:         Time Calculation- SLP       02/14/18 1321          Time Calculation- SLP    SLP Start Time 0940  -EC      SLP Stop Time 1010  -EC      SLP Time Calculation (min) 30 min  -EC      Total Timed Code Minutes- SLP 30 minute(s)  -EC      SLP Received On 02/14/18  -EC        User Key  (r) = Recorded By, (t) = Taken By, (c) = Cosigned By    Initials Name Provider Type    ROBERT Omer CCC-SLP Speech and Language Pathologist          Therapy Charges for Today     Code Description Service Date Service Provider Modifiers Qty    [id number]  ST TREATMENT SWALLOW 2 2/14/2018 MOHSEN Ramirez GN 1          SLP G-Codes  Functional Limitations: Swallowing  Swallow Current Status (): At least 60 percent but less than 80 percent impaired, limited or restricted  Swallow Goal Status (): At least 60 percent but less than 80 percent impaired, limited or restricted  Swallow Discharge Status (): At least 40 percent but less than 60 percent impaired,  limited or restricted      Magaly Omer, [facility]-SLP  2/14/2018

## 2018-02-14 NOTE — SIGNIFICANT NOTE
02/14/18 1415   Rehab Treatment   Discipline occupational therapy assistant   Treatment Not Performed patient/family declined treatment  (Pt's family declined tx this pm. OT will check back tomorrow.)

## 2018-02-15 NOTE — PLAN OF CARE
Problem: Patient Care Overview (Adult)  Goal: Plan of Care Review  Outcome: Ongoing (interventions implemented as appropriate)   02/14/18 5559   Coping/Psychosocial Response Interventions   Plan Of Care Reviewed With patient;family   Patient Care Overview   Progress declining   Outcome Evaluation   Outcome Summary/Follow up Plan Pt made comfort measure at this time; pt resting; family at bedside     Goal: Adult Individualization and Mutuality  Outcome: Ongoing (interventions implemented as appropriate)    Goal: Discharge Needs Assessment  Outcome: Ongoing (interventions implemented as appropriate)      Problem: Fall Risk (Adult)  Goal: Identify Related Risk Factors and Signs and Symptoms  Outcome: Ongoing (interventions implemented as appropriate)    Goal: Absence of Falls  Outcome: Ongoing (interventions implemented as appropriate)      Problem: Orthopaedic Fracture (Adult)  Goal: Signs and Symptoms of Listed Potential Problems Will be Absent or Manageable (Orthopaedic Fracture)  Outcome: Ongoing (interventions implemented as appropriate)      Problem: Older Inpatient, Acutely Ill (Adult)  Goal: Signs and Symptoms of Listed Potential Problems Will be Absent or Manageable (Older Inpatient, Acutely Ill)  Outcome: Ongoing (interventions implemented as appropriate)      Problem: Pain, Acute (Adult)  Goal: Identify Related Risk Factors and Signs and Symptoms  Outcome: Ongoing (interventions implemented as appropriate)    Goal: Acceptable Pain Control/Comfort Level  Outcome: Ongoing (interventions implemented as appropriate)

## 2018-02-15 NOTE — THERAPY DISCHARGE NOTE
Acute Care - Speech Language Pathology Discharge Summary  [facility]       Patient Name: Isabel Barry  : 1924  MRN: [id number]    Today's Date: 2/15/2018  Onset of Illness/Injury or Date of Surgery Date: 18                Admit Date: 2018  Pt d/c from skilled st for comfort measures.  Recommend continue puree solids with honey thick liquids per pt request for comfort.  Please reconsult if/when necessary.  Magaly Omer CCC-SLP 2/15/2018 11:07 AM      SLP Recommendation and Plan    Visit Dx:    ICD-10-CM ICD-9-CM   1. Closed subcapital fracture of right femur, initial encounter S72.011A 820.09   2. Hip fracture, right, closed, initial encounter S72.001A 820.8   3. Oropharyngeal dysphagia R13.12 787.22   4. Impaired physical mobility Z74.09 781.99   5. Impaired mobility and activities of daily living Z74.09 799.89                     IP SLP Goals       18 1318 18 1431 18 1430    Safely Consume Diet    Safely Consume Diet- SLP, Date Established 18  -EC      Safely Consume Diet- SLP, Time to Achieve by discharge  -EC      Safely Consume Diet- SLP, Date Goal Reviewed 18  -EC 18  -EK 18  -EK    Safely Consume Diet- SLP, Outcome goal ongoing  -EC        18 1237 02/10/18 1354 18 1009    Safely Consume Diet    Safely Consume Diet- SLP, Date Established   18  -EC    Safely Consume Diet- SLP, Time to Achieve   by discharge  -EC    Safely Consume Diet- SLP, Activity Level   Patient will improve timing of pharyngeal response for safer, more efficient swallow  -EC    Safely Consume Diet- SLP, Date Goal Reviewed 18  -EK 02/10/18  -EK 18  -EC    Safely Consume Diet- SLP, Outcome  goal not met  -EK       User Key  (r) = Recorded By, (t) = Taken By, (c) = Cosigned By    Initials Name Provider Type    EC Magaly Omer CCC-SLP Speech and Language Pathologist    EK Magaly Deleon CCC-SLP Speech and Language  Pathologist            Therapy Charges for Today     Code Description Service Date Service Provider Modifiers Qty    [id number] HC ST TREATMENT SWALLOW 2 2/14/2018 Magaly Omer CCC-SLP GN 1            SLP Discharge Summary  Anticipated Discharge Disposition: home with 24/7 care  Reason for Discharge: Change in medical status  Discharge Destination: other (comment)      Magaly Omer CCC-SLP  2/15/2018

## 2018-02-15 NOTE — PROGRESS NOTES
ORTHOPEDIC PROGRESS NOTE:    Name:  Isabel Barry  Date:    2/15/2018  Date of admission:  2/8/2018    Post op day:  6 Days Post-Op  Procedure:    Procedure(s) (LRB):  HIP PERCUTANEOUS PINNING (Right)    Subjective: Events of the last 24 hours noted          Vitals:    Vitals:    02/15/18 0611   BP: 117/56   Pulse: 88   Resp: 24   Temp: 99.1 °F (37.3 °C)   SpO2: (!) 87%       Exam: Patient is somnolent and appears to be resting comfortably  ASSESSMENT:  Hospital Problem List     Hip fracture, right, closed, initial encounter            PLAN: Spoke with the family at length, will follow expectantly  Truong Salmon MD  02/15/18  11:51 AM

## 2018-02-15 NOTE — THERAPY DISCHARGE NOTE
Acute Care - Physical Therapy Discharge Summary  [facility]       Patient Name: Isabel Barry  : 1924  MRN: [id number]    Today's Date: 2/15/2018  Onset of Illness/Injury or Date of Surgery Date: 18    Date of Referral to PT: 18  Referring Physician: Shamar DE JESUS      Admit Date: 2018      PT Recommendation and Plan    Visit Dx:    ICD-10-CM ICD-9-CM   1. Closed subcapital fracture of right femur, initial encounter S72.011A 820.09   2. Hip fracture, right, closed, initial encounter S72.001A 820.8   3. Oropharyngeal dysphagia R13.12 787.22   4. Impaired physical mobility Z74.09 781.99   5. Impaired mobility and activities of daily living Z74.09 799.89             Outcome Measures       18 1521 18 1400 18 0935    How much help from another person do you currently need...    Turning from your back to your side while in flat bed without using bedrails?  2  -AM 2  -AM    Moving from lying on back to sitting on the side of a flat bed without bedrails?  2  -AM 2  -AM    Moving to and from a bed to a chair (including a wheelchair)?  2  -AM 2  -AM    Standing up from a chair using your arms (e.g., wheelchair, bedside chair)?  2  -AM 2  -AM    Climbing 3-5 steps with a railing?  1  -AM 1  -AM    To walk in hospital room?  1  -AM 1  -AM    AM-PAC 6 Clicks Score  10  -AM 10  -AM    How much help from another is currently needed...    Putting on and taking off regular lower body clothing? 1  -RB      Bathing (including washing, rinsing, and drying) 1  -RB      Toileting (which includes using toilet bed pan or urinal) 1  -RB      Putting on and taking off regular upper body clothing 1  -RB      Taking care of personal grooming (such as brushing teeth) 2  -RB      Eating meals 2  -RB      Score 8  -RB      Functional Assessment    Outcome Measure Options AM-PAC 6 Clicks Daily Activity (OT)  -RB AM-PAC 6 Clicks Basic Mobility (PT)  -AM AM-PAC 6 Clicks Basic Mobility (PT)  -AM       User Key  (r) = Recorded By, (t) = Taken By, (c) = Cosigned By    Initials Name Provider Type    RB Joselo Connell OT Occupational Therapist    JASON Vanegas, PTA Physical Therapy Assistant                      IP PT Goals       02/12/18 1520 02/12/18 0902       Bed Mobility PT STG    Bed Mobility PT STG, Date Established  02/12/18  -CZ     Bed Mobility PT STG, Time to Achieve  2 - 3 days  -CZ     Bed Mobility PT STG, Activity Type  all bed mobility  -CZ     Bed Mobility PT STG, Carter Level  minimum assist (75% patient effort)  -CZ     Transfer Training Goal, Assist Device  bed rails  -CZ     Bed Mobility PT STG, Date Goal Reviewed 02/12/18  -CZ 02/12/18  -CZ     Bed Mobility PT STG, Outcome goal ongoing  -CZ goal ongoing  -CZ     Transfer Training PT STG    Transfer Training PT STG, Date Established  02/12/18  -CZ     Transfer Training PT STG, Time to Achieve  2 days  -CZ     Transfer Training PT STG, Activity Type  sit to stand/stand to sit  -CZ     Transfer Training PT STG, Carter Level  minimum assist (75% patient effort)  -CZ     Transfer Training PT STG, Additional Goal  maintaing TDWB on R, support from bed rail.   -CZ     Transfer Training PT STG, Date Goal Reviewed 02/12/18  -CZ 02/12/18  -CZ     Transfer Training PT STG, Outcome goal ongoing  -CZ goal ongoing  -CZ     Transfer Training PT LTG    Transfer Training PT LTG, Date Established  02/12/18  -CZ     Transfer Training PT LTG, Activity Type  bed to chair /chair to bed  -CZ     Transfer Training PT LTG, Carter Level  minimum assist (75% patient effort)  -CZ     Transfer Training PT LTG, Additional Goal  Patient's caregivers and family will demonstrate proper technique to assist patient.   -CZ     Transfer Training PT  LTG, Date Goal Reviewed 02/12/18  -CZ 02/12/18  -CZ     Transfer Training PT LTG, Outcome goal partially met  -CZ goal ongoing  -CZ       User Key  (r) = Recorded By, (t) = Taken By, (c) = Cosigned By     Initials Name Provider Type    CZ Seth Rice, PT Physical Therapist              PT Discharge Summary  Anticipated Discharge Disposition: skilled nursing facility  Reason for Discharge: Change in medical status  Outcomes Achieved: Unable to make functional progress toward goals at this time  Discharge Destination: other (comment) (Comfort Measure)      Augie Vanegas, PTA   2/15/2018

## 2018-02-15 NOTE — PROGRESS NOTES
[facility] Medicine Services  INPATIENT PROGRESS NOTE    Length of Stay: 7  Date of Admission: 2/8/2018  Primary Care Physician: Josseline Lentz MD    Subjective   Chief Complaint: No complaints    HPI:  This is a 89-year-old lady that was admitted to/8/2018 after a mechanical fall.  Patient was found to have a right hip fracture and underwent surgery by Dr. Salmon.  The patient had worsening oxygenation due to aspiration pneumonia.  Patient was treated for A. fib with RVR but her condition declined and the family decided to make the patient comfort measures due to the multiple comorbidities.  At this time the patient has a morphine PCA with scheduled Ativan.    Review of Systems   Unable to perform ROS: Patient nonverbal        All pertinent negatives and positives are as above. All other systems have been reviewed and are negative unless otherwise stated.     Objective    Temp:  [99.1 °F (37.3 °C)] 99.1 °F (37.3 °C)  Heart Rate:  [] 88  Resp:  [24-38] 24  BP: (117-171)/() 117/56    Physical Exam   Constitutional: She appears well-developed and well-nourished.   HENT:   Head: Normocephalic and atraumatic.   Eyes: EOM are normal. Pupils are equal, round, and reactive to light.   Neck: Normal range of motion. Neck supple.   Pulmonary/Chest: Apnea noted. She is in respiratory distress.   Abdominal: Soft. Bowel sounds are normal.   Musculoskeletal: Normal range of motion.   Skin: Skin is warm and dry.     Results Review:  I have reviewed the labs, radiology results, and diagnostic studies.    Laboratory Data:     Results from last 7 days  Lab Units 02/14/18  1428 02/14/18  0728 02/12/18  0611 02/11/18  0538  02/08/18 2020   SODIUM mmol/L  --  143 141 140  < > 133*   SODIUM, ARTERIAL mmol/L 138.8  --   --   --   --   --    POTASSIUM mmol/L  --  3.9 3.6 3.7  < > 4.1   CHLORIDE mmol/L  --  109 110 109  < > 103   CO2 mmol/L  --  21.0* 24.0 23.0  < > 23.0   BUN  mg/dL  --  13 12 13  < > 14   CREATININE mg/dL  --  0.64 0.76 0.81  < > 0.84   GLUCOSE mg/dL  --  87 78 76  < > 87   GLUCOSE, ARTERIAL mmol/L 217  --   --   --   --   --    CALCIUM mg/dL  --  8.5 8.1* 8.3*  < > 9.1   BILIRUBIN mg/dL  --  0.2 0.2  --   --  0.3   ALK PHOS U/L  --  95 76  --   --  100   ALT (SGPT) U/L  --  28 24  --   --  36   AST (SGOT) U/L  --  35 23  --   --  27   ANION GAP mmol/L  --  13.0 7.0 8.0  < > 7.0   < > = values in this interval not displayed.  Estimated Creatinine Clearance: 42.1 mL/min (by C-G formula based on Cr of 0.64).            Results from last 7 days  Lab Units 02/14/18  0728 02/12/18  0611 02/11/18  0538 02/09/18  0536 02/08/18  2020   WBC 10*3/mm3 8.16 7.93 7.39 12.23* 15.72*   HEMOGLOBIN g/dL 9.3* 8.4* 8.9* 9.3* 11.0*   HEMATOCRIT % 28.6* 25.6* 27.5* 27.8* 33.2*   PLATELETS 10*3/mm3 218 174 156 189 236       Results from last 7 days  Lab Units 02/08/18  2126   INR  0.98       Culture Data:   Blood Culture   Date Value Ref Range Status   02/14/2018 No growth at less than 24 hours  Preliminary   02/14/2018 No growth at less than 24 hours  Preliminary     No results found for: URINECX  No results found for: RESPCX  No results found for: WOUNDCX  No results found for: STOOLCX  No components found for: BODYFLD    Radiology Data:   Imaging Results (last 24 hours)     Procedure Component Value Units Date/Time    US Guided Vascular Access [[id number]] Resulted:  02/14/18 1516     Updated:  02/14/18 1516    Narrative:       This procedure was auto-finalized with no dictation required.    IR Insert Midline Without Port Pump 5 Plus [[id number]] Resulted:  02/14/18 1526     Updated:  02/14/18 1526    Narrative:       This procedure was auto-finalized with no dictation required.          I have reviewed the patient current medications.     Assessment/Plan     Active Hospital Problems (** Indicates Principal Problem)    Diagnosis Date Noted   • Hip fracture, right, closed, initial encounter  [S72.001A] 02/08/2018      Resolved Hospital Problems    Diagnosis Date Noted Date Resolved   No resolved problems to display.         Plan:    1.  Acute hypoxic respiratory failure/ aspiration pneumonia/ status post day 6 right hip pinning:  Aggressive treatment has been discontinued as the family has opted comfort measures due to the multiple comorbidities.  We'll continue PCA morphine and scheduled Ativan.       This document has been electronically signed by CHAPO Dee on February 15, 2018 11:47 AM

## 2018-02-15 NOTE — SIGNIFICANT NOTE
At this time patient had an acute change and was having a labored breathing pattern and audibly wheezing. Her o2 was stable but HR and other VS were not stable. Shamar DE JESUS was paged STAT and respiratory was also paged STAT. They entered room 10 minutes later where orders were place (see chart.) At bedside was myself, Anita Morel charge nurse, respiratory, and unit director Alyson Meyer.

## 2018-02-15 NOTE — PLAN OF CARE
Problem: Patient Care Overview (Adult)  Goal: Plan of Care Review  Outcome: Ongoing (interventions implemented as appropriate)   02/14/18 1404   Coping/Psychosocial Response Interventions   Plan Of Care Reviewed With patient   Patient Care Overview   Progress declining   Outcome Evaluation   Outcome Summary/Follow up Plan pt need transfer to step down     Goal: Adult Individualization and Mutuality  Outcome: Ongoing (interventions implemented as appropriate)    Goal: Discharge Needs Assessment  Outcome: Ongoing (interventions implemented as appropriate)      Problem: Fall Risk (Adult)  Goal: Identify Related Risk Factors and Signs and Symptoms  Outcome: Ongoing (interventions implemented as appropriate)    Goal: Absence of Falls  Outcome: Ongoing (interventions implemented as appropriate)      Problem: Orthopaedic Fracture (Adult)  Goal: Signs and Symptoms of Listed Potential Problems Will be Absent or Manageable (Orthopaedic Fracture)  Outcome: Ongoing (interventions implemented as appropriate)      Problem: Perioperative Period (Adult)  Goal: Signs and Symptoms of Listed Potential Problems Will be Absent or Manageable (Perioperative Period)  Outcome: Ongoing (interventions implemented as appropriate)      Problem: Older Inpatient, Acutely Ill (Adult)  Goal: Signs and Symptoms of Listed Potential Problems Will be Absent or Manageable (Older Inpatient, Acutely Ill)  Outcome: Ongoing (interventions implemented as appropriate)      Problem: Pain, Acute (Adult)  Goal: Identify Related Risk Factors and Signs and Symptoms  Outcome: Ongoing (interventions implemented as appropriate)    Goal: Acceptable Pain Control/Comfort Level  Outcome: Ongoing (interventions implemented as appropriate)

## 2018-02-15 NOTE — PLAN OF CARE
Problem: Patient Care Overview (Adult)  Goal: Plan of Care Review  Outcome: Ongoing (interventions implemented as appropriate)   02/15/18 0452   Coping/Psychosocial Response Interventions   Plan Of Care Reviewed With patient   Patient Care Overview   Progress declining   Outcome Evaluation   Outcome Summary/Follow up Plan Morphine PCA initiated, ativan q4 scheduled, family at bedside     Goal: Adult Individualization and Mutuality  Outcome: Ongoing (interventions implemented as appropriate)    Goal: Discharge Needs Assessment  Outcome: Ongoing (interventions implemented as appropriate)      Problem: Fall Risk (Adult)  Goal: Identify Related Risk Factors and Signs and Symptoms  Outcome: Outcome(s) achieved Date Met: 02/15/18    Goal: Absence of Falls  Outcome: Ongoing (interventions implemented as appropriate)      Problem: Orthopaedic Fracture (Adult)  Goal: Signs and Symptoms of Listed Potential Problems Will be Absent or Manageable (Orthopaedic Fracture)  Outcome: Ongoing (interventions implemented as appropriate)      Problem: Perioperative Period (Adult)  Goal: Signs and Symptoms of Listed Potential Problems Will be Absent or Manageable (Perioperative Period)  Outcome: Outcome(s) achieved Date Met: 02/15/18      Problem: Older Inpatient, Acutely Ill (Adult)  Goal: Signs and Symptoms of Listed Potential Problems Will be Absent or Manageable (Older Inpatient, Acutely Ill)  Outcome: Ongoing (interventions implemented as appropriate)      Problem: Pain, Acute (Adult)  Goal: Identify Related Risk Factors and Signs and Symptoms  Outcome: Outcome(s) achieved Date Met: 02/15/18    Goal: Acceptable Pain Control/Comfort Level  Outcome: Ongoing (interventions implemented as appropriate)

## 2018-02-15 NOTE — PLAN OF CARE
Problem: Patient Care Overview (Adult)  Goal: Plan of Care Review  Outcome: Ongoing (interventions implemented as appropriate)   02/15/18 1343   Coping/Psychosocial Response Interventions   Plan Of Care Reviewed With patient   Outcome Evaluation   Outcome Summary/Follow up Plan morphine PCA continued, ativan given, appears comfortable      Goal: Adult Individualization and Mutuality  Outcome: Ongoing (interventions implemented as appropriate)    Goal: Discharge Needs Assessment  Outcome: Ongoing (interventions implemented as appropriate)      Problem: Fall Risk (Adult)  Goal: Absence of Falls  Outcome: Ongoing (interventions implemented as appropriate)      Problem: Orthopaedic Fracture (Adult)  Goal: Signs and Symptoms of Listed Potential Problems Will be Absent or Manageable (Orthopaedic Fracture)  Outcome: Ongoing (interventions implemented as appropriate)      Problem: Older Inpatient, Acutely Ill (Adult)  Goal: Signs and Symptoms of Listed Potential Problems Will be Absent or Manageable (Older Inpatient, Acutely Ill)  Outcome: Ongoing (interventions implemented as appropriate)      Problem: Pain, Acute (Adult)  Goal: Acceptable Pain Control/Comfort Level  Outcome: Ongoing (interventions implemented as appropriate)

## 2018-02-16 PROBLEM — I48.91 ATRIAL FIBRILLATION WITH RVR (HCC): Status: ACTIVE | Noted: 2018-01-01

## 2018-02-16 PROBLEM — J69.0 ASPIRATION PNEUMONIA (HCC): Status: ACTIVE | Noted: 2018-01-01

## 2018-02-16 PROBLEM — J95.821 RESPIRATORY FAILURE, POST-OPERATIVE (HCC): Status: ACTIVE | Noted: 2018-01-01

## 2018-02-16 NOTE — PROGRESS NOTES
ORTHOPEDIC PROGRESS NOTE:    Name:  Isabel Barry  Date:    2/16/2018  Date of admission:  2/8/2018    Post op day:  7 Days Post-Op  Procedure:    Procedure(s) (LRB):  HIP PERCUTANEOUS PINNING (Right)    Subjective: Nothing new  Vitals:    Vitals:    02/16/18 0915   BP: (!) 76/30   Pulse: 114   Resp: 17   Temp:    SpO2: (!) 78%       Exam:sleeping with family at bedside      ASSESSMENT:  Hospital Problem List     Hip fracture, right, closed, initial encounter            PLAN: Nothing to add    Truong Salmon MD  02/16/18  12:16 PM

## 2018-02-16 NOTE — CONSULTS
Nutrition Services    Patient Name:  Isabel Barry  YOB: 1924  MRN: [id number]  Admit Date:  2/8/2018    Pt has had a decline in her overall condition and family has opted to make her comfort care.  Visited family and offered emotional support.      Electronically signed by:  Bernadette Jaramillo RD  02/16/18 2:19 PM

## 2018-02-16 NOTE — PLAN OF CARE
Problem: Inpatient Occupational Therapy  Goal: Transfer Training Goal 1 LTG- OT  Outcome: Unable to achieve outcome(s) by discharge Date Met: 18 16518 1506   Transfer Training OT LTG   Transfer Training OT LTG, Date Established 18 --    Transfer Training OT LTG, Time to Achieve by discharge --    Transfer Training OT LTG, Activity Type bed to chair /chair to bed --    Transfer Training OT LTG, Shannon Level moderate assist (50% patient effort);other (see comments)  (of 1-2 with gait belt.) --    Transfer Training OT LTG, Date Goal Reviewed --  18   Transfer Training OT LTG, Outcome --  goal not met   Transfer Training OT LTG, Reason Goal Not Met --  (pt )     Goal: Strength Goal LTG- OT  Outcome: Unable to achieve outcome(s) by discharge Date Met: 18 1506   Strength OT LTG   Strength Goal OT LTG, Date Established 18 --    Strength Goal OT LTG, Time to Achieve by discharge --    Strength Goal OT LTG, Measure to Achieve 1-2 sets of 10 reps all planes with 1/2 to 1 lb wrist wt or dumbell for L UE strengthening to increase independence with bed mobility and transfers --    Strength Goal OT LTG, Date Goal Reviewed --  18   Strength Goal OT LTG, Outcome --  goal not met   Strength Goal OT LTG, Reason Goal Not Met --  (pt )     Goal: Dynamic Sitting Balance Goal LTG- OT  Outcome: Unable to achieve outcome(s) by discharge Date Met: 18 16518 1506   Dynamic Sitting Balance OT LTG   Dynamic Sitting Balance OT LTG, Date Established 18 --    Dynamic Sitting Balance OT LTG, Time to Achieve by discharge --    Dynamic Sitting Balance OT LTG, Shannon Level contact guard assist;supervision required  (5 - 10 minutes with functional activity.) --    Dynamic Sitting Balance OT LTG, Assist Device bed rails --    Dynamic Sitting Balance OT LTG, Date Goal Reviewed --  18   Dynamic Sitting Balance OT  LTG, Outcome --  goal not met   Dynamic Sitting Balance OT LTG, Reason Goal Not Met --  (pt )     Goal: Caregiver Training Goal LTG- OT  Outcome: Unable to achieve outcome(s) by discharge Date Met: 18 1659 18 1506   Caregiver Training OT LTG   Caregiver Training OT LTG, Date Established 18 --    Caregiver Training OT LTG, Time to Achieve by discharge --    Caregiver Training OT LTG, Activity Type Transfer with gait belt and touch down weight bearing on R LE. --    Caregiver Training OT LTG, Sterling Level able to assist adequately --    Caregiver Training OT LTG, Date Goal Reviewed --  18   Caregiver Training OT LTG, Outcome --  goal not met   Caregiver Training OT LTG, Reason Goal Not Met --  (pt )

## 2018-02-16 NOTE — DISCHARGE SUMMARY
[facility] Medicine Services  DEATH SUMMARY       Date of Admission: 2018  Date of Death:  2018 at approximately 1216  Primary Care Physician: Josseline Lentz MD    Presenting Problem/History of Present Illness:  Closed subcapital fracture of right femur, initial encounter [S72.011A]  Hip fracture, right, closed, initial encounter [S72.001A]     Final Death Diagnoses:  Hospital Problem List     Hip fracture, right, closed, initial encounter    Atrial fibrillation with RVR    Aspiration pneumonia    Respiratory failure, post-operative          Consults:   Consults     Date and Time Order Name Status Description    2018 1813 Hospitalist (on-call MD unless specified)            Procedures Performed: Procedure(s):  HIP PERCUTANEOUS PINNING                Pertinent Test Results:   Lab Results (last 24 hours)     Procedure Component Value Units Date/Time    Blood Culture - Blood, [[id number]]  (Normal) Collected:  18 1520    Specimen:  Blood from Arm, Right Updated:  02/15/18 1546     Blood Culture No growth at 24 hours    Blood Culture - Blood, [[id number]]  (Normal) Collected:  18 1530    Specimen:  Blood from Arm, Left Updated:  02/15/18 1546     Blood Culture No growth at 24 hours        Imaging Results (last 24 hours)     ** No results found for the last 24 hours. **            Hospital Course:  This is a 89-year-old lady that was admitted to 2018 after a mechanical fall.  Patient was found to have a right hip fracture and underwent surgery by Dr. Salmon.  The patient had worsening oxygenation due to aspiration pneumonia.  Patient was treated for A. fib with RVR but her condition declined and the family decided to make the patient comfort measures due to the multiple comorbidities. The patient was made comfortable with PCA morphine and scheduled Ativan.  The patient  on 2018 at 1216.            This document has been electronically  signed by CHAPO Dee on February 16, 2018 12:53 PM        Time: 1216

## 2018-02-16 NOTE — PROGRESS NOTES
[facility] Medicine Services  INPATIENT PROGRESS NOTE    Length of Stay: 8  Date of Admission: 2/8/2018  Primary Care Physician: Josseline Lentz MD    Subjective   Chief Complaint: No complaints    HPI:      2/16/2018: The patient is resting comfortably in bed with no signs of distress.  Family is at bedside.     This is a 89-year-old lady that was admitted to/8/2018 after a mechanical fall.  Patient was found to have a right hip fracture and underwent surgery by Dr. Salmon.  The patient had worsening oxygenation due to aspiration pneumonia.  Patient was treated for A. fib with RVR but her condition declined and the family decided to make the patient comfort measures due to the multiple comorbidities.  At this time the patient has a morphine PCA with scheduled Ativan.    Review of Systems   Unable to perform ROS: Patient nonverbal        All pertinent negatives and positives are as above. All other systems have been reviewed and are negative unless otherwise stated.     Objective    Temp:  [97.1 °F (36.2 °C)-99.4 °F (37.4 °C)] 98.2 °F (36.8 °C)  Heart Rate:  [] 114  Resp:  [12-20] 17  BP: ()/(27-53) 76/30    Physical Exam   Constitutional: She appears well-developed and well-nourished.   HENT:   Head: Normocephalic and atraumatic.   Eyes: EOM are normal. Pupils are equal, round, and reactive to light.   Neck: Normal range of motion. Neck supple.   Pulmonary/Chest: Apnea noted. She is in respiratory distress.   Abdominal: Soft. Bowel sounds are normal.   Musculoskeletal: Normal range of motion.   Skin: Skin is warm and dry.     Results Review:  I have reviewed the labs, radiology results, and diagnostic studies.    Laboratory Data:     Results from last 7 days  Lab Units 02/14/18  1428 02/14/18  0728 02/12/18  0611 02/11/18  0538   SODIUM mmol/L  --  143 141 140   SODIUM, ARTERIAL mmol/L 138.8  --   --   --    POTASSIUM mmol/L  --  3.9 3.6 3.7   CHLORIDE  mmol/L  --  109 110 109   CO2 mmol/L  --  21.0* 24.0 23.0   BUN mg/dL  --  13 12 13   CREATININE mg/dL  --  0.64 0.76 0.81   GLUCOSE mg/dL  --  87 78 76   GLUCOSE, ARTERIAL mmol/L 217  --   --   --    CALCIUM mg/dL  --  8.5 8.1* 8.3*   BILIRUBIN mg/dL  --  0.2 0.2  --    ALK PHOS U/L  --  95 76  --    ALT (SGPT) U/L  --  28 24  --    AST (SGOT) U/L  --  35 23  --    ANION GAP mmol/L  --  13.0 7.0 8.0     Estimated Creatinine Clearance: 42.1 mL/min (by C-G formula based on Cr of 0.64).            Results from last 7 days  Lab Units 02/14/18  0728 02/12/18  0611 02/11/18  0538   WBC 10*3/mm3 8.16 7.93 7.39   HEMOGLOBIN g/dL 9.3* 8.4* 8.9*   HEMATOCRIT % 28.6* 25.6* 27.5*   PLATELETS 10*3/mm3 218 174 156           Culture Data:   Blood Culture   Date Value Ref Range Status   02/14/2018 No growth at 24 hours  Preliminary   02/14/2018 No growth at 24 hours  Preliminary     No results found for: URINECX  No results found for: RESPCX  No results found for: WOUNDCX  No results found for: STOOLCX  No components found for: BODYFLD    Radiology Data:   Imaging Results (last 24 hours)     ** No results found for the last 24 hours. **          I have reviewed the patient current medications.     Assessment/Plan     Active Hospital Problems (** Indicates Principal Problem)    Diagnosis Date Noted   • Hip fracture, right, closed, initial encounter [S72.001A] 02/08/2018      Resolved Hospital Problems    Diagnosis Date Noted Date Resolved   No resolved problems to display.         Plan:    1.  Acute hypoxic respiratory failure/ aspiration pneumonia/ status post day 7 right hip pinning:  Aggressive treatment has been discontinued as the family has opted comfort measures due to the multiple comorbidities.  We'll continue PCA morphine and scheduled Ativan.       This document has been electronically signed by CHAPO Dee on February 16, 2018 9:58 AM

## 2018-02-16 NOTE — THERAPY DISCHARGE NOTE
Acute Care - Occupational Therapy Discharge Summary  [facility]     Patient Name: Isabel Barry  : 1924  MRN: [id number]    Today's Date: 2018  Onset of Illness/Injury or Date of Surgery Date: 18    Date of Referral to OT: 18  Referring Physician: Shamar DE JESUS      Admit Date: 2018        OT Recommendation and Plan    Visit Dx:    ICD-10-CM ICD-9-CM   1. Closed subcapital fracture of right femur, initial encounter S72.011A 820.09   2. Hip fracture, right, closed, initial encounter S72.001A 820.8   3. Oropharyngeal dysphagia R13.12 787.22   4. Impaired physical mobility Z74.09 781.99   5. Impaired mobility and activities of daily living Z74.09 799.89                     OT Goals       18 1506 18 1659       Transfer Training OT LTG    Transfer Training OT LTG, Date Established  18  -RB     Transfer Training OT LTG, Time to Achieve  by discharge  -RB     Transfer Training OT LTG, Activity Type  bed to chair /chair to bed  -RB     Transfer Training OT LTG, [place] Level  moderate assist (50% patient effort);other (see comments)   of 1-2 with gait belt.  -RB     Transfer Training OT LTG, Date Goal Reviewed 18  -      Transfer Training OT LTG, Outcome goal not met  -      Transfer Training OT LTG, Reason Goal Not Met --   pt   -      Strength OT LTG    Strength Goal OT LTG, Date Established  18  -RB     Strength Goal OT LTG, Time to Achieve  by discharge  -RB     Strength Goal OT LTG, Measure to Achieve  1-2 sets of 10 reps all planes with 1/2  to 1 lb wrist wt or dumbell for L UE strengthening to increase independence with bed mobility and transfers  -RB     Strength Goal OT LTG, Date Goal Reviewed 18  -      Strength Goal OT LTG, Outcome goal not met  -      Strength Goal OT LTG, Reason Goal Not Met --   pt   -      Dynamic Sitting Balance OT LTG    Dynamic Sitting Balance OT LTG, Date Established  18   -RB     Dynamic Sitting Balance OT LTG, Time to Achieve  by discharge  -RB     Dynamic Sitting Balance OT LTG, [place] Level  contact guard assist;supervision required   5 - 10 minutes with functional activity.  -RB     Dynamic Sitting Balance OT LTG, Assist Device  bed rails  -RB     Dynamic Sitting Balance OT LTG, Date Goal Reviewed 18  -      Dynamic Sitting Balance OT LTG, Outcome goal not met  -      Dynamic Sitting Balance OT LTG, Reason Goal Not Met --   pt   -      Caregiver Training OT LTG    Caregiver Training OT LTG, Date Established  18  -RB     Caregiver Training OT LTG, Time to Achieve  by discharge  -RB     Caregiver Training OT LTG, Activity Type  Transfer with gait belt and touch down weight bearing on R LE.  -RB     Caregiver Training OT LTG, [place] Level  able to assist adequately  -RB     Caregiver Training OT LTG, Date Goal Reviewed 18  -      Caregiver Training OT LTG, Outcome goal not met  -      Caregiver Training OT LTG, Reason Goal Not Met --   pt   -        User Key  (r) = Recorded By, (t) = Taken By, (c) = Cosigned By    Initials Name Provider Type    NO Connell, VONDA Occupational Therapist     Fatuma Sanchez OTR/L Occupational Therapist                Outcome Measures       18 1521          How much help from another is currently needed...    Putting on and taking off regular lower body clothing? 1  -RB      Bathing (including washing, rinsing, and drying) 1  -RB      Toileting (which includes using toilet bed pan or urinal) 1  -RB      Putting on and taking off regular upper body clothing 1  -RB      Taking care of personal grooming (such as brushing teeth) 2  -RB      Eating meals 2  -RB      Score 8  -RB      Functional Assessment    Outcome Measure Options AM-PAC 6 Clicks Daily Activity (OT)  -RB        User Key  (r) = Recorded By, (t) = Taken By, (c) = Cosigned By    Initials Name Provider Type    NO Connell,  OT Occupational Therapist              OT Discharge Summary  Anticipated Discharge Disposition: home with  care, skilled nursing facility  Reason for Discharge: Patient   Outcomes Achieved: Refer to plan of care for updates on goals achieved  Discharge Destination: other (comment) (pt )      HEIDI Rowland/MAGNOLIA  2018

## 2018-02-16 NOTE — PLAN OF CARE
Problem: Patient Care Overview (Adult)  Goal: Plan of Care Review  Outcome: Ongoing (interventions implemented as appropriate)   02/16/18 0548   Coping/Psychosocial Response Interventions   Plan Of Care Reviewed With family   Patient Care Overview   Progress declining   Outcome Evaluation   Outcome Summary/Follow up Plan PCA jet changed, suction at bedside for oral secretions, family at bedside     Goal: Adult Individualization and Mutuality  Outcome: Ongoing (interventions implemented as appropriate)    Goal: Discharge Needs Assessment  Outcome: Ongoing (interventions implemented as appropriate)      Problem: Fall Risk (Adult)  Goal: Absence of Falls  Outcome: Ongoing (interventions implemented as appropriate)      Problem: Orthopaedic Fracture (Adult)  Goal: Signs and Symptoms of Listed Potential Problems Will be Absent or Manageable (Orthopaedic Fracture)  Outcome: Ongoing (interventions implemented as appropriate)      Problem: Pain, Acute (Adult)  Goal: Acceptable Pain Control/Comfort Level  Outcome: Ongoing (interventions implemented as appropriate)

## 2018-02-19 LAB
BACTERIA SPEC AEROBE CULT: NORMAL
BACTERIA SPEC AEROBE CULT: NORMAL

## 2021-07-02 NOTE — SIGNIFICANT NOTE
Body Location Override (Optional - Billing Will Still Be Based On Selected Body Map Location If Applicable): mid lower nasal tip Patient was stable, then suddenly became more short of breath.  EKG ordered showing atrial fibrillation with rapid ventricular response.  Patient blood gas demonstrated no hypoxia, no hypercapnia, and a normal pH.  Shortness of air is likely secondary to atrial fibrillation with RVR.  Discussed with Dr. Otero of cardiology who states that medical literature shows that amiodarone can be used in the setting of iodine allergy.  No Cardizem at this time as we do not know and the family does not know the allergy to nifedipine.  Patient is a DO NOT RESUSCITATE and DO NOT INTUBATE.  At this time will transfer to stepdown for closer monitoring and any titration as appropriate.  Patient being closely comanage with attending, Dr. Giordano.  Dr. Mathews, the on-call cardiologist, has been notified and is agreeable to see the patient.  Stat echocardiogram also ordered.        This document has been electronically signed by NEAL Beauchamp on February 14, 2018 2:51 PM

## 2025-07-30 NOTE — PLAN OF CARE
Okay to give sample of trelegy?   Problem: Patient Care Overview (Adult)  Goal: Plan of Care Review  Outcome: Ongoing (interventions implemented as appropriate)   02/12/18 1537   Coping/Psychosocial Response Interventions   Plan Of Care Reviewed With patient   Patient Care Overview   Progress improving   Outcome Evaluation   Outcome Summary/Follow up Plan VS stable-afebrile; patient sat up in chair-worked with PT; enc IS; TURN     Goal: Adult Individualization and Mutuality  Outcome: Ongoing (interventions implemented as appropriate)    Goal: Discharge Needs Assessment  Outcome: Ongoing (interventions implemented as appropriate)      Problem: Fall Risk (Adult)  Goal: Identify Related Risk Factors and Signs and Symptoms  Outcome: Ongoing (interventions implemented as appropriate)    Goal: Absence of Falls  Outcome: Ongoing (interventions implemented as appropriate)      Problem: Orthopaedic Fracture (Adult)  Goal: Signs and Symptoms of Listed Potential Problems Will be Absent or Manageable (Orthopaedic Fracture)  Outcome: Ongoing (interventions implemented as appropriate)      Problem: Perioperative Period (Adult)  Goal: Signs and Symptoms of Listed Potential Problems Will be Absent or Manageable (Perioperative Period)  Outcome: Ongoing (interventions implemented as appropriate)      Problem: Older Inpatient, Acutely Ill (Adult)  Goal: Signs and Symptoms of Listed Potential Problems Will be Absent or Manageable (Older Inpatient, Acutely Ill)  Outcome: Ongoing (interventions implemented as appropriate)

## (undated) DEVICE — GW THRD TROC/PT 2.8X300MM

## (undated) DEVICE — GLV SURG SENSICARE GREEN W/ALOE PF LF 6.5 STRL

## (undated) DEVICE — SOL IRR NACL 0.9PCT BT 1000ML

## (undated) DEVICE — STERILE POLYISOPRENE POWDER-FREE SURGICAL GLOVES WITH EMOLLIENT COATING: Brand: PROTEXIS

## (undated) DEVICE — GLV SURG SENSICARE GREEN W/ALOE PF LF 9 STRL

## (undated) DEVICE — APPL CHLORAPREP W/TINT 26ML ORNG

## (undated) DEVICE — PK HIP LF 60

## (undated) DEVICE — GLV SURG TRIUMPH ORTHO W/ALOE PF LTX 9 STRL

## (undated) DEVICE — UNDRPD BREATH 23X36 BG/10

## (undated) DEVICE — GLV SURG ORTHO BIOGEL OPTIFIT PF SZ9

## (undated) DEVICE — SUT ETHLN 3/0 PS1 18IN 1663H

## (undated) DEVICE — GLV SURG TRIUMPH LT PF LTX 6 STRL

## (undated) DEVICE — GOWN,AURORA,NOREINF,RAGLAN,XL,STERILE: Brand: MEDLINE

## (undated) DEVICE — DRSNG GZ CURAD XEROFORM NONADHS 5X9IN STRL

## (undated) DEVICE — TP ELAS ELASTIKON ADHS 4IN 2.5YD

## (undated) DEVICE — SCRW CANN THRD 7.3X16X390MM
Type: IMPLANTABLE DEVICE | Site: HIP | Status: NON-FUNCTIONAL
Removed: 2018-02-09

## (undated) DEVICE — DRSNG WND BORDR/ADHS NONADHR/GZ LF 4X10IN STRL

## (undated) DEVICE — STPLR SKIN VISISTAT WD 35CT

## (undated) DEVICE — SCRW CANN THRD 7.3X16X85MM
Type: IMPLANTABLE DEVICE | Site: HIP | Status: NON-FUNCTIONAL
Removed: 2018-02-09